# Patient Record
Sex: FEMALE | Race: ASIAN | NOT HISPANIC OR LATINO | Employment: OTHER | URBAN - METROPOLITAN AREA
[De-identification: names, ages, dates, MRNs, and addresses within clinical notes are randomized per-mention and may not be internally consistent; named-entity substitution may affect disease eponyms.]

---

## 2017-02-08 ENCOUNTER — OFFICE VISIT (OUTPATIENT)
Dept: MEDICAL GROUP | Facility: CLINIC | Age: 82
End: 2017-02-08
Payer: MEDICARE

## 2017-02-08 VITALS
SYSTOLIC BLOOD PRESSURE: 155 MMHG | BODY MASS INDEX: 32.51 KG/M2 | WEIGHT: 165.6 LBS | HEART RATE: 72 BPM | DIASTOLIC BLOOD PRESSURE: 80 MMHG | TEMPERATURE: 99.1 F | OXYGEN SATURATION: 97 % | HEIGHT: 60 IN

## 2017-02-08 DIAGNOSIS — Z23 NEED FOR INFLUENZA VACCINATION: ICD-10-CM

## 2017-02-08 DIAGNOSIS — Z12.39 SCREENING FOR BREAST CANCER: ICD-10-CM

## 2017-02-08 DIAGNOSIS — K21.00 GASTROESOPHAGEAL REFLUX DISEASE WITH ESOPHAGITIS: ICD-10-CM

## 2017-02-08 DIAGNOSIS — R51.9 HEADACHE, UNSPECIFIED HEADACHE TYPE: ICD-10-CM

## 2017-02-08 DIAGNOSIS — Z23 NEED FOR VACCINATION WITH 13-POLYVALENT PNEUMOCOCCAL CONJUGATE VACCINE: ICD-10-CM

## 2017-02-08 DIAGNOSIS — Z12.11 SCREENING FOR COLON CANCER: ICD-10-CM

## 2017-02-08 DIAGNOSIS — R09.89 RUNNY NOSE: ICD-10-CM

## 2017-02-08 DIAGNOSIS — E66.9 OBESITY (BMI 30-39.9): ICD-10-CM

## 2017-02-08 PROCEDURE — 1101F PT FALLS ASSESS-DOCD LE1/YR: CPT | Performed by: INTERNAL MEDICINE

## 2017-02-08 PROCEDURE — G0008 ADMIN INFLUENZA VIRUS VAC: HCPCS | Performed by: INTERNAL MEDICINE

## 2017-02-08 PROCEDURE — 90670 PCV13 VACCINE IM: CPT | Performed by: INTERNAL MEDICINE

## 2017-02-08 PROCEDURE — 99214 OFFICE O/P EST MOD 30 MIN: CPT | Mod: 25 | Performed by: INTERNAL MEDICINE

## 2017-02-08 PROCEDURE — G8482 FLU IMMUNIZE ORDER/ADMIN: HCPCS | Performed by: INTERNAL MEDICINE

## 2017-02-08 PROCEDURE — 1036F TOBACCO NON-USER: CPT | Performed by: INTERNAL MEDICINE

## 2017-02-08 PROCEDURE — 4040F PNEUMOC VAC/ADMIN/RCVD: CPT | Performed by: INTERNAL MEDICINE

## 2017-02-08 PROCEDURE — 90662 IIV NO PRSV INCREASED AG IM: CPT | Performed by: INTERNAL MEDICINE

## 2017-02-08 PROCEDURE — G0009 ADMIN PNEUMOCOCCAL VACCINE: HCPCS | Performed by: INTERNAL MEDICINE

## 2017-02-08 PROCEDURE — G8419 CALC BMI OUT NRM PARAM NOF/U: HCPCS | Performed by: INTERNAL MEDICINE

## 2017-02-08 PROCEDURE — G8432 DEP SCR NOT DOC, RNG: HCPCS | Performed by: INTERNAL MEDICINE

## 2017-02-08 RX ORDER — AZELASTINE 1 MG/ML
1 SPRAY, METERED NASAL 2 TIMES DAILY
Qty: 30 ML | Refills: 1 | Status: SHIPPED | OUTPATIENT
Start: 2017-02-08 | End: 2018-06-05

## 2017-02-08 RX ORDER — OMEPRAZOLE 20 MG/1
20 CAPSULE, DELAYED RELEASE ORAL DAILY
Qty: 30 CAP | Refills: 6 | Status: SHIPPED | OUTPATIENT
Start: 2017-02-08 | End: 2017-09-28 | Stop reason: SDUPTHER

## 2017-02-08 ASSESSMENT — PATIENT HEALTH QUESTIONNAIRE - PHQ9: CLINICAL INTERPRETATION OF PHQ2 SCORE: 2

## 2017-02-09 NOTE — PROGRESS NOTES
CC: Roberto Elias is a 84 y.o. female is suffering from   Chief Complaint   Patient presents with   • Sinus Problem     head pressure, x 2 months         SUBJECTIVE:  1. Headache, unspecified headache type  Patient's here for follow-up, and suffering from headache, pressure, in talking with her son it sounds as though this may be related to her glasses. We've discussed the importance of having her eyes rechecked.     2. Obesity (BMI 30-39.9)  Patient with ongoing obesity discussed importance for diet    3. Screening for breast cancer  Orders written    4. Screening for colon cancer  Orders written    5. Gastroesophageal reflux disease with esophagitis  Treated with omeprazole    6. Runny nose  Suspect seasonal allergies versus rhinitis of the aged    7. Need for influenza vaccination  Vaccine given    8. Need for vaccination with 13-polyvalent pneumococcal conjugate vaccine  Vaccine given        Past social, family, history:    Social History   Substance Use Topics   • Smoking status: Never Smoker    • Smokeless tobacco: Never Used   • Alcohol Use: No         MEDICATIONS:    Current outpatient prescriptions:   •  omeprazole (PRILOSEC) 20 MG delayed-release capsule, Take 1 Cap by mouth every day., Disp: 30 Cap, Rfl: 6  •  azelastine (ASTELIN) 137 MCG/SPRAY nasal spray, Spray 1 Spray in nose 2 times a day., Disp: 30 mL, Rfl: 1  •  amlodipine (NORVASC) 5 MG Tab, Take 1 Tab by mouth every day., Disp: 90 Tab, Rfl: 3  •  lisinopril (PRINIVIL, ZESTRIL) 40 MG tablet, TAKE 1 TABLET BY MOUTH EVERY DAY, Disp: 90 Tab, Rfl: 3  •  metoprolol SR (TOPROL XL) 25 MG TABLET SR 24 HR, TAKE 1 TABLET BY MOUTH EVERY DAY, Disp: 30 Tab, Rfl: 11  •  lovastatin (MEVACOR) 20 MG Tab, Take 1 Tab by mouth every day., Disp: 90 Tab, Rfl: 3  •  Misc. Devices Misc, One cane, use as directed due to osteoarthritis of the knees, Disp: 1 Each, Rfl: 0  •  METAMUCIL PO, Take  by mouth., Disp: , Rfl:   •  M-VIT PO, Take  by mouth every  day., Disp: , Rfl:   •  Misc. Devices MISC, Cane use as needed., Disp: 1 Each, Rfl: 0    PROBLEMS:  Patient Active Problem List    Diagnosis Date Noted   • Obesity (BMI 30-39.9) 02/08/2017   • Cataracts, bilateral 07/18/2014   • GERD (gastroesophageal reflux disease) 07/18/2014   • Dyslipidemia 07/18/2014   • HTN (hypertension) 07/18/2014   • Screen for colon cancer 07/18/2014       REVIEW OF SYSTEMS:  Gen.:  No Nausea, Vomiting, fever, Chills.  Heart: No chest pain.  Lungs:  No shortness of Breath.  Psychological: Liam unusual Anxiety depression     PHYSICAL EXAM   Constitutional: Alert, cooperative, not in acute distress.  Cardiovascular:  Rate Rhythm is regular without murmurs rubs clicks.     Thorax & Lungs: Clear to auscultation, no wheezing, rhonchi, or rales  HENT: Normocephalic, Atraumatic.  Eyes: PERRLA, EOMI, Conjunctiva normal. Funduscopic examination was done without good results  Neck: Trachia is midline no swelling of the thyroid.   Neurologic: Alert & oriented x 3, cranial nerves II through XII are intact, Normal motor function, Normal sensory function, No focal deficits noted.   Psychiatric: Affect normal, Judgment normal, Mood normal.     VITAL SIGNS:/80 mmHg  Pulse 72  Temp(Src) 37.3 °C (99.1 °F)  Ht 1.524 m (5')  Wt 75.116 kg (165 lb 9.6 oz)  BMI 32.34 kg/m2  SpO2 97%    Labs: Reviewed    Assessment:                                                     Plan:    1. Headache, unspecified headache type  Suspect related to visual changes recommend patient be seen by optometry    2. Obesity (BMI 30-39.9)  Discussed diet exercise labs ordered  - COMP METABOLIC PANEL; Future  - CBC WITH DIFFERENTIAL; Future  - LIPID PROFILE; Future  - VITAMIN D,25 HYDROXY; Future    3. Screening for breast cancer  Mammogram ordered  - MA-SCREEN MAMMO W/CAD-BILAT    4. Screening for colon cancer  Referral written for colonoscopy  - REFERRAL TO GI FOR COLONOSCOPY    5. Gastroesophageal reflux disease with  esophagitis  Continue with omeprazole    6. Runny nose  Start aspirin one  - azelastine (ASTELIN) 137 MCG/SPRAY nasal spray; Spray 1 Spray in nose 2 times a day.  Dispense: 30 mL; Refill: 1    7. Need for influenza vaccination  Vaccine given  - INFLUENZA VACCINE, HIGH DOSE (65+ ONLY)    8. Need for vaccination with 13-polyvalent pneumococcal conjugate vaccine  Vaccine given  - Prevnar 13 PCV-13

## 2017-03-17 RX ORDER — LOVASTATIN 20 MG/1
TABLET ORAL
Qty: 90 TAB | Refills: 3 | Status: SHIPPED | OUTPATIENT
Start: 2017-03-17 | End: 2018-03-31 | Stop reason: SDUPTHER

## 2017-03-21 ENCOUNTER — PATIENT OUTREACH (OUTPATIENT)
Dept: HEALTH INFORMATION MANAGEMENT | Facility: OTHER | Age: 82
End: 2017-03-21

## 2017-03-21 DIAGNOSIS — I10 ESSENTIAL HYPERTENSION: ICD-10-CM

## 2017-03-21 DIAGNOSIS — I49.3 PVC (PREMATURE VENTRICULAR CONTRACTION): ICD-10-CM

## 2017-03-21 RX ORDER — LISINOPRIL 40 MG/1
40 TABLET ORAL
Qty: 90 TAB | Refills: 3 | Status: SHIPPED | OUTPATIENT
Start: 2017-03-21 | End: 2018-06-05 | Stop reason: SDUPTHER

## 2017-03-21 RX ORDER — METOPROLOL SUCCINATE 25 MG/1
25 TABLET, EXTENDED RELEASE ORAL
Qty: 90 TAB | Refills: 3 | Status: SHIPPED | OUTPATIENT
Start: 2017-03-21 | End: 2018-06-02 | Stop reason: SDUPTHER

## 2017-03-21 RX ORDER — AMLODIPINE BESYLATE 5 MG/1
5 TABLET ORAL DAILY
Qty: 90 TAB | Refills: 3 | Status: SHIPPED | OUTPATIENT
Start: 2017-03-21 | End: 2018-03-19

## 2017-03-21 NOTE — TELEPHONE ENCOUNTER
Was the patient seen in the last year in this department? Yes  leaving to [facility] today     Does patient have an active prescription for medications requested? No     Received Request Via: Pharmacy

## 2017-03-31 NOTE — PROGRESS NOTES
Attempt #:2    Verify PCP: yes    Communication Preference Obtained: no     Review Care Team: yes    Annual Wellness Visit Scheduling  1. Scheduling Status:Not Scheduled. Patient states they are not interested     PT OUT OF TOWN CALL BACK NEXT YEAR     Care Gap Scheduling (Attempt to Schedule EACH Overdue Care Gap!)     Health Maintenance Due   Topic Date Due   • Annual Wellness Visit  DAUGHTER INFORMED   • IMM DTaP/Tdap/Td Vaccine (1 - Tdap) DAUGHTER INFORMED   • PAP SMEAR  DAUGHTER INFORMED   • MAMMOGRAM  DAUGHTER INFORMED   • COLONOSCOPY  DAUGHTER INFORMED   • IMM ZOSTER VACCINE  DAUGHTER INFORMED   • BONE DENSITY  V         [employer] Activation: already active  [employer] Vandana: no  Virtual Visits: no  Opt In to Text Messages: no

## 2017-04-18 NOTE — PROGRESS NOTES
Attempt #:3    Verify PCP: no    Communication Preference Obtained: yes     Review Care Team: no    Annual Wellness Visit Scheduling  1. Scheduling Status:Not Scheduled. Patient states they are not interested -- Patient is currently in the [facility] and daughter is unsure of when she will be back.    Care Gap Scheduling -- Care gaps not discussed with patient's daughter during this phone call.     Health Maintenance Due   Topic Date Due   • Annual Wellness Visit  04/17/1932   • IMM DTaP/Tdap/Td Vaccine (1 - Tdap) 04/17/1951   • PAP SMEAR  04/17/1953   • MAMMOGRAM  04/17/1972   • COLONOSCOPY  04/17/1982   • IMM ZOSTER VACCINE  04/17/1992   • BONE DENSITY  04/17/1997         MyChart Activation: declined -- not discussed with patient's daughter during this call.  [employer] Vandana: no  Virtual Visits: no  Opt In to Text Messages: no

## 2017-09-28 RX ORDER — OMEPRAZOLE 20 MG/1
20 CAPSULE, DELAYED RELEASE ORAL DAILY
Qty: 30 CAP | Refills: 11 | Status: SHIPPED | OUTPATIENT
Start: 2017-09-28 | End: 2018-06-05

## 2018-01-30 ENCOUNTER — TELEPHONE (OUTPATIENT)
Dept: MEDICAL GROUP | Facility: CLINIC | Age: 83
End: 2018-01-30

## 2018-01-30 ENCOUNTER — OFFICE VISIT (OUTPATIENT)
Dept: MEDICAL GROUP | Facility: CLINIC | Age: 83
End: 2018-01-30
Payer: MEDICARE

## 2018-01-30 VITALS
HEART RATE: 70 BPM | RESPIRATION RATE: 14 BRPM | DIASTOLIC BLOOD PRESSURE: 68 MMHG | HEIGHT: 60 IN | TEMPERATURE: 97.9 F | WEIGHT: 165.7 LBS | OXYGEN SATURATION: 95 % | BODY MASS INDEX: 32.53 KG/M2 | SYSTOLIC BLOOD PRESSURE: 132 MMHG

## 2018-01-30 DIAGNOSIS — R53.81 DEBILITY: ICD-10-CM

## 2018-01-30 DIAGNOSIS — H91.92 HEARING LOSS OF LEFT EAR, UNSPECIFIED HEARING LOSS TYPE: ICD-10-CM

## 2018-01-30 DIAGNOSIS — H60.391 OTHER INFECTIVE CHRONIC OTITIS EXTERNA OF RIGHT EAR: ICD-10-CM

## 2018-01-30 DIAGNOSIS — S09.90XD TRAUMATIC INJURY OF HEAD, SUBSEQUENT ENCOUNTER: ICD-10-CM

## 2018-01-30 DIAGNOSIS — H69.92 DISORDER OF LEFT EUSTACHIAN TUBE: ICD-10-CM

## 2018-01-30 PROCEDURE — 99214 OFFICE O/P EST MOD 30 MIN: CPT | Performed by: INTERNAL MEDICINE

## 2018-01-30 RX ORDER — METHYLPREDNISOLONE 4 MG/1
TABLET ORAL
Qty: 21 TAB | Refills: 0 | Status: SHIPPED | OUTPATIENT
Start: 2018-01-30 | End: 2018-06-05

## 2018-01-30 RX ORDER — OXYMETAZOLINE HYDROCHLORIDE 0.05 G/100ML
2 SPRAY NASAL 2 TIMES DAILY
Qty: 1 BOTTLE | Refills: 0 | Status: SHIPPED
Start: 2018-01-30 | End: 2018-06-05

## 2018-01-30 RX ORDER — OXYMETAZOLINE HYDROCHLORIDE 0.05 G/100ML
2 SPRAY NASAL 2 TIMES DAILY
Qty: 1 BOTTLE | Refills: 3 | Status: SHIPPED | OUTPATIENT
Start: 2018-01-30 | End: 2018-01-30

## 2018-01-30 ASSESSMENT — PATIENT HEALTH QUESTIONNAIRE - PHQ9: CLINICAL INTERPRETATION OF PHQ2 SCORE: 0

## 2018-01-30 NOTE — TELEPHONE ENCOUNTER
Please call the patients, daughter, Cortisporin otic has been rewritten again and sent to the pharmacy

## 2018-01-30 NOTE — TELEPHONE ENCOUNTER
VOICEMAIL  1. Caller Name: Meli Go, patient's daughter                     Call Back Number: [phone]    2. Message: Only One medication at the pharmacy. Patient did not get the antibiotic ear drop. Please review and advise, thank you.    3. Patient approves office to leave a detailed voicemail/MyChart message: N\A

## 2018-01-31 NOTE — PROGRESS NOTES
CC: Roberto Elias is a 85 y.o. female is suffering from No chief complaint on file.        SUBJECTIVE:  1. Traumatic injury of head, subsequent encounter  Patient's here for follow-up, suffered a traumatic injury to her head, CT was done in the [facility] in August 2017. Was reviewed from the hard copy and appears to be unremarkable.     2. Hearing loss of left ear, unspecified hearing loss type  Patient states she lost hearing in her left ear after suffering a fall. Etiology behind this is uncertain, will treat as eustachian tube dysfunction    3. Disorder of left eustachian tube  Possible eustachian tube dysfunction left ear will treat with Mucinex and methylprednisolone and Afrin short-term    4. Other infective chronic otitis externa of right ear  Patient with infection right ear treated with Cortisporin otic    5. Debility  Patient's stability is is in question patient needs a commode        Past social, family, history: Accompanied by her daughter  Social History   Substance Use Topics   • Smoking status: Never Smoker   • Smokeless tobacco: Never Used   • Alcohol use No         MEDICATIONS:    Current Outpatient Prescriptions:   •  GuaiFENesin (MUCINEX PO), Take  by mouth., Disp: , Rfl:   •  MethylPREDNISolone (MEDROL DOSEPAK) 4 MG Tablet Therapy Pack, As directed on the packaging label., Disp: 21 Tab, Rfl: 0  •  oxymetazoline (AFRIN 12 HOUR) 0.05 % Solution, Spray 2 Sprays in nose 2 times a day. Use only for 3 days., Disp: 1 Bottle, Rfl: 0  •  Misc. Devices Misc, Bed side commode,use as needed., Disp: 1 Each, Rfl: 0  •  neomycin sulf/polymyx B sulf/HC soln (CORTISPORIN HC SOL) 3.5-48479-5 Solution, Place 3 Drops in right ear 4 times a day. Administer drops to both ears., Disp: 1 Bottle, Rfl: 0  •  omeprazole (PRILOSEC) 20 MG delayed-release capsule, TAKE 1 CAP BY MOUTH EVERY DAY., Disp: 30 Cap, Rfl: 11  •  amlodipine (NORVASC) 5 MG Tab, Take 1 Tab by mouth every day., Disp: 90 Tab, Rfl: 3  •   metoprolol SR (TOPROL XL) 25 MG TABLET SR 24 HR, Take 1 Tab by mouth every day., Disp: 90 Tab, Rfl: 3  •  lisinopril (PRINIVIL, ZESTRIL) 40 MG tablet, Take 1 Tab by mouth every day., Disp: 90 Tab, Rfl: 3  •  lovastatin (MEVACOR) 20 MG Tab, TAKE 1 TABLET BY MOUTH DAILY, Disp: 90 Tab, Rfl: 3  •  M-VIT PO, Take  by mouth every day., Disp: , Rfl:   •  azelastine (ASTELIN) 137 MCG/SPRAY nasal spray, Spray 1 Spray in nose 2 times a day., Disp: 30 mL, Rfl: 1  •  Misc. Devices Misc, One cane, use as directed due to osteoarthritis of the knees, Disp: 1 Each, Rfl: 0  •  Misc. Devices MISC, Cane use as needed., Disp: 1 Each, Rfl: 0  •  METAMUCIL PO, Take  by mouth., Disp: , Rfl:     PROBLEMS:  Patient Active Problem List    Diagnosis Date Noted   • Obesity (BMI 30-39.9) 02/08/2017   • Cataracts, bilateral 07/18/2014   • GERD (gastroesophageal reflux disease) 07/18/2014   • Dyslipidemia 07/18/2014   • HTN (hypertension) 07/18/2014   • Screen for colon cancer 07/18/2014       REVIEW OF SYSTEMS:  Gen.:  No Nausea, Vomiting, fever, Chills.  Heart: No chest pain.  Lungs:  No shortness of Breath.  Psychological: Liam unusual Anxiety depression     PHYSICAL EXAM   Constitutional: Alert, cooperative, not in acute distress.  Cardiovascular:  Rate Rhythm is regular without murmurs rubs clicks.     Thorax & Lungs: Clear to auscultation, no wheezing, rhonchi, or rales  HENT: Normocephalic, Atraumatic. Left ear question eustachian tube dysfunction, right ear, likely otitis externa  Eyes: PERRLA, EOMI, Conjunctiva normal.   Neck: Trachia is midline no swelling of the thyroid.   Neurologic: Alert & oriented x 3, cranial nerves II through XII are intact, Normal motor function, Normal sensory function, No focal deficits noted.   Psychiatric: Affect normal, Judgment normal, Mood normal.     VITAL SIGNS:/68   Pulse 70   Temp 36.6 °C (97.9 °F)   Resp 14   Ht 1.524 m (5')   Wt 75.2 kg (165 lb 11.2 oz)   SpO2 95%   Breastfeeding? No    BMI 32.36 kg/m²     Labs: Reviewed    Assessment:                                                     Plan:    1. Traumatic injury of head, subsequent encounter  Traumatic injury of her head from August 2017 in the [facility] CT the head hard copy was reviewed    2. Hearing loss of left ear, unspecified hearing loss type  Start Afrin  - oxymetazoline (AFRIN 12 HOUR) 0.05 % Solution; Spray 2 Sprays in nose 2 times a day. Use only for 3 days.  Dispense: 1 Bottle; Refill: 0    3. Disorder of left eustachian tube  Treated with methylprednisolone and Afrin referral to ENT if these are not effective  - MethylPREDNISolone (MEDROL DOSEPAK) 4 MG Tablet Therapy Pack; As directed on the packaging label.  Dispense: 21 Tab; Refill: 0  - REFERRAL TO ENT  - oxymetazoline (AFRIN 12 HOUR) 0.05 % Solution; Spray 2 Sprays in nose 2 times a day. Use only for 3 days.  Dispense: 1 Bottle; Refill: 0    4. Other infective chronic otitis externa of right ear  Treated with Cortisporin otic  - neomycin sulf/polymyx B sulf/HC soln (CORTISPORIN HC SOL) 3.5-91178-6 Solution; Place 3 Drops in right ear 4 times a day. Administer drops to both ears.  Dispense: 1 Bottle; Refill: 0    5. Debility  Bedside commode due to debility  - Misc. Devices Misc; Bed side commode,use as needed.  Dispense: 1 Each; Refill: 0

## 2018-03-08 ENCOUNTER — TELEPHONE (OUTPATIENT)
Dept: MEDICAL GROUP | Facility: CLINIC | Age: 83
End: 2018-03-08

## 2018-03-08 DIAGNOSIS — H91.93 BILATERAL HEARING LOSS, UNSPECIFIED HEARING LOSS TYPE: ICD-10-CM

## 2018-03-09 NOTE — TELEPHONE ENCOUNTER
VOICEMAIL  1. Caller Name: Ashli, Patient's Daughter                      Call Back Number: [phone] (home)     2. Message: called and left a voice message requesting a referral to Dr. Crescencio Jarrett for Audiology. Stated that patient needs a hearing aide.    3. Patient approves office to leave a detailed voicemail/MyChart message: N\A

## 2018-03-19 DIAGNOSIS — I10 ESSENTIAL HYPERTENSION: ICD-10-CM

## 2018-03-19 RX ORDER — AMLODIPINE BESYLATE 5 MG/1
5 TABLET ORAL DAILY
Qty: 90 TAB | Refills: 3 | Status: SHIPPED | OUTPATIENT
Start: 2018-03-19 | End: 2019-03-31 | Stop reason: SDUPTHER

## 2018-04-02 RX ORDER — LOVASTATIN 20 MG/1
TABLET ORAL
Qty: 90 TAB | Refills: 3 | Status: SHIPPED | OUTPATIENT
Start: 2018-04-02 | End: 2019-03-31 | Stop reason: SDUPTHER

## 2018-04-06 ENCOUNTER — OFFICE VISIT (OUTPATIENT)
Dept: MEDICAL GROUP | Facility: CLINIC | Age: 83
End: 2018-04-06
Payer: MEDICARE

## 2018-04-06 VITALS
HEART RATE: 60 BPM | SYSTOLIC BLOOD PRESSURE: 155 MMHG | BODY MASS INDEX: 30.04 KG/M2 | TEMPERATURE: 98.4 F | RESPIRATION RATE: 16 BRPM | WEIGHT: 153 LBS | OXYGEN SATURATION: 95 % | HEIGHT: 60 IN | DIASTOLIC BLOOD PRESSURE: 78 MMHG

## 2018-04-06 DIAGNOSIS — N63.20 LEFT BREAST LUMP: ICD-10-CM

## 2018-04-06 PROCEDURE — 99213 OFFICE O/P EST LOW 20 MIN: CPT | Performed by: INTERNAL MEDICINE

## 2018-04-06 NOTE — PROGRESS NOTES
"CC: Roberto Elias is a 85 y.o. female is suffering from   Chief Complaint   Patient presents with   • Breast Problem     \"bloody discharge for 2 days\"         SUBJECTIVE:  1. Left breast lump  Patient's here for follow-up after experiencing bloody discharge from left breast. Patient states through her daughter who is translating that she has not had any recent discharge only 2 days prior.        Past social, family, history:   Social History   Substance Use Topics   • Smoking status: Never Smoker   • Smokeless tobacco: Never Used   • Alcohol use No         MEDICATIONS:    Current Outpatient Prescriptions:   •  lovastatin (MEVACOR) 20 MG Tab, TAKE 1 TABLET BY MOUTH DAILY, Disp: 90 Tab, Rfl: 3  •  amLODIPine (NORVASC) 5 MG Tab, TAKE 1 TAB BY MOUTH EVERY DAY., Disp: 90 Tab, Rfl: 3  •  omeprazole (PRILOSEC) 20 MG delayed-release capsule, TAKE 1 CAP BY MOUTH EVERY DAY., Disp: 30 Cap, Rfl: 11  •  metoprolol SR (TOPROL XL) 25 MG TABLET SR 24 HR, Take 1 Tab by mouth every day., Disp: 90 Tab, Rfl: 3  •  lisinopril (PRINIVIL, ZESTRIL) 40 MG tablet, Take 1 Tab by mouth every day., Disp: 90 Tab, Rfl: 3  •  METAMUCIL PO, Take  by mouth., Disp: , Rfl:   •  M-VIT PO, Take  by mouth every day., Disp: , Rfl:   •  GuaiFENesin (MUCINEX PO), Take  by mouth., Disp: , Rfl:   •  MethylPREDNISolone (MEDROL DOSEPAK) 4 MG Tablet Therapy Pack, As directed on the packaging label., Disp: 21 Tab, Rfl: 0  •  oxymetazoline (AFRIN 12 HOUR) 0.05 % Solution, Spray 2 Sprays in nose 2 times a day. Use only for 3 days., Disp: 1 Bottle, Rfl: 0  •  Misc. Devices Misc, Bed side commode,use as needed., Disp: 1 Each, Rfl: 0  •  neomycin sulf/polymyx B sulf/HC soln (CORTISPORIN HC SOL) 3.5-51584-5 Solution, Place 3 Drops in right ear 4 times a day. Administer drops to both ears., Disp: 1 Bottle, Rfl: 0  •  azelastine (ASTELIN) 137 MCG/SPRAY nasal spray, Spray 1 Spray in nose 2 times a day., Disp: 30 mL, Rfl: 1  •  Misc. Devices Misc, One " cane, use as directed due to osteoarthritis of the knees, Disp: 1 Each, Rfl: 0  •  Misc. Devices MISC, Cane use as needed., Disp: 1 Each, Rfl: 0    PROBLEMS:  Patient Active Problem List    Diagnosis Date Noted   • Obesity (BMI 30-39.9) 02/08/2017   • Cataracts, bilateral 07/18/2014   • GERD (gastroesophageal reflux disease) 07/18/2014   • Dyslipidemia 07/18/2014   • HTN (hypertension) 07/18/2014   • Screen for colon cancer 07/18/2014       REVIEW OF SYSTEMS:  Gen.:  No Nausea, Vomiting, fever, Chills.  Heart: No chest pain.  Lungs:  No shortness of Breath.  Psychological: Liam unusual Anxiety depression     PHYSICAL EXAM   Constitutional: Alert, cooperative, not in acute distress.  Cardiovascular:  Rate Rhythm is regular without murmurs rubs clicks.     Thorax & Lungs: Clear to auscultation, no wheezing, rhonchi, or rales  HENT: Normocephalic, Atraumatic.  Eyes: PERRLA, EOMI, Conjunctiva normal.   Musculoskeletal: Right breast previously within normal limits there is no evidence of swelling in the tail of Ramon or swelling in the axillary region. Left breast patient with dried blood with a retracted nipple patient with pain to palpation and palpable mass at approximately 9:00 position. Patient has no evidence of swelling associated with the tail of Ramon or axilla  Neurologic: Alert & oriented x 3, cranial nerves II through XII are intact, Normal motor function, Normal sensory function, No focal deficits noted.   Psychiatric: Affect normal, Judgment normal, Mood normal.     VITAL SIGNS:/78   Pulse 60   Temp 36.9 °C (98.4 °F)   Resp 16   Ht 1.524 m (5')   Wt 69.4 kg (153 lb)   SpO2 95%   Breastfeeding? No   BMI 29.88 kg/m²     Labs: Reviewed    Assessment:                                                     Plan:    1. Left breast lump  Left breast lump,  - US-SCREENING WHOLE BREAST BILATERAL (3D SCREENING); Future

## 2018-04-09 ENCOUNTER — HOSPITAL ENCOUNTER (OUTPATIENT)
Dept: RADIOLOGY | Facility: MEDICAL CENTER | Age: 83
End: 2018-04-09
Attending: INTERNAL MEDICINE
Payer: MEDICARE

## 2018-04-09 DIAGNOSIS — N63.20 LEFT BREAST LUMP: ICD-10-CM

## 2018-04-09 PROCEDURE — 76641 ULTRASOUND BREAST COMPLETE: CPT

## 2018-04-19 ENCOUNTER — HOSPITAL ENCOUNTER (OUTPATIENT)
Dept: RADIOLOGY | Facility: MEDICAL CENTER | Age: 83
End: 2018-04-19
Attending: INTERNAL MEDICINE
Payer: MEDICARE

## 2018-04-19 ENCOUNTER — HOSPITAL ENCOUNTER (OUTPATIENT)
Dept: LAB | Facility: MEDICAL CENTER | Age: 83
End: 2018-04-19
Attending: INTERNAL MEDICINE
Payer: MEDICARE

## 2018-04-19 ENCOUNTER — TELEPHONE (OUTPATIENT)
Dept: MEDICAL GROUP | Facility: CLINIC | Age: 83
End: 2018-04-19

## 2018-04-19 ENCOUNTER — OFFICE VISIT (OUTPATIENT)
Dept: MEDICAL GROUP | Facility: CLINIC | Age: 83
End: 2018-04-19
Payer: MEDICARE

## 2018-04-19 VITALS
BODY MASS INDEX: 28.34 KG/M2 | HEART RATE: 75 BPM | TEMPERATURE: 97.9 F | RESPIRATION RATE: 12 BRPM | DIASTOLIC BLOOD PRESSURE: 65 MMHG | OXYGEN SATURATION: 96 % | SYSTOLIC BLOOD PRESSURE: 142 MMHG | HEIGHT: 62 IN | WEIGHT: 154 LBS

## 2018-04-19 DIAGNOSIS — N63.20 LEFT BREAST LUMP: ICD-10-CM

## 2018-04-19 DIAGNOSIS — N63.20 LEFT BREAST MASS: ICD-10-CM

## 2018-04-19 DIAGNOSIS — N64.52 BLOODY DISCHARGE FROM RIGHT NIPPLE: ICD-10-CM

## 2018-04-19 DIAGNOSIS — I10 ESSENTIAL HYPERTENSION: ICD-10-CM

## 2018-04-19 DIAGNOSIS — N63.0 BREAST MASS: ICD-10-CM

## 2018-04-19 DIAGNOSIS — E78.5 DYSLIPIDEMIA: ICD-10-CM

## 2018-04-19 LAB
ALBUMIN SERPL BCP-MCNC: 4.1 G/DL (ref 3.2–4.9)
ALBUMIN/GLOB SERPL: 1.2 G/DL
ALP SERPL-CCNC: 78 U/L (ref 30–99)
ALT SERPL-CCNC: 11 U/L (ref 2–50)
ANION GAP SERPL CALC-SCNC: 8 MMOL/L (ref 0–11.9)
AST SERPL-CCNC: 18 U/L (ref 12–45)
BASOPHILS # BLD AUTO: 0.5 % (ref 0–1.8)
BASOPHILS # BLD: 0.04 K/UL (ref 0–0.12)
BILIRUB SERPL-MCNC: 0.4 MG/DL (ref 0.1–1.5)
BUN SERPL-MCNC: 20 MG/DL (ref 8–22)
CALCIUM SERPL-MCNC: 10.1 MG/DL (ref 8.5–10.5)
CHLORIDE SERPL-SCNC: 103 MMOL/L (ref 96–112)
CO2 SERPL-SCNC: 25 MMOL/L (ref 20–33)
CREAT SERPL-MCNC: 1.33 MG/DL (ref 0.5–1.4)
EOSINOPHIL # BLD AUTO: 0.13 K/UL (ref 0–0.51)
EOSINOPHIL NFR BLD: 1.7 % (ref 0–6.9)
ERYTHROCYTE [DISTWIDTH] IN BLOOD BY AUTOMATED COUNT: 47.4 FL (ref 35.9–50)
GLOBULIN SER CALC-MCNC: 3.3 G/DL (ref 1.9–3.5)
GLUCOSE SERPL-MCNC: 141 MG/DL (ref 65–99)
HCT VFR BLD AUTO: 41.4 % (ref 37–47)
HGB BLD-MCNC: 13.6 G/DL (ref 12–16)
IMM GRANULOCYTES # BLD AUTO: 0.02 K/UL (ref 0–0.11)
IMM GRANULOCYTES NFR BLD AUTO: 0.3 % (ref 0–0.9)
LYMPHOCYTES # BLD AUTO: 3.19 K/UL (ref 1–4.8)
LYMPHOCYTES NFR BLD: 41.9 % (ref 22–41)
MCH RBC QN AUTO: 33.4 PG (ref 27–33)
MCHC RBC AUTO-ENTMCNC: 32.9 G/DL (ref 33.6–35)
MCV RBC AUTO: 101.7 FL (ref 81.4–97.8)
MONOCYTES # BLD AUTO: 0.62 K/UL (ref 0–0.85)
MONOCYTES NFR BLD AUTO: 8.1 % (ref 0–13.4)
NEUTROPHILS # BLD AUTO: 3.62 K/UL (ref 2–7.15)
NEUTROPHILS NFR BLD: 47.5 % (ref 44–72)
NRBC # BLD AUTO: 0 K/UL
NRBC BLD-RTO: 0 /100 WBC
PLATELET # BLD AUTO: 192 K/UL (ref 164–446)
PMV BLD AUTO: 11.8 FL (ref 9–12.9)
POTASSIUM SERPL-SCNC: 4 MMOL/L (ref 3.6–5.5)
PROT SERPL-MCNC: 7.4 G/DL (ref 6–8.2)
RBC # BLD AUTO: 4.07 M/UL (ref 4.2–5.4)
SODIUM SERPL-SCNC: 136 MMOL/L (ref 135–145)
WBC # BLD AUTO: 7.6 K/UL (ref 4.8–10.8)

## 2018-04-19 PROCEDURE — 80053 COMPREHEN METABOLIC PANEL: CPT

## 2018-04-19 PROCEDURE — 76642 ULTRASOUND BREAST LIMITED: CPT | Mod: LT

## 2018-04-19 PROCEDURE — G0279 TOMOSYNTHESIS, MAMMO: HCPCS

## 2018-04-19 PROCEDURE — 99214 OFFICE O/P EST MOD 30 MIN: CPT | Performed by: INTERNAL MEDICINE

## 2018-04-19 PROCEDURE — 36415 COLL VENOUS BLD VENIPUNCTURE: CPT

## 2018-04-19 PROCEDURE — 85025 COMPLETE CBC W/AUTO DIFF WBC: CPT

## 2018-04-19 NOTE — LETTER
April 19, 2018         Nabilaracion Reyes Villagracia  [address]  [place] [address]        Dear Veneracion:      Below are the results from your recent visit:    Resulted Orders   US-BREAST LIMITED-LEFT    Narrative    4/19/2018 1:35 PM    HISTORY/REASON FOR EXAM:  Lump/Mass      TECHNIQUE/EXAM DESCRIPTION AND NUMBER OF VIEWS:  Bilateral tomosynthesis diagnostic mammography was performed with standard mammographic images generated from the data set. Images were reviewed and interpreted with CAD.    COMPARISON:   None    FINDINGS:  The breast parenchyma is heterogeneously dense which may obscure small masses.  Left breast: There is a rounded area of parenchymal asymmetry in the retroareolar portion of the left breast without spiculation or suspicious calcification.  The left nipple is chronically inverted.  Right breast: No spiculation or suspicious calcification is present. Vascular calcifications are present.  The right nipple is chronically inverted.    The patient was scheduled for a left breast galactogram due to the bloody discharge.  The left nipple is inverted and the site of bloody discharge cannot be visualized for introduction of a galactogram catheter.  Ultrasound examination of the retroareolar portion of the left breast was performed.    Limited left breast ultrasound: There is a rounded hypoechoic solid mass in the left breast at the 3:00 position 1 cm from the nipple which measures 1.5 x 9.4 x 9.1 mm. There is a dilated duct extending from this mass towards the nipple and there is a   rounded slightly hypoechoic masslike area within the duct near the 1st mass measuring 9.2 x 6.1 x 4.2 mm.  This mass and adjacent intraductal mass may represent benign processes such as papilloma versus carcinoma.  Surgical consultation is recommended for excision.  Ultrasound-guided biopsy of the 1st mass and the intraductal mass could also be performed if necessary.    The above findings were discussed with  the patient and her daughter.        Impression    1.  Rounded hypoechoic solid mass in the left breast at the 3:00 position 1 cm from the nipple measuring 1.5 cm in maximum dimension with an adjacent dilated duct with 2nd intraductal mass in the duct near the 1st mass measuring 9.2 mm in diameter.    2.  Surgical consultation is recommended for excision of the 2 masses and the dilated duct.    3.  Alternatively ultrasound biopsy could be performed.    These results were given to the patient at the time of visit.    R4   Category 4:  Suspicious Abnormality - Biopsy Should Be Considered    Findings were discussed with TAMMY AGRAWAL M.D. on 4/19/2018 2:32 PM.     Sincerely,      Tammy Agrawal D.O.    Electronically Signed

## 2018-04-19 NOTE — LETTER
April 19, 2018         Nabilaracion Reyes Villagracia  [address]  [place] [address]        Dear Veneracion:      Below are the results from your recent visit:    Resulted Orders   US-BREAST LIMITED-LEFT    Narrative    4/19/2018 1:35 PM    HISTORY/REASON FOR EXAM:  Lump/Mass      TECHNIQUE/EXAM DESCRIPTION AND NUMBER OF VIEWS:  Bilateral tomosynthesis diagnostic mammography was performed with standard mammographic images generated from the data set. Images were reviewed and interpreted with CAD.    COMPARISON:   None    FINDINGS:  The breast parenchyma is heterogeneously dense which may obscure small masses.  Left breast: There is a rounded area of parenchymal asymmetry in the retroareolar portion of the left breast without spiculation or suspicious calcification.  The left nipple is chronically inverted.  Right breast: No spiculation or suspicious calcification is present. Vascular calcifications are present.  The right nipple is chronically inverted.    The patient was scheduled for a left breast galactogram due to the bloody discharge.  The left nipple is inverted and the site of bloody discharge cannot be visualized for introduction of a galactogram catheter.  Ultrasound examination of the retroareolar portion of the left breast was performed.    Limited left breast ultrasound: There is a rounded hypoechoic solid mass in the left breast at the 3:00 position 1 cm from the nipple which measures 1.5 x 9.4 x 9.1 mm. There is a dilated duct extending from this mass towards the nipple and there is a   rounded slightly hypoechoic masslike area within the duct near the 1st mass measuring 9.2 x 6.1 x 4.2 mm.  This mass and adjacent intraductal mass may represent benign processes such as papilloma versus carcinoma.  Surgical consultation is recommended for excision.  Ultrasound-guided biopsy of the 1st mass and the intraductal mass could also be performed if necessary.    The above findings were discussed with  the patient and her daughter.        Impression    1.  Rounded hypoechoic solid mass in the left breast at the 3:00 position 1 cm from the nipple measuring 1.5 cm in maximum dimension with an adjacent dilated duct with 2nd intraductal mass in the duct near the 1st mass measuring 9.2 mm in diameter.    2.  Surgical consultation is recommended for excision of the 2 masses and the dilated duct.    3.  Alternatively ultrasound biopsy could be performed.    These results were given to the patient at the time of visit.    R4   Category 4:  Suspicious Abnormality - Biopsy Should Be Considered    Findings were discussed with TAMMY AGRAWAL M.D. on 4/19/2018 2:32 PM.     The test results show that ***.    If you have any questions or concerns, please don't hesitate to call.        Sincerely,      Tammy Agrawal D.O.    Electronically Signed

## 2018-04-20 NOTE — PROGRESS NOTES
CC: Veneracion Reyes Villagracia is a 86 y.o. female is suffering from   Chief Complaint   Patient presents with   • Results     discuss mammogram results         SUBJECTIVE:  1. Dyslipidemia  Patient is accompanied today by her daughter, discussed her cholesterol patient's unit to continue on Mevacor    2. Essential hypertension  Patient with essential hypertension clinically stable    3. Left breast mass  Patient left breast mass case was reviewed and discussed earlier today with Dr. Jose Montana. Were in agreement that the patient would benefit from being evaluated by general surgery referral has been written        Past social, family, history:   Social History   Substance Use Topics   • Smoking status: Never Smoker   • Smokeless tobacco: Never Used   • Alcohol use No         MEDICATIONS:    Current Outpatient Prescriptions:   •  lovastatin (MEVACOR) 20 MG Tab, TAKE 1 TABLET BY MOUTH DAILY, Disp: 90 Tab, Rfl: 3  •  amLODIPine (NORVASC) 5 MG Tab, TAKE 1 TAB BY MOUTH EVERY DAY., Disp: 90 Tab, Rfl: 3  •  omeprazole (PRILOSEC) 20 MG delayed-release capsule, TAKE 1 CAP BY MOUTH EVERY DAY., Disp: 30 Cap, Rfl: 11  •  metoprolol SR (TOPROL XL) 25 MG TABLET SR 24 HR, Take 1 Tab by mouth every day., Disp: 90 Tab, Rfl: 3  •  lisinopril (PRINIVIL, ZESTRIL) 40 MG tablet, Take 1 Tab by mouth every day., Disp: 90 Tab, Rfl: 3  •  GuaiFENesin (MUCINEX PO), Take  by mouth., Disp: , Rfl:   •  MethylPREDNISolone (MEDROL DOSEPAK) 4 MG Tablet Therapy Pack, As directed on the packaging label., Disp: 21 Tab, Rfl: 0  •  oxymetazoline (AFRIN 12 HOUR) 0.05 % Solution, Spray 2 Sprays in nose 2 times a day. Use only for 3 days., Disp: 1 Bottle, Rfl: 0  •  Misc. Devices Misc, Bed side commode,use as needed., Disp: 1 Each, Rfl: 0  •  neomycin sulf/polymyx B sulf/HC soln (CORTISPORIN HC SOL) 3.5-22144-4 Solution, Place 3 Drops in right ear 4 times a day. Administer drops to both ears., Disp: 1 Bottle, Rfl: 0  •  azelastine (ASTELIN)  "137 MCG/SPRAY nasal spray, Spray 1 Spray in nose 2 times a day., Disp: 30 mL, Rfl: 1  •  Misc. Devices Misc, One cane, use as directed due to osteoarthritis of the knees, Disp: 1 Each, Rfl: 0  •  Misc. Devices MISC, Cane use as needed., Disp: 1 Each, Rfl: 0  •  METAMUCIL PO, Take  by mouth., Disp: , Rfl:   •  M-VIT PO, Take  by mouth every day., Disp: , Rfl:     PROBLEMS:  Patient Active Problem List    Diagnosis Date Noted   • Obesity (BMI 30-39.9) 02/08/2017   • Cataracts, bilateral 07/18/2014   • GERD (gastroesophageal reflux disease) 07/18/2014   • Dyslipidemia 07/18/2014   • HTN (hypertension) 07/18/2014   • Screen for colon cancer 07/18/2014       REVIEW OF SYSTEMS:  Gen.:  No Nausea, Vomiting, fever, Chills.  Heart: No chest pain.  Lungs:  No shortness of Breath.  Psychological: Laim unusual Anxiety depression     PHYSICAL EXAM   Constitutional: Alert, cooperative, not in acute distress.  Cardiovascular:  Rate Rhythm is regular without murmurs rubs clicks.     Thorax & Lungs: Clear to auscultation, no wheezing, rhonchi, or rales  HENT: Normocephalic, Atraumatic.  Eyes: PERRLA, EOMI, Conjunctiva normal.   Neck: Trachia is midline no swelling of the thyroid.   Lymphatic: No lymphadenopathy noted.   Neurologic: Alert & oriented x 3, cranial nerves II through XII are intact, Normal motor function, Normal sensory function, No focal deficits noted.   Psychiatric: Affect normal, Judgment normal, Mood normal.     VITAL SIGNS:/65   Pulse 75   Temp 36.6 °C (97.9 °F)   Resp 12   Ht 1.575 m (5' 2\")   Wt 69.9 kg (154 lb)   SpO2 96%   BMI 28.17 kg/m²     Labs: Reviewed    Assessment:                                                     Plan:    1. Dyslipidemia  Clinically stable    2. Essential hypertension  Labs ordered  - COMP METABOLIC PANEL; Future  - CBC WITH DIFFERENTIAL; Future    3. Left breast mass  Referral written to general surgery Dr. Sheba Howell.         "

## 2018-06-02 DIAGNOSIS — I49.3 PVC (PREMATURE VENTRICULAR CONTRACTION): ICD-10-CM

## 2018-06-02 RX ORDER — METOPROLOL SUCCINATE 25 MG/1
25 TABLET, EXTENDED RELEASE ORAL
Qty: 90 TAB | Refills: 2 | Status: SHIPPED | OUTPATIENT
Start: 2018-06-02 | End: 2018-12-03 | Stop reason: SDUPTHER

## 2018-06-05 DIAGNOSIS — Z01.812 PRE-OPERATIVE LABORATORY EXAMINATION: ICD-10-CM

## 2018-06-05 DIAGNOSIS — Z01.810 PRE-OPERATIVE CARDIOVASCULAR EXAMINATION: ICD-10-CM

## 2018-06-05 LAB
ANION GAP SERPL CALC-SCNC: 9 MMOL/L (ref 0–11.9)
BUN SERPL-MCNC: 23 MG/DL (ref 8–22)
CALCIUM SERPL-MCNC: 10.3 MG/DL (ref 8.5–10.5)
CHLORIDE SERPL-SCNC: 100 MMOL/L (ref 96–112)
CO2 SERPL-SCNC: 24 MMOL/L (ref 20–33)
CREAT SERPL-MCNC: 0.78 MG/DL (ref 0.5–1.4)
EKG IMPRESSION: NORMAL
ERYTHROCYTE [DISTWIDTH] IN BLOOD BY AUTOMATED COUNT: 47.8 FL (ref 35.9–50)
GLUCOSE SERPL-MCNC: 97 MG/DL (ref 65–99)
HCT VFR BLD AUTO: 39.4 % (ref 37–47)
HGB BLD-MCNC: 13.2 G/DL (ref 12–16)
MCH RBC QN AUTO: 33.6 PG (ref 27–33)
MCHC RBC AUTO-ENTMCNC: 33.5 G/DL (ref 33.6–35)
MCV RBC AUTO: 100.3 FL (ref 81.4–97.8)
PLATELET # BLD AUTO: 190 K/UL (ref 164–446)
PMV BLD AUTO: 10.7 FL (ref 9–12.9)
POTASSIUM SERPL-SCNC: 4.5 MMOL/L (ref 3.6–5.5)
RBC # BLD AUTO: 3.93 M/UL (ref 4.2–5.4)
SODIUM SERPL-SCNC: 133 MMOL/L (ref 135–145)
WBC # BLD AUTO: 8.8 K/UL (ref 4.8–10.8)

## 2018-06-05 PROCEDURE — 80048 BASIC METABOLIC PNL TOTAL CA: CPT

## 2018-06-05 PROCEDURE — 85027 COMPLETE CBC AUTOMATED: CPT

## 2018-06-05 PROCEDURE — 93005 ELECTROCARDIOGRAM TRACING: CPT

## 2018-06-05 PROCEDURE — 93010 ELECTROCARDIOGRAM REPORT: CPT | Performed by: INTERNAL MEDICINE

## 2018-06-05 PROCEDURE — 36415 COLL VENOUS BLD VENIPUNCTURE: CPT

## 2018-06-05 RX ORDER — LISINOPRIL 40 MG/1
40 TABLET ORAL
Qty: 90 TAB | Refills: 3 | Status: SHIPPED | OUTPATIENT
Start: 2018-06-05 | End: 2019-04-18 | Stop reason: SDUPTHER

## 2018-06-14 ENCOUNTER — HOSPITAL ENCOUNTER (OUTPATIENT)
Facility: MEDICAL CENTER | Age: 83
End: 2018-06-14
Attending: SURGERY | Admitting: SURGERY
Payer: MEDICARE

## 2018-06-14 VITALS
BODY MASS INDEX: 28.07 KG/M2 | WEIGHT: 152.56 LBS | HEIGHT: 62 IN | RESPIRATION RATE: 16 BRPM | SYSTOLIC BLOOD PRESSURE: 151 MMHG | OXYGEN SATURATION: 94 % | TEMPERATURE: 96.8 F | DIASTOLIC BLOOD PRESSURE: 52 MMHG | HEART RATE: 94 BPM

## 2018-06-14 PROCEDURE — 160002 HCHG RECOVERY MINUTES (STAT): Performed by: SURGERY

## 2018-06-14 PROCEDURE — 160009 HCHG ANES TIME/MIN: Performed by: SURGERY

## 2018-06-14 PROCEDURE — 88307 TISSUE EXAM BY PATHOLOGIST: CPT

## 2018-06-14 PROCEDURE — 700101 HCHG RX REV CODE 250

## 2018-06-14 PROCEDURE — 501838 HCHG SUTURE GENERAL: Performed by: SURGERY

## 2018-06-14 PROCEDURE — 700111 HCHG RX REV CODE 636 W/ 250 OVERRIDE (IP)

## 2018-06-14 PROCEDURE — A6402 STERILE GAUZE <= 16 SQ IN: HCPCS | Performed by: SURGERY

## 2018-06-14 PROCEDURE — 700102 HCHG RX REV CODE 250 W/ 637 OVERRIDE(OP)

## 2018-06-14 PROCEDURE — 160048 HCHG OR STATISTICAL LEVEL 1-5: Performed by: SURGERY

## 2018-06-14 PROCEDURE — 160025 RECOVERY II MINUTES (STATS): Performed by: SURGERY

## 2018-06-14 PROCEDURE — 160041 HCHG SURGERY MINUTES - EA ADDL 1 MIN LEVEL 4: Performed by: SURGERY

## 2018-06-14 PROCEDURE — 500122 HCHG BOVIE, BLADE: Performed by: SURGERY

## 2018-06-14 PROCEDURE — A9270 NON-COVERED ITEM OR SERVICE: HCPCS

## 2018-06-14 PROCEDURE — 160029 HCHG SURGERY MINUTES - 1ST 30 MINS LEVEL 4: Performed by: SURGERY

## 2018-06-14 PROCEDURE — 160047 HCHG PACU  - EA ADDL 30 MINS PHASE II: Performed by: SURGERY

## 2018-06-14 PROCEDURE — 160046 HCHG PACU - 1ST 60 MINS PHASE II: Performed by: SURGERY

## 2018-06-14 PROCEDURE — 160035 HCHG PACU - 1ST 60 MINS PHASE I: Performed by: SURGERY

## 2018-06-14 RX ORDER — HYDROCODONE BITARTRATE AND ACETAMINOPHEN 5; 325 MG/1; MG/1
TABLET ORAL
Status: COMPLETED
Start: 2018-06-14 | End: 2018-06-14

## 2018-06-14 RX ORDER — SODIUM CHLORIDE AND POTASSIUM CHLORIDE 150; 900 MG/100ML; MG/100ML
INJECTION, SOLUTION INTRAVENOUS CONTINUOUS
Status: DISCONTINUED | OUTPATIENT
Start: 2018-06-14 | End: 2018-06-14 | Stop reason: HOSPADM

## 2018-06-14 RX ORDER — HYDROCODONE BITARTRATE AND ACETAMINOPHEN 5; 325 MG/1; MG/1
1-2 TABLET ORAL EVERY 4 HOURS PRN
Status: DISCONTINUED | OUTPATIENT
Start: 2018-06-14 | End: 2018-06-14 | Stop reason: HOSPADM

## 2018-06-14 RX ORDER — LIDOCAINE HYDROCHLORIDE 10 MG/ML
0.5 INJECTION, SOLUTION INFILTRATION; PERINEURAL
Status: DISCONTINUED | OUTPATIENT
Start: 2018-06-14 | End: 2018-06-14 | Stop reason: HOSPADM

## 2018-06-14 RX ORDER — SODIUM CHLORIDE, SODIUM LACTATE, POTASSIUM CHLORIDE, CALCIUM CHLORIDE 600; 310; 30; 20 MG/100ML; MG/100ML; MG/100ML; MG/100ML
INJECTION, SOLUTION INTRAVENOUS CONTINUOUS
Status: DISCONTINUED | OUTPATIENT
Start: 2018-06-14 | End: 2018-06-14 | Stop reason: HOSPADM

## 2018-06-14 RX ORDER — LIDOCAINE HYDROCHLORIDE 10 MG/ML
INJECTION, SOLUTION EPIDURAL; INFILTRATION; INTRACAUDAL; PERINEURAL
Status: COMPLETED
Start: 2018-06-14 | End: 2018-06-14

## 2018-06-14 RX ORDER — BUPIVACAINE HYDROCHLORIDE 2.5 MG/ML
INJECTION, SOLUTION EPIDURAL; INFILTRATION; INTRACAUDAL
Status: DISCONTINUED | OUTPATIENT
Start: 2018-06-14 | End: 2018-06-14 | Stop reason: HOSPADM

## 2018-06-14 RX ADMIN — LIDOCAINE HYDROCHLORIDE 0.5 ML: 10 INJECTION, SOLUTION EPIDURAL; INFILTRATION; INTRACAUDAL; PERINEURAL at 09:35

## 2018-06-14 RX ADMIN — SODIUM CHLORIDE, SODIUM LACTATE, POTASSIUM CHLORIDE, CALCIUM CHLORIDE: 600; 310; 30; 20 INJECTION, SOLUTION INTRAVENOUS at 09:35

## 2018-06-14 RX ADMIN — LIDOCAINE HYDROCHLORIDE 0.5 ML: 10 INJECTION, SOLUTION INFILTRATION; PERINEURAL at 09:35

## 2018-06-14 RX ADMIN — HYDROCODONE BITARTRATE AND ACETAMINOPHEN 1 TABLET: 5; 325 TABLET ORAL at 13:49

## 2018-06-14 RX ADMIN — HYDROCODONE BITARTRATE AND ACETAMINOPHEN 1 TABLET: 5; 325 TABLET ORAL at 13:25

## 2018-06-14 ASSESSMENT — PAIN SCALES - GENERAL
PAINLEVEL_OUTOF10: 0
PAINLEVEL_OUTOF10: 6
PAINLEVEL_OUTOF10: 0

## 2018-06-14 NOTE — OP REPORT
DATE OF SERVICE:  06/14/2018    PREOPERATIVE DIAGNOSIS:  Left breast mass with bloody nipple discharge.    POSTOPERATIVE DIAGNOSIS:  Left breast mass with bloody nipple discharge.    PROCEDURE:  Left retroareolar breast biopsy as well as the left breast biopsy.    SURGEON:  Kacie Lizama MD    ASSISTANT:  ADOLPH Peters    ANESTHESIA:  Laryngeal mask.    ANESTHESIOLOGIST:  Matt Christine MD    INDICATIONS:  The patient is an 86-year-old female who had a mammogram and   ultrasound, which demonstrated a 1 cm mass in her periareolar area of her left   breast.  She also has been having some bloody nipple discharge.  She is being   brought at this time for retroareolar biopsy as well as incisional biopsy of   the mass.    FINDINGS:  Bloody duct that was found in the retroareolar area was extended   down into the tissue and the retroareolar was excised and sent to pathology   for evaluation.  The mass in the periareolar area was also excised.    DESCRIPTION OF PROCEDURE:  After patient was identified and consented, she was   brought to the operating room and was placed in supine position.  Patient   underwent laryngeal mask anesthetic clearance.  Patient's left breast was   prepped and draped in sterile fashion.  A curvilinear incision was made along   the circumareolar area.  Using electrocautery, subcutaneous tissue was   dissected down, the retroperiareolar tissue was excised with electrocautery.    The mass was included and this was widely excised and sent to pathology.    There was noted to be some abnormal tissue in the inferior aspect of the   excision site section and that was also removed separately using   electrocautery.  The cavity was irrigated and hemostasis was secured.  Cavity   was closed with direct subcutaneous layer and the skin was closed with running   4-0 in subcuticular fashion.  Op-Site dressing was placed on the wound.    Patient was extubated and was taken in stable condition.  All  sponge and   needle counts were correct.       ____________________________________     MD ANU NORRIS / VLADIMIR    DD:  06/14/2018 11:51:48  DT:  06/14/2018 12:02:02    D#:  [id number]  Job#:  [id number]    cc: Matt Christine MD, TAMMY GENAO DO

## 2018-06-14 NOTE — PROGRESS NOTES
1315- pt in phase 2, reports pain increasing, medicated per MAR. Ice pack on left breast, drsg cdi  Pt daughter at bedside, vss, will continue to monitor    1340- pt pain still elevated, medicated per mar, daughter at bedside, pt tolerating po, caron continue to monitor    1415- pt reports pain tolerable, vss, pt states readu tp get dressed and go home, daughter at bedside assisting pt to change into clothes. Pt has all belongings, all needs met, safe to dc home

## 2018-06-14 NOTE — DISCHARGE INSTRUCTIONS
ACTIVITY: Rest and take it easy for the first 24 hours.  A responsible adult is recommended to remain with you during that time.  It is normal to feel sleepy.  We encourage you to not do anything that requires balance, judgment or coordination.    MILD FLU-LIKE SYMPTOMS ARE NORMAL. YOU MAY EXPERIENCE GENERALIZED MUSCLE ACHES, THROAT IRRITATION, HEADACHE AND/OR SOME NAUSEA.    FOR 24 HOURS DO NOT:  Drive, operate machinery or run household appliances.  Drink beer or alcoholic beverages.   Make important decisions or sign legal documents.    SPECIAL INSTRUCTIONS:   Discharge Instructions, Lumpectomy:    Care of Your Incisions:  • Leave the bandages on for 48 hours. You can shower with the dressing on as it is waterproof.  Avoid getting lotions, powders or deodorant on the incision while it is healing.  • There is no need to re-bandage the incisions after the first 48 hours, but it may be more comfortable to tuck a gauze pad inside your bra for the first week. You can buy 4 x 4 gauze dressings at your pharmacy.   • You may have thin paper strips across the incision. These are called Steri-Strips. When they start to curl at the edges, you can peel them off. It is okay to shower with these on.  • Don't worry if the area under either incision feels firm or hard. This is normal and usually softens within a few months.    How to Manage Discomfort After Surgery:  • It is normal to have tenderness, discomfort or mild swelling at the site of the incisions.     You may also feel:  - Numbness at the incisions.  - Occasional shooting pains in the breast as you heal.    • You will be given a prescription for pain medication prior to being discharged from the hospital. If you are having pain, take the medication as directed by your physician and by the label directions. You should be comfortable and moving around. Most women use some prescription pain medication, though some need only over the counter pain medication, such as  ibuprofen (Motrin) or acetaminophen (Tylenol). Some women have little pain and take nothing at all. Whatever amount of pain you have, it is important to listen to your body and use the medication if needed during your recovery.    • Prescription pain medication may cause constipation. If you are having problems, use what you normally would or call your nurse for suggestions. It also helps to stay regular by including fiber in your diet (for example: bran or fruits and vegetables) and drink plenty of liquids (water, juice, etc.).    Activity:  • You may resume your normal routine, but avoid heavy lifting (over 10 pounds), pushing or pulling until your first post-operative visit, unless otherwise specified by your surgeon.  • Do not drive for at least 24-48 hours after surgery (unless otherwise directed by your surgeon), or while you are taking prescription pain medicine. Prescription pain medicine causes drowsiness (makes you sleepy) so you should avoid doing any tasks where you should be awake and alert (driving, operating machinery, etc).    When to Call Your Doctor/Nurse:  • If you have a fever greater than 102, increased pain, redness or warmth at the area around the incision, drainage from the incision or around the drain, or swelling of the arm.    You should have a follow up appointment about a week after surgery, call [phone] if you do not already have an appointment.    Office addresses:  [address] [place] [address]  [address] [place] [address], [address] [place] [address]      DIET: To avoid nausea, slowly advance diet as tolerated, avoiding spicy or greasy foods for the first day.  Add more substantial food to your diet according to your physician's instructions.  Babies can be fed formula or breast milk as soon as they are hungry.  INCREASE FLUIDS AND FIBER TO AVOID CONSTIPATION.    SURGICAL DRESSING/BATHING: see above instructions.    FOLLOW-UP APPOINTMENT:  A follow-up appointment should be arranged with  your doctor in 2 weeks call to schedule.    You should CALL YOUR PHYSICIAN if you develop:  Fever greater than 101 degrees F.  Pain not relieved by medication, or persistent nausea or vomiting.  Excessive bleeding (blood soaking through dressing) or unexpected drainage from the wound.  Extreme redness or swelling around the incision site, drainage of pus or foul smelling drainage.  Inability to urinate or empty your bladder within 8 hours.  Problems with breathing or chest pain.    You should call 911 if you develop problems with breathing or chest pain.  If you are unable to contact your doctor or surgical center, you should go to the nearest emergency room or urgent care center.  Physician's telephone #: [phone].    If any questions arise, call your doctor.  If your doctor is not available, please feel free to call the Surgical Center at [phone].  The Center is open Monday through Friday from 7AM to 7PM.  You can also call the [employer] HOTLINE open 24 hours/day, 7 days/week and speak to a nurse at [phone], or toll free at [phone].    A registered nurse may call you a few days after your surgery to see how you are doing after your procedure.    MEDICATIONS: Resume taking daily medication.  Take prescribed pain medication with food.  If no medication is prescribed, you may take non-aspirin pain medication if needed.  PAIN MEDICATION CAN BE VERY CONSTIPATING.  Take a stool softener or laxative such as senokot, pericolace, or milk of magnesia if needed.    Prescription given for norco.  Can take anytime.    If your physician has prescribed pain medication that includes Acetaminophen (Tylenol), do not take additional Acetaminophen (Tylenol) while taking the prescribed medication.    Depression / Suicide Risk    As you are discharged from this [facility] facility, it is important to learn how to keep safe from harming yourself.    Recognize the warning signs:  · Abrupt changes in personality,  positive or negative- including increase in energy   · Giving away possessions  · Change in eating patterns- significant weight changes-  positive or negative  · Change in sleeping patterns- unable to sleep or sleeping all the time   · Unwillingness or inability to communicate  · Depression  · Unusual sadness, discouragement and loneliness  · Talk of wanting to die  · Neglect of personal appearance   · Rebelliousness- reckless behavior  · Withdrawal from people/activities they love  · Confusion- inability to concentrate     If you or a loved one observes any of these behaviors or has concerns about self-harm, here's what you can do:  · Talk about it- your feelings and reasons for harming yourself  · Remove any means that you might use to hurt yourself (examples: pills, rope, extension cords, firearm)  · Get professional help from the community (Mental Health, Substance Abuse, psychological counseling)  · Do not be alone:Call your Safe Contact- someone whom you trust who will be there for you.  · Call your local CRISIS HOTLINE 541-0586 or 754-549-4072  · Call your local Children's Mobile Crisis Response Team [place] (441) 993-8417 or www.K[employer]agent  · Call the toll free National Suicide Prevention Hotlines   · National Suicide Prevention Lifeline 092-185-ZLKZ (8886)  · National Hope Line Network 800-SUICIDE (920-5321)

## 2018-08-14 ENCOUNTER — OFFICE VISIT (OUTPATIENT)
Dept: MEDICAL GROUP | Facility: MEDICAL CENTER | Age: 83
End: 2018-08-14
Payer: MEDICARE

## 2018-08-14 VITALS
RESPIRATION RATE: 16 BRPM | WEIGHT: 153 LBS | BODY MASS INDEX: 30.04 KG/M2 | HEIGHT: 60 IN | SYSTOLIC BLOOD PRESSURE: 122 MMHG | OXYGEN SATURATION: 94 % | TEMPERATURE: 98.6 F | DIASTOLIC BLOOD PRESSURE: 76 MMHG | HEART RATE: 74 BPM

## 2018-08-14 DIAGNOSIS — E78.5 DYSLIPIDEMIA: ICD-10-CM

## 2018-08-14 DIAGNOSIS — Z13.820 SCREENING FOR OSTEOPOROSIS: ICD-10-CM

## 2018-08-14 DIAGNOSIS — G47.00 INSOMNIA, UNSPECIFIED TYPE: ICD-10-CM

## 2018-08-14 DIAGNOSIS — I10 ESSENTIAL HYPERTENSION: ICD-10-CM

## 2018-08-14 DIAGNOSIS — Z78.0 MENOPAUSE: ICD-10-CM

## 2018-08-14 DIAGNOSIS — M17.0 PRIMARY OSTEOARTHRITIS OF BOTH KNEES: ICD-10-CM

## 2018-08-14 DIAGNOSIS — R53.83 FATIGUE, UNSPECIFIED TYPE: ICD-10-CM

## 2018-08-14 DIAGNOSIS — E66.9 OBESITY (BMI 30-39.9): ICD-10-CM

## 2018-08-14 PROCEDURE — 99214 OFFICE O/P EST MOD 30 MIN: CPT | Performed by: FAMILY MEDICINE

## 2018-08-14 NOTE — PROGRESS NOTES
cc: Blood pressure    Subjective:     Nabilaracion Reyes Villagracia is a 86 y.o. female presenting with her daughter who occasionally helps translate into tagalog to establish care:    1. Hypertension: Has hx of arterial hypertension, has been stable, but occasionally goes up to 170s systolic in the middle the night when she is anxious.  Is on lisinopril 40 mg daily, metoprolol 25 mg daily, amlodipine 5 mg daily.  Denies any chest pain, shortness of breath, leg swelling, dizziness. She does occasionally get lightheaded, but this is quite rare.  She also does feel quite tired during the day, has difficulty sleeping at times.     2. Dyslipidemia: Has a history of elevated cholesterol. Is currently on lovastatin 20 mg daily. Denies any side effects, no myalgias.    3. Arthritis: She has osteoarthritis in her knees bilaterally for many years. Is currently using a cane, but needs a replacement. Is quite old. She uses a cane so that she can ambulate better and avoid falling. She rarely takes Aleve.      Review of systems:  See above. Positive for Occasional constipation, for which she takes senna. All other systems are reviewed and are negative.      Current Outpatient Prescriptions:   •  SENNA PO, Take  by mouth., Disp: , Rfl:   •  Naproxen Sodium (ALEVE PO), Take  by mouth., Disp: , Rfl:   •  Misc. Devices Misc, One cane.  Height 4f 11.5in, Disp: 1 Units, Rfl: 0  •  lisinopril (PRINIVIL, ZESTRIL) 40 MG tablet, TAKE 1 TAB BY MOUTH EVERY DAY., Disp: 90 Tab, Rfl: 3  •  metoprolol SR (TOPROL XL) 25 MG TABLET SR 24 HR, TAKE 1 TAB BY MOUTH EVERY DAY., Disp: 90 Tab, Rfl: 2  •  lovastatin (MEVACOR) 20 MG Tab, TAKE 1 TABLET BY MOUTH DAILY, Disp: 90 Tab, Rfl: 3  •  amLODIPine (NORVASC) 5 MG Tab, TAKE 1 TAB BY MOUTH EVERY DAY., Disp: 90 Tab, Rfl: 3  •  M-VIT PO, Take  by mouth every day., Disp: , Rfl:     Allergies, past medical history, past surgical history, family history, social history reviewed and updated    Objective:  "    Vitals: /76   Pulse 74   Temp 37 °C (98.6 °F)   Resp 16   Ht 1.511 m (4' 11.5\")   Wt 69.4 kg (153 lb)   SpO2 94%   BMI 30.39 kg/m²   General: Alert, pleasant, NAD  HEENT: Normocephalic.  PERRL, EOMI, no icterus or pallor.  Conjunctivae and lids normal. External ears normal. Oropharynx non-erythematous, mucous membranes moist.  Neck supple.  No thyromegaly or masses palpated. No cervical or supraclavicular lymphadenopathy.  Heart: Regular rate and rhythm.  S1 and S2 normal.  No murmurs appreciated.  Respiratory: Normal respiratory effort.  Clear to auscultation bilaterally.  Abdomen: Non-distended, soft  Skin: Warm, dry, no rashes.  Musculoskeletal: Moves all extremities well.  Extremities: No leg edema  Psych:  Affect/mood is normal, judgement is good, memory is intact, grooming is appropriate.    Assessment/Plan:     Diagnoses and all orders for this visit:    Essential hypertension  Stable, continue current medications. Recommended trying the lisinopril at night. We'll also check an overnight oximetry study given the labile blood pressures and fatigue.    Dyslipidemia  Stable, continue lovastatin    Fatigue, unspecified type  Insomnia, unspecified type  Labs 2 months ago were normal. We'll check an outpatient overnight oximetry study    Primary osteoarthritis of both knees  Stable, we'll try to get patient another cane   -     Misc. Devices Misc; One cane.  Height 4f 11.5in    Menopause  Screening for osteoporosis  -     DS-BONE DENSITY STUDY (DEXA); Future    Obesity (BMI 30-39.9)  -     Patient identified as having weight management issue.  Appropriate orders and counseling given.      Return in about 6 months (around 2/14/2019) for Med check.           Face to Face Note  -  Durable Medical Equipment    Yogesh Torres M.D. - NPI: [id number]  I certify that this patient is under my care and that they had a durable medical equipment(DME)face to face encounter by myself that meets the physician " DME face-to-face encounter requirements with this patient on:    Date of encounter:   Patient:                    MRN:                       YOB: 2018  Veneracion Reyes Villagracia  [id number]  4/17/1932     The encounter with the patient was in whole, or in part, for the following medical condition, which is the primary reason for durable medical equipment:  Other - arthritis    I certify that, based on my findings, the following durable medical equipment is medically necessary:  Cane.      My Clinical findings support the need for the above equipment due to:  Abnormal Gait, Frequent Falls, Other - joint pain    Supporting Symptoms: joint pain

## 2018-08-16 RX ORDER — OMEPRAZOLE 20 MG/1
20 CAPSULE, DELAYED RELEASE ORAL DAILY
Qty: 30 CAP | Refills: 3 | Status: SHIPPED | OUTPATIENT
Start: 2018-08-16 | End: 2018-12-26 | Stop reason: SDUPTHER

## 2018-08-29 ENCOUNTER — TELEPHONE (OUTPATIENT)
Dept: MEDICAL GROUP | Facility: MEDICAL CENTER | Age: 83
End: 2018-08-29

## 2018-09-04 ENCOUNTER — TELEPHONE (OUTPATIENT)
Dept: MEDICAL GROUP | Facility: MEDICAL CENTER | Age: 83
End: 2018-09-04

## 2018-09-04 ENCOUNTER — HOSPITAL ENCOUNTER (OUTPATIENT)
Dept: RADIOLOGY | Facility: MEDICAL CENTER | Age: 83
End: 2018-09-04
Attending: SURGERY
Payer: MEDICARE

## 2018-09-04 DIAGNOSIS — D24.2 BENIGN NEOPLASM OF LEFT BREAST: ICD-10-CM

## 2018-09-04 PROCEDURE — G0279 TOMOSYNTHESIS, MAMMO: HCPCS | Mod: LT

## 2018-09-04 NOTE — TELEPHONE ENCOUNTER
1. Caller Name: yrn                                         Call Back Number: [phone] (home)         Patient approves a detailed voicemail message: yes    left patient message to call back to see how we can help

## 2018-09-06 DIAGNOSIS — M17.0 PRIMARY OSTEOARTHRITIS OF BOTH KNEES: ICD-10-CM

## 2018-12-03 DIAGNOSIS — I49.3 PVC (PREMATURE VENTRICULAR CONTRACTION): ICD-10-CM

## 2018-12-03 RX ORDER — METOPROLOL SUCCINATE 25 MG/1
25 TABLET, EXTENDED RELEASE ORAL
Qty: 90 TAB | Refills: 2 | Status: SHIPPED | OUTPATIENT
Start: 2018-12-03 | End: 2019-12-23

## 2018-12-03 NOTE — TELEPHONE ENCOUNTER
Was the patient seen in the last year in this department? Yes    Does patient have an active prescription for medications requested? Yes    Received Request Via: Pharmacy     Pharmacy is requesting Metoprolol refill

## 2018-12-26 RX ORDER — OMEPRAZOLE 20 MG/1
20 CAPSULE, DELAYED RELEASE ORAL DAILY
Qty: 90 CAP | Refills: 3 | Status: SHIPPED | OUTPATIENT
Start: 2018-12-26 | End: 2019-06-18

## 2019-03-31 DIAGNOSIS — I10 ESSENTIAL HYPERTENSION: ICD-10-CM

## 2019-04-01 RX ORDER — AMLODIPINE BESYLATE 5 MG/1
5 TABLET ORAL DAILY
Qty: 90 TAB | Refills: 3 | Status: SHIPPED | OUTPATIENT
Start: 2019-04-01 | End: 2020-03-19 | Stop reason: SDUPTHER

## 2019-04-01 RX ORDER — LOVASTATIN 20 MG/1
20 TABLET ORAL DAILY
Qty: 90 TAB | Refills: 3 | Status: SHIPPED | OUTPATIENT
Start: 2019-04-01 | End: 2020-03-19 | Stop reason: SDUPTHER

## 2019-04-18 RX ORDER — LISINOPRIL 40 MG/1
40 TABLET ORAL DAILY
Qty: 90 TAB | Refills: 0 | Status: SHIPPED | OUTPATIENT
Start: 2019-04-18 | End: 2019-07-13 | Stop reason: SDUPTHER

## 2019-06-18 ENCOUNTER — HOSPITAL ENCOUNTER (OUTPATIENT)
Dept: LAB | Facility: MEDICAL CENTER | Age: 84
End: 2019-06-18
Attending: FAMILY MEDICINE
Payer: MEDICARE

## 2019-06-18 ENCOUNTER — OFFICE VISIT (OUTPATIENT)
Dept: MEDICAL GROUP | Facility: MEDICAL CENTER | Age: 84
End: 2019-06-18
Payer: MEDICARE

## 2019-06-18 ENCOUNTER — HOSPITAL ENCOUNTER (OUTPATIENT)
Dept: RADIOLOGY | Facility: MEDICAL CENTER | Age: 84
End: 2019-06-18
Attending: FAMILY MEDICINE
Payer: MEDICARE

## 2019-06-18 VITALS
HEIGHT: 59 IN | OXYGEN SATURATION: 95 % | BODY MASS INDEX: 30.24 KG/M2 | TEMPERATURE: 98.6 F | WEIGHT: 150 LBS | HEART RATE: 74 BPM | SYSTOLIC BLOOD PRESSURE: 146 MMHG | DIASTOLIC BLOOD PRESSURE: 82 MMHG | RESPIRATION RATE: 14 BRPM

## 2019-06-18 DIAGNOSIS — M25.462 BILATERAL KNEE SWELLING: ICD-10-CM

## 2019-06-18 DIAGNOSIS — K92.2 GASTROINTESTINAL HEMORRHAGE, UNSPECIFIED GASTROINTESTINAL HEMORRHAGE TYPE: ICD-10-CM

## 2019-06-18 DIAGNOSIS — M25.461 BILATERAL KNEE SWELLING: ICD-10-CM

## 2019-06-18 DIAGNOSIS — K52.9 COLITIS: ICD-10-CM

## 2019-06-18 DIAGNOSIS — N81.4 PROLAPSED UTERUS: ICD-10-CM

## 2019-06-18 DIAGNOSIS — M25.562 CHRONIC PAIN OF BOTH KNEES: ICD-10-CM

## 2019-06-18 DIAGNOSIS — M25.561 CHRONIC PAIN OF BOTH KNEES: ICD-10-CM

## 2019-06-18 DIAGNOSIS — I10 ESSENTIAL HYPERTENSION: ICD-10-CM

## 2019-06-18 DIAGNOSIS — M17.0 PRIMARY OSTEOARTHRITIS OF BOTH KNEES: ICD-10-CM

## 2019-06-18 DIAGNOSIS — G89.29 CHRONIC PAIN OF BOTH KNEES: ICD-10-CM

## 2019-06-18 DIAGNOSIS — D64.9 ANEMIA, UNSPECIFIED TYPE: ICD-10-CM

## 2019-06-18 DIAGNOSIS — E78.5 DYSLIPIDEMIA: ICD-10-CM

## 2019-06-18 LAB
ALBUMIN SERPL BCP-MCNC: 3.9 G/DL (ref 3.2–4.9)
ALBUMIN/GLOB SERPL: 0.8 G/DL
ALP SERPL-CCNC: 88 U/L (ref 30–99)
ALT SERPL-CCNC: 19 U/L (ref 2–50)
ANION GAP SERPL CALC-SCNC: 7 MMOL/L (ref 0–11.9)
AST SERPL-CCNC: 26 U/L (ref 12–45)
BASOPHILS # BLD AUTO: 0.9 % (ref 0–1.8)
BASOPHILS # BLD: 0.08 K/UL (ref 0–0.12)
BILIRUB SERPL-MCNC: 0.4 MG/DL (ref 0.1–1.5)
BUN SERPL-MCNC: 22 MG/DL (ref 8–22)
CALCIUM SERPL-MCNC: 10.9 MG/DL (ref 8.5–10.5)
CHLORIDE SERPL-SCNC: 101 MMOL/L (ref 96–112)
CO2 SERPL-SCNC: 26 MMOL/L (ref 20–33)
CREAT SERPL-MCNC: 0.88 MG/DL (ref 0.5–1.4)
EOSINOPHIL # BLD AUTO: 0.24 K/UL (ref 0–0.51)
EOSINOPHIL NFR BLD: 2.7 % (ref 0–6.9)
ERYTHROCYTE [DISTWIDTH] IN BLOOD BY AUTOMATED COUNT: 49.1 FL (ref 35.9–50)
FASTING STATUS PATIENT QL REPORTED: NORMAL
FERRITIN SERPL-MCNC: 553 NG/ML (ref 10–291)
GLOBULIN SER CALC-MCNC: 4.7 G/DL (ref 1.9–3.5)
GLUCOSE SERPL-MCNC: 96 MG/DL (ref 65–99)
HCT VFR BLD AUTO: 37.9 % (ref 37–47)
HGB BLD-MCNC: 11.8 G/DL (ref 12–16)
IMM GRANULOCYTES # BLD AUTO: 0.02 K/UL (ref 0–0.11)
IMM GRANULOCYTES NFR BLD AUTO: 0.2 % (ref 0–0.9)
IRON SATN MFR SERPL: 32 % (ref 15–55)
IRON SERPL-MCNC: 102 UG/DL (ref 40–170)
LIPASE SERPL-CCNC: 61 U/L (ref 11–82)
LYMPHOCYTES # BLD AUTO: 3.5 K/UL (ref 1–4.8)
LYMPHOCYTES NFR BLD: 39.3 % (ref 22–41)
MCH RBC QN AUTO: 31.6 PG (ref 27–33)
MCHC RBC AUTO-ENTMCNC: 31.1 G/DL (ref 33.6–35)
MCV RBC AUTO: 101.6 FL (ref 81.4–97.8)
MONOCYTES # BLD AUTO: 0.93 K/UL (ref 0–0.85)
MONOCYTES NFR BLD AUTO: 10.4 % (ref 0–13.4)
NEUTROPHILS # BLD AUTO: 4.14 K/UL (ref 2–7.15)
NEUTROPHILS NFR BLD: 46.5 % (ref 44–72)
NRBC # BLD AUTO: 0 K/UL
NRBC BLD-RTO: 0 /100 WBC
PLATELET # BLD AUTO: 319 K/UL (ref 164–446)
PMV BLD AUTO: 10.6 FL (ref 9–12.9)
POTASSIUM SERPL-SCNC: 4.5 MMOL/L (ref 3.6–5.5)
PROT SERPL-MCNC: 8.6 G/DL (ref 6–8.2)
RBC # BLD AUTO: 3.73 M/UL (ref 4.2–5.4)
SODIUM SERPL-SCNC: 134 MMOL/L (ref 135–145)
TIBC SERPL-MCNC: 315 UG/DL (ref 250–450)
TSH SERPL DL<=0.005 MIU/L-ACNC: 1.76 UIU/ML (ref 0.38–5.33)
URATE SERPL-MCNC: 7.4 MG/DL (ref 1.9–8.2)
VIT B12 SERPL-MCNC: 1046 PG/ML (ref 211–911)
WBC # BLD AUTO: 8.9 K/UL (ref 4.8–10.8)

## 2019-06-18 PROCEDURE — 83690 ASSAY OF LIPASE: CPT

## 2019-06-18 PROCEDURE — 73565 X-RAY EXAM OF KNEES: CPT

## 2019-06-18 PROCEDURE — 83540 ASSAY OF IRON: CPT

## 2019-06-18 PROCEDURE — 99215 OFFICE O/P EST HI 40 MIN: CPT | Performed by: FAMILY MEDICINE

## 2019-06-18 PROCEDURE — 85025 COMPLETE CBC W/AUTO DIFF WBC: CPT

## 2019-06-18 PROCEDURE — 83550 IRON BINDING TEST: CPT

## 2019-06-18 PROCEDURE — 84443 ASSAY THYROID STIM HORMONE: CPT

## 2019-06-18 PROCEDURE — 82607 VITAMIN B-12: CPT

## 2019-06-18 PROCEDURE — 84550 ASSAY OF BLOOD/URIC ACID: CPT

## 2019-06-18 PROCEDURE — 80053 COMPREHEN METABOLIC PANEL: CPT

## 2019-06-18 PROCEDURE — 36415 COLL VENOUS BLD VENIPUNCTURE: CPT

## 2019-06-18 PROCEDURE — 82728 ASSAY OF FERRITIN: CPT

## 2019-06-18 RX ORDER — OMEPRAZOLE 20 MG/1
20 CAPSULE, DELAYED RELEASE ORAL 2 TIMES DAILY
Qty: 180 CAP | Refills: 0 | Status: SHIPPED | OUTPATIENT
Start: 2019-06-18 | End: 2019-09-12 | Stop reason: SDUPTHER

## 2019-06-18 RX ORDER — LANOLIN ALCOHOL/MO/W.PET/CERES
325 CREAM (GRAM) TOPICAL DAILY
Qty: 90 TAB | Refills: 0 | Status: SHIPPED | OUTPATIENT
Start: 2019-06-18 | End: 2019-08-16

## 2019-06-18 ASSESSMENT — PATIENT HEALTH QUESTIONNAIRE - PHQ9: CLINICAL INTERPRETATION OF PHQ2 SCORE: 0

## 2019-06-18 NOTE — PROGRESS NOTES
cc:  GI bleed    Subjective:     Veneracion Reyes Villagracia is a 87 y.o. female presenting with her 2 daughters, who are translating, for:    1.  GI bleed, colitis: The patient had moved to the [facility], but recently returned due to a recent hospitalization.  They report that she was hospitalized 5/31-5/5 or 5/6 in the [facility] after the patient developed bright red blood per rectum and abdominal pain.  She denied having any fevers, nausea, vomiting, diarrhea.  She has chronic constipation, but this has been stable.  She denies any aspirin, Advil, NSAID use, alcohol use prior to hospitalization.  They bring in lab reports, CT scans.  No discharge summary or treatment summary available.  The patient reports she got some sort of antibiotics.  Since discharge, she reports some fatigue.  Denies any chest pain, shortness of breath, lightheadedness, dizziness, bleeding, abdominal pain.  Constipation has been stable.  In reviewing the records, it appears that her white blood cell count was elevated, hemoglobin was low.  CT scan of the abdomen showed mucosal thickening of the colon, otherwise unremarkable.  An ultrasound of the abdomen and a pelvic ultrasound were unremarkable.    2.  Knee pain and swelling: During her hospitalization, she reports developing bilateral knee swelling and pain.  Was quite warm and red for about 2 days, but that has resolved.  When she was discharged from the hospital, she took Advil twice a day for about 3 days.  She reports a history of knee arthritis with swelling in the past, reports getting an injection years ago, which seemed to help.  They are requesting a wheelchair    3.  Prolapsed uterus: The patient's daughter reports that the physician at the hospital reported that the patient has a prolapsed uterus.  They were planning to do surgery to improve this in the [facility], but the family decided to wait and come back to the [place].  The patient does have difficulty urinating, would  "like it repaired      Review of systems:  See above.       Current Outpatient Prescriptions:   •  ferrous sulfate 325 (65 Fe) MG EC tablet, Take 1 Tab by mouth every day., Disp: 90 Tab, Rfl: 0  •  omeprazole (PRILOSEC) 20 MG delayed-release capsule, Take 1 Cap by mouth 2 times a day., Disp: 180 Cap, Rfl: 0  •  Misc. Devices Misc, One wheelchair, Disp: 1 Units, Rfl: 0  •  lisinopril (PRINIVIL, ZESTRIL) 40 MG tablet, Take 1 Tab by mouth every day., Disp: 90 Tab, Rfl: 0  •  lovastatin (MEVACOR) 20 MG Tab, Take 1 Tab by mouth every day., Disp: 90 Tab, Rfl: 3  •  amLODIPine (NORVASC) 5 MG Tab, Take 1 Tab by mouth every day., Disp: 90 Tab, Rfl: 3  •  metoprolol SR (TOPROL XL) 25 MG TABLET SR 24 HR, Take 1 Tab by mouth every day., Disp: 90 Tab, Rfl: 2  •  M-VIT PO, Take  by mouth every day., Disp: , Rfl:     Allergies, past medical history, past surgical history, family history, social history reviewed and updated    Objective:     Vitals: /82 (BP Location: Right arm, Patient Position: Sitting)   Pulse 74   Temp 37 °C (98.6 °F) (Temporal)   Resp 14   Ht 1.511 m (4' 11.49\")   Wt 68 kg (150 lb)   SpO2 95%   BMI 29.80 kg/m²   General: Alert, pleasant, NAD  HEENT: Normocephalic.  EOMI, no icterus or pallor.  Conjunctivae and lids normal. External ears normal. Oropharynx non-erythematous, mucous membranes moist.  Neck supple.  No thyromegaly or masses palpated. No cervical or supraclavicular lymphadenopathy.  Heart: Regular rate and rhythm.  S1 and S2 normal.  No murmurs appreciated.  Respiratory: Normal respiratory effort.  Clear to auscultation bilaterally.  Abdomen: Non-distended, soft. non-tender, no guarding/rebound. Bowel sounds present.  No hepatosplenomegaly.  No hernias.  Skin: Warm, dry, no rashes.  Musculoskeletal: bilateral knee swelling, no redness, tenderness  Extremities: No leg edema.  Psych:  Affect/mood is normal, judgement is good, memory is intact, grooming is appropriate.    Assessment/Plan: "     Diagnoses and all orders for this visit:    Gastrointestinal hemorrhage, unspecified gastrointestinal hemorrhage type  Colitis  New problem.  Will check the following labs, start omeprazole, iron supplementation.  Recommended that she take the iron supplement with vitamin C.  Referral to GI placed.  Follow-up in a month  -     CBC WITH DIFFERENTIAL; Future  -     Comp Metabolic Panel; Future  -     LIPASE; Future  -     IRON/TOTAL IRON BIND; Future  -     FERRITIN; Future  -     REFERRAL TO GASTROENTEROLOGY  -     ferrous sulfate 325 (65 Fe) MG EC tablet; Take 1 Tab by mouth every day.  -     omeprazole (PRILOSEC) 20 MG delayed-release capsule; Take 1 Cap by mouth 2 times a day.    Anemia, unspecified type  New problem, likely due to her acute bleed.  We will check the following labs, start omeprazole, iron supplementation.  Follow-up in a month  -     CBC WITH DIFFERENTIAL; Future  -     Comp Metabolic Panel; Future  -     IRON/TOTAL IRON BIND; Future  -     FERRITIN; Future  -     TSH WITH REFLEX TO FT4; Future  -     VITAMIN B12; Future  -     REFERRAL TO GASTROENTEROLOGY    Essential hypertension  Stable, continue current medications    Dyslipidemia  Stable, continue lovastatin    Bilateral knee swelling  Chronic pain of both knees  Primary osteoarthritis of both knees  Worsening, will check x-rays and a uric acid level.  Advised to avoid NSAIDs, she can take Tylenol.  We discussed a referral to sports medicine if her symptoms persist  -     URIC ACID; Future  -     DX-KNEES-AP BILATERAL STANDING; Future  -     Misc. Devices Misc; One wheelchair    Prolapsed uterus  New problem.  Referral placed  -     REFERRAL TO OB/GYN        Return in about 4 weeks (around 7/16/2019) for routine follow up, (long, 40min appt).      Patient was seen for a total of 50 minutes face-to-face by myself, with more than half of the time spent counseling treatment and coordinating care regarding her hypertension, dyslipidemia, knee  pain and swelling, arthritis of the knees, prolapse uterus         Face to Face Note  -  Durable Medical Equipment    Yogesh Torres M.D. - NPI: [id number]  I certify that this patient is under my care and that they had a durable medical equipment(DME)face to face encounter by myself that meets the physician DME face-to-face encounter requirements with this patient on:    Date of encounter:   Patient:                    MRN:                       YOB: 2019  Veneracion Reyes Villagracia  [id number]  4/17/1932     The encounter with the patient was in whole, or in part, for the following medical condition, which is the primary reason for durable medical equipment:  Other - arthritis    I certify that, based on my findings, the following durable medical equipment is medically necessary:  Wheel Chair.         My Clinical findings support the need for the above equipment due to:  Abnormal Gait, Other - knee swelling    Supporting Symptoms: knee pain, swelling         Patient has a mobility limitation that significantly impairs their ability to participate in one or more mobility related activities of daily living.    Mobility limitation is not sufficiently resolved by the use of an appropriately fitted cane or walker in the home.    Patient has caregiver to propel in wheelchair.    Mobility limitations are resolved by using a wheelchair.

## 2019-06-19 PROBLEM — E83.52 HYPERCALCEMIA: Status: ACTIVE | Noted: 2019-06-19

## 2019-06-21 ENCOUNTER — TELEPHONE (OUTPATIENT)
Dept: MEDICAL GROUP | Facility: MEDICAL CENTER | Age: 84
End: 2019-06-21

## 2019-06-21 NOTE — TELEPHONE ENCOUNTER
Phone Number Called:  [phone]    Call outcome: left message for patient to call back regarding message below    Message: see above

## 2019-06-21 NOTE — TELEPHONE ENCOUNTER
----- Message from Yogesh Torres M.D. sent at 6/19/2019  2:23 PM PDT -----  Please let pt know that her knee x-ray shows significant arthritis, is bone on bone.  Let me know if she would like a referral to sports medicine for an evaluation.    Yogesh Torres M.D.  [address]             Please let pt know that her labs look ok.  Blood counts are almost back to normal.  Her calcium concentration is slightly abnormal, we will recheck this at her next visit, no intervention needed at this time.

## 2019-07-14 RX ORDER — LISINOPRIL 40 MG/1
40 TABLET ORAL DAILY
Qty: 90 TAB | Refills: 1 | Status: SHIPPED | OUTPATIENT
Start: 2019-07-14 | End: 2019-12-16

## 2019-07-30 ENCOUNTER — OFFICE VISIT (OUTPATIENT)
Dept: MEDICAL GROUP | Facility: MEDICAL CENTER | Age: 84
End: 2019-07-30
Payer: MEDICARE

## 2019-07-30 VITALS
HEART RATE: 74 BPM | RESPIRATION RATE: 16 BRPM | WEIGHT: 150 LBS | SYSTOLIC BLOOD PRESSURE: 122 MMHG | OXYGEN SATURATION: 95 % | TEMPERATURE: 98.6 F | DIASTOLIC BLOOD PRESSURE: 72 MMHG | HEIGHT: 59 IN | BODY MASS INDEX: 30.24 KG/M2

## 2019-07-30 DIAGNOSIS — E55.9 VITAMIN D DEFICIENCY: ICD-10-CM

## 2019-07-30 DIAGNOSIS — Z78.0 MENOPAUSE: ICD-10-CM

## 2019-07-30 DIAGNOSIS — E78.5 DYSLIPIDEMIA: ICD-10-CM

## 2019-07-30 DIAGNOSIS — E83.52 HYPERCALCEMIA: ICD-10-CM

## 2019-07-30 DIAGNOSIS — I10 ESSENTIAL HYPERTENSION: ICD-10-CM

## 2019-07-30 DIAGNOSIS — R77.9 ELEVATED BLOOD PROTEIN: ICD-10-CM

## 2019-07-30 DIAGNOSIS — H91.90 HEARING LOSS, UNSPECIFIED HEARING LOSS TYPE, UNSPECIFIED LATERALITY: ICD-10-CM

## 2019-07-30 DIAGNOSIS — Z00.00 MEDICARE ANNUAL WELLNESS VISIT, SUBSEQUENT: ICD-10-CM

## 2019-07-30 DIAGNOSIS — K92.2 GASTROINTESTINAL HEMORRHAGE, UNSPECIFIED GASTROINTESTINAL HEMORRHAGE TYPE: ICD-10-CM

## 2019-07-30 DIAGNOSIS — D64.9 ANEMIA, UNSPECIFIED TYPE: ICD-10-CM

## 2019-07-30 DIAGNOSIS — K21.9 GASTROESOPHAGEAL REFLUX DISEASE, ESOPHAGITIS PRESENCE NOT SPECIFIED: ICD-10-CM

## 2019-07-30 DIAGNOSIS — Z13.820 SCREENING FOR OSTEOPOROSIS: ICD-10-CM

## 2019-07-30 DIAGNOSIS — M17.0 PRIMARY OSTEOARTHRITIS OF BOTH KNEES: ICD-10-CM

## 2019-07-30 PROBLEM — E66.9 OBESITY (BMI 30-39.9): Status: RESOLVED | Noted: 2018-08-14 | Resolved: 2019-07-30

## 2019-07-30 PROCEDURE — 99213 OFFICE O/P EST LOW 20 MIN: CPT | Mod: 25 | Performed by: FAMILY MEDICINE

## 2019-07-30 PROCEDURE — G0439 PPPS, SUBSEQ VISIT: HCPCS | Performed by: FAMILY MEDICINE

## 2019-07-30 ASSESSMENT — ACTIVITIES OF DAILY LIVING (ADL): BATHING_REQUIRES_ASSISTANCE: 0

## 2019-07-30 ASSESSMENT — PATIENT HEALTH QUESTIONNAIRE - PHQ9: CLINICAL INTERPRETATION OF PHQ2 SCORE: 0

## 2019-07-30 ASSESSMENT — ENCOUNTER SYMPTOMS: GENERAL WELL-BEING: FAIR

## 2019-07-30 NOTE — PROGRESS NOTES
Cc: gi bleed, wellness    HPI:  Veneracion Reyes Villagracia is a 87 y.o. here for Medicare Annual Wellness Visit     Patient Active Problem List    Diagnosis Date Noted   • Vitamin D deficiency 07/30/2019   • Hypercalcemia 06/19/2019   • Essential hypertension 08/14/2018   • Primary osteoarthritis of both knees 08/14/2018   • GERD (gastroesophageal reflux disease) 07/18/2014   • Dyslipidemia 07/18/2014       Current Outpatient Prescriptions   Medication Sig Dispense Refill   • lisinopril (PRINIVIL, ZESTRIL) 40 MG tablet Take 1 Tab by mouth every day. 90 Tab 1   • ferrous sulfate 325 (65 Fe) MG EC tablet Take 1 Tab by mouth every day. 90 Tab 0   • omeprazole (PRILOSEC) 20 MG delayed-release capsule Take 1 Cap by mouth 2 times a day. 180 Cap 0   • lovastatin (MEVACOR) 20 MG Tab Take 1 Tab by mouth every day. 90 Tab 3   • amLODIPine (NORVASC) 5 MG Tab Take 1 Tab by mouth every day. 90 Tab 3   • metoprolol SR (TOPROL XL) 25 MG TABLET SR 24 HR Take 1 Tab by mouth every day. 90 Tab 2   • M-VIT PO Take  by mouth every day.       No current facility-administered medications for this visit.             Current supplements as per medication list.       Allergies: Penicillins    Current social contact/activities: traveling, seeing family     She  reports that she has never smoked. She has never used smokeless tobacco. She reports that she does not drink alcohol or use drugs.  Counseling given: Yes      DPA/Advanced Directive:  Patient does not have an Advanced Directive.  A packet and workshop information was given on Advanced Directives.    ROS:    Gait: Uses a cane. Uses a wheelchair if walking for long periods  Ostomy: No  Other tubes: No  Amputations: No  Chronic oxygen use: No  Last eye exam: 2018  Wears hearing aids: No   : Reports urinary leakage during the last 6 months that has somewhat interfered with their daily activities or sleep.    Screening:    Depression Screening    Little interest or pleasure in doing  things?  0 - not at all  Feeling down, depressed , or hopeless? 0 - not at all  Patient Health Questionnaire Score: 0     If depressive symptoms identified deferred to follow up visit unless specifically addressed in assessment and plan.    Interpretation of PHQ-9 Total Score   Score Severity   1-4 No Depression   5-9 Mild Depression   10-14 Moderate Depression   15-19 Moderately Severe Depression   20-27 Severe Depression    Screening for Cognitive Impairment    Three Minute Recall (village, kitchen, baby) 0/3    Cali clock face with all 12 numbers and set the hands to show 10 past 10.  Yes 5/5  Cognitive concerns identified deferred for follow up unless specifically addressed in assessment and plan.    Fall Risk Assessment    Has the patient had two or more falls in the last year or any fall with injury in the last year?  No    Safety Assessment    Throw rugs on floor.  Yes  Handrails on all stairs.  Yes  Good lighting in all hallways.  Yes  Difficulty hearing.  No  Patient counseled about all safety risks that were identified.    Functional Assessment ADLs    Are there any barriers preventing you from cooking for yourself or meeting nutritional needs?  No.    Are there any barriers preventing you from driving safely or obtaining transportation?  No.    Are there any barriers preventing you from using a telephone or calling for help?  No.    Are there any barriers preventing you from shopping?  No.    Are there any barriers preventing you from taking care of your own finances?  No.    Are there any barriers preventing you from managing your medications?  No.    Are there any barriers preventing you from showering, bathing or dressing yourself?  No.    Are you currently engaging in any exercise or physical activity?  Yes.     What is your perception of your health?  Fair.      Health Maintenance Summary                Annual Wellness Visit Overdue 4/17/1932     IMM DTaP/Tdap/Td Vaccine Postponed 8/14/2019  "Originally 4/17/1951. Insurance/Financial    IMM ZOSTER VACCINES Postponed 8/14/2019 Originally 4/17/1982. Insurance/Financial    BONE DENSITY Postponed 1/30/2020 Originally 4/17/1997. Provider advises postponing for medical reasons    IMM INFLUENZA Next Due 9/1/2019      Done 2/8/2017 Imm Admin: Influenza Vaccine Adult HD    MAMMOGRAM Next Due 9/4/2019      Done 9/4/2018 MA-MAMMO DIAGNOSTIC UNILAT W/TOMOSYNTHESIS W/CAD     Patient has more history with this topic...          Patient Care Team:  Yogesh Torres M.D. as PCP - General (Family Medicine)        Social History   Substance Use Topics   • Smoking status: Never Smoker   • Smokeless tobacco: Never Used   • Alcohol use No     Family History   Problem Relation Age of Onset   • Stroke Father    • Breast Cancer Sister    • Alcohol abuse Brother      She  has a past medical history of Arthritis; Cataract; Dental disorder; GI bleed; Hypercholesteremia; and Hypertension.   Past Surgical History:   Procedure Laterality Date   • BREAST BIOPSY Left 6/14/2018    Procedure: BREAST BIOPSY - EXCISION MASS;  Surgeon: Kacie Lizama M.D.;  Location: SURGERY [facility];  Service: General   • OTHER      Yony cataracts       Exam:   /72 (BP Location: Right arm, Patient Position: Sitting)   Pulse 74   Temp 37 °C (98.6 °F) (Temporal)   Resp 16   Ht 1.511 m (4' 11.49\")   Wt 68 kg (150 lb)   SpO2 95%  Body mass index is 29.8 kg/m².    Hearing poor.    Dentition upper and lower dentures  Alert, oriented in no acute distress.  Eye contact is good, speech goal directed, affect calm    Assessment and Plan. The following treatment and monitoring plan is recommended:      Medicare annual wellness visit, subsequent  -     Initial Annual Wellness Visit - Includes PPPS ()    Gastrointestinal hemorrhage, unspecified gastrointestinal hemorrhage type  Improving, resolved.  Recommended following up with GI.    Anemia, unspecified type  Improving, continue current " medications.  -     CBC WITHOUT DIFFERENTIAL; Future  -     Comp Metabolic Panel; Future  -     SPEP W/REFLEX TO YARELIS, A, G, M; Future    Hypercalcemia  New problem, will check the following  -     Comp Metabolic Panel; Future  -     PTH INTACT; Future  -     VITAMIN D,25 HYDROXY; Future  -     SPEP W/REFLEX TO YARELIS, A, G, M; Future    Vitamin D deficiency  Possibly stable, will recheck a level  -     VITAMIN D,25 HYDROXY; Future    Elevated blood protein  New problem, will check the following  -     SPEP W/REFLEX TO YARELIS, A, G, M; Future    Primary osteoarthritis of both knees  Stable, continue with the cane, wheelchair as needed  -     Initial Annual Wellness Visit - Includes PPPS ()    Gastroesophageal reflux disease, esophagitis presence not specified  Stable, continue omeprazole  -     Initial Annual Wellness Visit - Includes PPPS ()    Essential hypertension  Stable, continue amlodipine, lisinopril, metoprolol  -     Initial Annual Wellness Visit - Includes PPPS ()    Dyslipidemia  Stable, continue lovastatin  -     Comp Metabolic Panel; Future  -     Lipid Profile; Future  -     Initial Annual Wellness Visit - Includes PPPS ()    Hearing loss, unspecified hearing loss type, unspecified laterality  Stable, patient is ready for an evaluation of her hearing  -     REFERRAL TO AUDIOLOGY  -     Initial Annual Wellness Visit - Includes PPPS ()    Menopause  Stable, due for a DEXA scan  -     DS-BONE DENSITY STUDY (DEXA); Future  -     Initial Annual Wellness Visit - Includes PPPS ()    Screening for osteoporosis  -     DS-BONE DENSITY STUDY (DEXA); Future  -     Initial Annual Wellness Visit - Includes PPPS ()      Services suggested: No services needed at this time  Health Care Screening: Age-appropriate preventive services recommended by USPTF and ACIP covered by Medicare were discussed today. Services ordered if indicated and agreed upon by the patient.  Referrals offered:  Community-based lifestyle interventions to reduce health risks and promote self-management and wellness, fall prevention, nutrition, physical activity, tobacco-use cessation, weight loss, and mental health services as per orders if indicated.    Discussion today about general wellness and lifestyle habits:    · Prevent falls and reduce trip hazards; Cautioned about securing or removing rugs.  · Have a working fire alarm and carbon monoxide detector;   · Engage in regular physical activity and social activities     Follow-up: Return in about 3 months (around 10/30/2019) for routine follow up.

## 2019-07-30 NOTE — PROGRESS NOTES
"cc:  Gi bleed, wellness    Subjective:     Veneracion Reyes Villagracia is a 87 y.o. female presenting with her daughter for a follow-up of her GI bleed.  Since her last visit, she reports feeling back to baseline.  Denies any chest pain, shortness of breath, lightheadedness, dizziness, leg swelling, abdominal pain, diarrhea, constipation, blood in the urine or stools.  She continues to take the iron supplement with no problems.  She has not schedule an appointment with GI yet.  On her last labs, her anemia was close to normal.      Review of systems:  See above.       Current Outpatient Prescriptions:   •  lisinopril (PRINIVIL, ZESTRIL) 40 MG tablet, Take 1 Tab by mouth every day., Disp: 90 Tab, Rfl: 1  •  ferrous sulfate 325 (65 Fe) MG EC tablet, Take 1 Tab by mouth every day., Disp: 90 Tab, Rfl: 0  •  omeprazole (PRILOSEC) 20 MG delayed-release capsule, Take 1 Cap by mouth 2 times a day., Disp: 180 Cap, Rfl: 0  •  lovastatin (MEVACOR) 20 MG Tab, Take 1 Tab by mouth every day., Disp: 90 Tab, Rfl: 3  •  amLODIPine (NORVASC) 5 MG Tab, Take 1 Tab by mouth every day., Disp: 90 Tab, Rfl: 3  •  metoprolol SR (TOPROL XL) 25 MG TABLET SR 24 HR, Take 1 Tab by mouth every day., Disp: 90 Tab, Rfl: 2  •  M-VIT PO, Take  by mouth every day., Disp: , Rfl:     Allergies, past medical history, past surgical history, family history, social history reviewed and updated    Objective:     Vitals: /72 (BP Location: Right arm, Patient Position: Sitting)   Pulse 74   Temp 37 °C (98.6 °F) (Temporal)   Resp 16   Ht 1.511 m (4' 11.49\")   Wt 68 kg (150 lb)   SpO2 95%   BMI 29.80 kg/m²   General: Alert, pleasant, NAD  HEENT: Normocephalic.   Heart: Regular rate and rhythm.  S1 and S2 normal.  No murmurs appreciated.  Respiratory: Normal respiratory effort.  Clear to auscultation bilaterally.  Abdomen: Non-distended, soft. non-tender, no guarding/rebound. Bowel sounds present.  No hepatosplenomegaly.  No hernias.  Skin: Warm, " dry, no rashes.  Musculoskeletal: Gait is normal.  Moves all extremities well.  Extremities: No leg edema.  Psych:  Affect/mood is normal, judgement is good, memory is intact, grooming is appropriate.    Assessment/Plan:     Diagnoses and all orders for this visit:    Gastrointestinal hemorrhage, unspecified gastrointestinal hemorrhage type  Resolved.  Recommended that she make that appointment with the GI doctor.  Follow-up in 3 months    Anemia, unspecified type  Improving.  Continue with iron and omeprazole.  We will recheck the following labs.  If her CBC is normal, will stop iron  -     CBC WITHOUT DIFFERENTIAL; Future  -     Comp Metabolic Panel; Future  -     SPEP W/REFLEX TO YARELIS, A, G, M; Future    Hypercalcemia  New problem, noted on recent labs.  She does not take excessive calcium supplements.  We will recheck the following  -     Comp Metabolic Panel; Future  -     PTH INTACT; Future  -     VITAMIN D,25 HYDROXY; Future  -     SPEP W/REFLEX TO YARELIS, A, G, M; Future    Vitamin D deficiency  Possibly stable, possibly the cause of her hypercalcemia as well.  We will recheck and treat if appropriate  -     VITAMIN D,25 HYDROXY; Future    Elevated blood protein  New problem, noted on recent labs.  Will check the following  -     SPEP W/REFLEX TO YARELIS, A, G, M; Future      Return in about 3 months (around 10/30/2019) for routine follow up.

## 2019-08-09 ENCOUNTER — HOSPITAL ENCOUNTER (OUTPATIENT)
Dept: LAB | Facility: MEDICAL CENTER | Age: 84
End: 2019-08-09
Attending: FAMILY MEDICINE
Payer: MEDICARE

## 2019-08-09 DIAGNOSIS — R77.9 ELEVATED BLOOD PROTEIN: ICD-10-CM

## 2019-08-09 DIAGNOSIS — E78.5 DYSLIPIDEMIA: ICD-10-CM

## 2019-08-09 DIAGNOSIS — E55.9 VITAMIN D DEFICIENCY: ICD-10-CM

## 2019-08-09 DIAGNOSIS — E83.52 HYPERCALCEMIA: ICD-10-CM

## 2019-08-09 DIAGNOSIS — D64.9 ANEMIA, UNSPECIFIED TYPE: ICD-10-CM

## 2019-08-09 LAB
25(OH)D3 SERPL-MCNC: 23 NG/ML (ref 30–100)
ALBUMIN SERPL BCP-MCNC: 4.1 G/DL (ref 3.2–4.9)
ALBUMIN/GLOB SERPL: 1.1 G/DL
ALP SERPL-CCNC: 78 U/L (ref 30–99)
ALT SERPL-CCNC: 16 U/L (ref 2–50)
ANION GAP SERPL CALC-SCNC: 7 MMOL/L (ref 0–11.9)
AST SERPL-CCNC: 20 U/L (ref 12–45)
BILIRUB SERPL-MCNC: 0.4 MG/DL (ref 0.1–1.5)
BUN SERPL-MCNC: 25 MG/DL (ref 8–22)
CALCIUM SERPL-MCNC: 10.7 MG/DL (ref 8.5–10.5)
CHLORIDE SERPL-SCNC: 105 MMOL/L (ref 96–112)
CHOLEST SERPL-MCNC: 189 MG/DL (ref 100–199)
CO2 SERPL-SCNC: 24 MMOL/L (ref 20–33)
CREAT SERPL-MCNC: 0.82 MG/DL (ref 0.5–1.4)
ERYTHROCYTE [DISTWIDTH] IN BLOOD BY AUTOMATED COUNT: 57 FL (ref 35.9–50)
FASTING STATUS PATIENT QL REPORTED: NORMAL
GLOBULIN SER CALC-MCNC: 3.8 G/DL (ref 1.9–3.5)
GLUCOSE SERPL-MCNC: 108 MG/DL (ref 65–99)
HCT VFR BLD AUTO: 40.3 % (ref 37–47)
HDLC SERPL-MCNC: 44 MG/DL
HGB BLD-MCNC: 12.6 G/DL (ref 12–16)
LDLC SERPL CALC-MCNC: 66 MG/DL
MCH RBC QN AUTO: 32.6 PG (ref 27–33)
MCHC RBC AUTO-ENTMCNC: 31.3 G/DL (ref 33.6–35)
MCV RBC AUTO: 104.1 FL (ref 81.4–97.8)
PLATELET # BLD AUTO: 184 K/UL (ref 164–446)
PMV BLD AUTO: 11.6 FL (ref 9–12.9)
POTASSIUM SERPL-SCNC: 4.4 MMOL/L (ref 3.6–5.5)
PROT SERPL-MCNC: 7.9 G/DL (ref 6–8.2)
PTH-INTACT SERPL-MCNC: 66.1 PG/ML (ref 14–72)
RBC # BLD AUTO: 3.87 M/UL (ref 4.2–5.4)
SODIUM SERPL-SCNC: 136 MMOL/L (ref 135–145)
TRIGL SERPL-MCNC: 394 MG/DL (ref 0–149)
WBC # BLD AUTO: 11.8 K/UL (ref 4.8–10.8)

## 2019-08-09 PROCEDURE — 82306 VITAMIN D 25 HYDROXY: CPT

## 2019-08-09 PROCEDURE — 82784 ASSAY IGA/IGD/IGG/IGM EACH: CPT

## 2019-08-09 PROCEDURE — 85027 COMPLETE CBC AUTOMATED: CPT

## 2019-08-09 PROCEDURE — 84160 ASSAY OF PROTEIN ANY SOURCE: CPT

## 2019-08-09 PROCEDURE — 36415 COLL VENOUS BLD VENIPUNCTURE: CPT

## 2019-08-09 PROCEDURE — 83970 ASSAY OF PARATHORMONE: CPT

## 2019-08-09 PROCEDURE — 80061 LIPID PANEL: CPT

## 2019-08-09 PROCEDURE — 80053 COMPREHEN METABOLIC PANEL: CPT

## 2019-08-09 PROCEDURE — 86334 IMMUNOFIX E-PHORESIS SERUM: CPT

## 2019-08-09 PROCEDURE — 84165 PROTEIN E-PHORESIS SERUM: CPT

## 2019-08-11 ENCOUNTER — HOSPITAL ENCOUNTER (EMERGENCY)
Facility: MEDICAL CENTER | Age: 84
End: 2019-08-11
Attending: EMERGENCY MEDICINE
Payer: MEDICARE

## 2019-08-11 VITALS
SYSTOLIC BLOOD PRESSURE: 126 MMHG | TEMPERATURE: 98.6 F | OXYGEN SATURATION: 100 % | RESPIRATION RATE: 16 BRPM | DIASTOLIC BLOOD PRESSURE: 68 MMHG | BODY MASS INDEX: 31.16 KG/M2 | HEIGHT: 59 IN | HEART RATE: 74 BPM | WEIGHT: 154.54 LBS

## 2019-08-11 DIAGNOSIS — R31.9 URINARY TRACT INFECTION WITH HEMATURIA, SITE UNSPECIFIED: ICD-10-CM

## 2019-08-11 DIAGNOSIS — N39.0 URINARY TRACT INFECTION WITH HEMATURIA, SITE UNSPECIFIED: ICD-10-CM

## 2019-08-11 LAB
APPEARANCE UR: ABNORMAL
BACTERIA #/AREA URNS HPF: NEGATIVE /HPF
BILIRUB UR QL STRIP.AUTO: NEGATIVE
COLOR UR: YELLOW
EPI CELLS #/AREA URNS HPF: NEGATIVE /HPF
GLUCOSE UR STRIP.AUTO-MCNC: NEGATIVE MG/DL
HYALINE CASTS #/AREA URNS LPF: ABNORMAL /LPF
KETONES UR STRIP.AUTO-MCNC: NEGATIVE MG/DL
LEUKOCYTE ESTERASE UR QL STRIP.AUTO: ABNORMAL
MICRO URNS: ABNORMAL
NITRITE UR QL STRIP.AUTO: NEGATIVE
PH UR STRIP.AUTO: 6 [PH] (ref 5–8)
PROT UR QL STRIP: 30 MG/DL
RBC # URNS HPF: ABNORMAL /HPF
RBC UR QL AUTO: ABNORMAL
SP GR UR STRIP.AUTO: <=1.005
UROBILINOGEN UR STRIP.AUTO-MCNC: 0.2 MG/DL
WBC #/AREA URNS HPF: ABNORMAL /HPF

## 2019-08-11 PROCEDURE — 87086 URINE CULTURE/COLONY COUNT: CPT

## 2019-08-11 PROCEDURE — 87186 SC STD MICRODIL/AGAR DIL: CPT | Mod: 91

## 2019-08-11 PROCEDURE — 81001 URINALYSIS AUTO W/SCOPE: CPT

## 2019-08-11 PROCEDURE — 87077 CULTURE AEROBIC IDENTIFY: CPT

## 2019-08-11 PROCEDURE — 99283 EMERGENCY DEPT VISIT LOW MDM: CPT

## 2019-08-11 RX ORDER — SULFAMETHOXAZOLE AND TRIMETHOPRIM 800; 160 MG/1; MG/1
1 TABLET ORAL 2 TIMES DAILY
Qty: 14 TAB | Refills: 0 | Status: SHIPPED | OUTPATIENT
Start: 2019-08-11 | End: 2019-08-16

## 2019-08-11 NOTE — ED PROVIDER NOTES
ED Provider Note    CHIEF COMPLAINT  Chief Complaint   Patient presents with   • Urinating Blood     since yesterday    • Urinary Frequency       HPI  Veneracion Reyes Villagracia is a 87 y.o. female who presents urinary frequency with hematuria this morning.  Hematuria has subsequently resolved.  No fever.  No flank pain.  She otherwise feels well with normal appetite.  Patient is concerned about infection, presents for evaluation    REVIEW OF SYSTEMS  Constitutional: No fever or dizziness  Respiratory: No shortness of breath  Cardiac: No chest pain or syncope  Gastrointestinal: No abdominal pain  Musculoskeletal: No flank pain  Neurologic: No headache  Genitourinary: Frequency, hematuria          PAST MEDICAL HISTORY  Past Medical History:   Diagnosis Date   • Arthritis     knees   • Cataract    • Dental disorder     full dentures   • GI bleed     in the [facility]   • Hypercholesteremia    • Hypertension        FAMILY HISTORY  Family History   Problem Relation Age of Onset   • Stroke Father    • Breast Cancer Sister    • Alcohol abuse Brother        SOCIAL HISTORY  Social History     Socioeconomic History   • Marital status:      Spouse name: Not on file   • Number of children: Not on file   • Years of education: Not on file   • Highest education level: Not on file   Occupational History   • Not on file   Social Needs   • Financial resource strain: Not on file   • Food insecurity:     Worry: Not on file     Inability: Not on file   • Transportation needs:     Medical: Not on file     Non-medical: Not on file   Tobacco Use   • Smoking status: Never Smoker   • Smokeless tobacco: Never Used   Substance and Sexual Activity   • Alcohol use: No   • Drug use: No   • Sexual activity: Not on file   Lifestyle   • Physical activity:     Days per week: Not on file     Minutes per session: Not on file   • Stress: Not on file   Relationships   • Social connections:     Talks on phone: Not on file     Gets together: Not  "on file     Attends Yarsani service: Not on file     Active member of club or organization: Not on file     Attends meetings of clubs or organizations: Not on file     Relationship status: Not on file   • Intimate partner violence:     Fear of current or ex partner: Not on file     Emotionally abused: Not on file     Physically abused: Not on file     Forced sexual activity: Not on file   Other Topics Concern   • Not on file   Social History Narrative   • Not on file       SURGICAL HISTORY  Past Surgical History:   Procedure Laterality Date   • BREAST BIOPSY Left 6/14/2018    Procedure: BREAST BIOPSY - EXCISION MASS;  Surgeon: Kacie Lizama M.D.;  Location: SURGERY [facility];  Service: General   • OTHER      Yony cataracts       CURRENT MEDICATIONS  Home Medications     Reviewed by Phyllis Tejada R.N. (Registered Nurse) on 08/11/19 at 1353  Med List Status: Partial   Medication Last Dose Status   amLODIPine (NORVASC) 5 MG Tab 8/11/2019 Active   ferrous sulfate 325 (65 Fe) MG EC tablet 8/11/2019 Active   lisinopril (PRINIVIL, ZESTRIL) 40 MG tablet 8/11/2019 Active   lovastatin (MEVACOR) 20 MG Tab 8/10/2019 Active   M-VIT PO 8/11/2019 Active   metoprolol SR (TOPROL XL) 25 MG TABLET SR 24 HR 8/11/2019 Active   omeprazole (PRILOSEC) 20 MG delayed-release capsule 8/11/2019 Active                ALLERGIES  Allergies   Allergen Reactions   • Penicillins Rash       PHYSICAL EXAM  VITAL SIGNS: /60   Pulse 78   Temp 37 °C (98.6 °F) (Temporal)   Resp 16   Ht 1.499 m (4' 11\")   Wt 70.1 kg (154 lb 8.7 oz)   SpO2 100%   BMI 31.21 kg/m²   Constitutional: Well-nourished, no distress  ENT: Nares clear, mucous membranes moist.  Eyes:  Conjunctiva normal, No discharge.     Cardiovascular: Normal heart rate, Normal rhythm.   Pulmonary: No wheezing, no rales  Gastrointestinal: Soft, nontender, no guarding.  No pain over McBurney's point  Skin: Warm, Dry, No jaundice.  No abnormal bruising  Musculoskeletal:  No CVA " tenderness.   Neurologic:  Normal motor and sensory function, No focal deficits noted.   Psychiatric:Normal affect, Normal mood.    RADIOLOGY/PROCEDURES/Labs  Results for orders placed or performed during the hospital encounter of 08/11/19   URINALYSIS CULTURE, IF INDICATED   Result Value Ref Range    Color Yellow     Character Hazy (A)     Specific Gravity <=1.005 <1.035    Ph 6.0 5.0 - 8.0    Glucose Negative Negative mg/dL    Ketones Negative Negative mg/dL    Protein 30 (A) Negative mg/dL    Bilirubin Negative Negative    Urobilinogen, Urine 0.2 Negative    Nitrite Negative Negative    Leukocyte Esterase Large (A) Negative    Occult Blood Large (A) Negative    Micro Urine Req Microscopic    URINE MICROSCOPIC (W/UA)   Result Value Ref Range    WBC Packed (A) /hpf    RBC 20-50 (A) /hpf    Bacteria Negative None /hpf    Epithelial Cells Negative /hpf    Hyaline Cast 3-5 (A) /lpf         COURSE & MEDICAL DECISION MAKING  Pertinent Labs & Imaging studies reviewed. (See chart for details)  Patient with urinary tract infection, no evidence otherwise of toxicity.  Vital signs are normal, no flank tenderness, I do not believe this to be related to pyelonephritis.  Hematuria likely secondary to the infection.  Patient will be placed on antibiotics for 7 days, advised to follow-up in 2 days for no improvement, sooner if worse or for any concerns.  Plan for Bactrim.  Patient well-appearing upon discharge    FINAL IMPRESSION  1. Urinary tract infection with hematuria, site unspecified             Electronically signed by: Hans Nair, 8/11/2019 2:34 PM

## 2019-08-11 NOTE — ED NOTES
Pt received discharge instructions and prescription, pt understood all including follow up, pt to lobby in wheel chair with son with no difficulty

## 2019-08-11 NOTE — DISCHARGE INSTRUCTIONS
See your doctor for recheck if no improvement in 3 days.  See your doctor sooner if worse or return to the emergency department

## 2019-08-11 NOTE — ED TRIAGE NOTES
Pt to triage .  Chief Complaint   Patient presents with   • Urinating Blood     since yesterday    • Urinary Frequency

## 2019-08-12 ENCOUNTER — TELEPHONE (OUTPATIENT)
Dept: MEDICAL GROUP | Facility: MEDICAL CENTER | Age: 84
End: 2019-08-12

## 2019-08-12 RX ORDER — ERGOCALCIFEROL 1.25 MG/1
50000 CAPSULE ORAL
Qty: 12 CAP | Refills: 0 | Status: CANCELLED | OUTPATIENT
Start: 2019-08-12 | End: 2019-11-10

## 2019-08-13 LAB
ALBUMIN SERPL ELPH-MCNC: 4.12 G/DL (ref 3.75–5.01)
ALPHA1 GLOB SERPL ELPH-MCNC: 0.33 G/DL (ref 0.19–0.46)
ALPHA2 GLOB SERPL ELPH-MCNC: 0.77 G/DL (ref 0.48–1.05)
B-GLOBULIN SERPL ELPH-MCNC: 1.15 G/DL (ref 0.48–1.1)
BACTERIA UR CULT: ABNORMAL
GAMMA GLOB SERPL ELPH-MCNC: 1.52 G/DL (ref 0.62–1.51)
IGA SERPL-MCNC: 546 MG/DL (ref 68–408)
IGG SERPL-MCNC: 1480 MG/DL (ref 768–1632)
IGM SERPL-MCNC: 60 MG/DL (ref 35–263)
INTERPRETATION SERPL IFE-IMP: ABNORMAL
MONOCLON BAND OBS SERPL: ABNORMAL
PATHOLOGY STUDY: ABNORMAL
PROT SERPL-MCNC: 7.9 G/DL (ref 6–8.3)
SIGNIFICANT IND 70042: ABNORMAL
SITE SITE: ABNORMAL
SOURCE SOURCE: ABNORMAL

## 2019-08-13 NOTE — H&P
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    HISTORY OF PRESENT ILLNESS:  This is an 87-year-old woman  10, para 9,   spontaneous  1, status post a previous hysterectomy who was referred   from her primary care provider for evaluation of her near-complete vaginal   prolapse, pelvic pain, mixed urinary incontinence who wished to proceed   forward with attempted definitive surgical correction with native tissue   repair in the form of an anterior and posterior vaginal repair, enterocele   repair, perineoplasty, sacrospinous vault suspension and transobturator tape   mid urethral sling procedure.  Risks, benefits, alternatives of all individual   portions of the surgery were addressed with the patient in detail.  She has   asked appropriate questions, signed the appropriate consents, and wished to   proceed forward with the scheduled surgery as planned.  She does understand   the limitations of surgery in addressing her pelvic pain, overactive bladder   symptoms that these symptoms may be unimproved or actually worsen with surgery   requiring additional medical or surgical management and even despite these   limitations, she is wishing to proceed forward with the scheduled surgery at   this time.    PAST MEDICAL HISTORY:  Hypercholesterolemia, chronic hypertension.    PAST SURGICAL HISTORY:  Breast surgery in 2017.  Hysterectomy done previously,   the patient is unaware of the date of the surgery.  Nine previous deliveries,   1 spontaneous .    GYNECOLOGIC HISTORY:  The patient is amenorrheic and has been amenorrheic   since age 54.  She reports pelvic pressure, a bulge of vaginal mucosa   protruding through the vaginal introitus with significant pelvic pressure,   pain, mixed urinary incontinence symptoms requiring  daily pad therapy.  She   denies any previous sexually transmitted diseases or pelvic infections.    FAMILY HISTORY:  Noncontributory.    REVIEW OF SYSTEMS:  Otherwise unremarkable.    SOCIAL HISTORY:  She is , retired.  She denies use of any alcohol,   tobacco, or recreational drug use.    MEDICATIONS:  Atorvastatin 40 mg tablets p.o. daily.    ALLERGIES:  TO PENICILLIN.    PHYSICAL EXAMINATION  GENERAL:  She is afebrile, hemodynamically stable.  CURRENT VITAL SIGNS:  Can be seen on electronic medical record.  HEART:  Regular rate and rhythm.  CHEST:  Clear to auscultation bilaterally.  ABDOMEN:  Soft, nondistended, nontender, bowel sounds are present.  PELVIC:  Exam shows atrophic external female genitalia, vaginal vault, grade   II anterior, posterior and apical vaginal relaxation with near grade II to III   apical vaginal relaxation.  Rectovaginal exam confirmed the above.  She is   guaiac negative.  EXTREMITIES:  Nontender.    Urodynamic studies did demonstrate and confirm type 2 stress urinary   incontinence.    LABORATORY DATA:  Urinalysis was within normal limits.    ASSESSMENT AND PLAN:  An 87-year-old woman who elects to proceed forward with   attempted definitive surgical correction for her near-complete vaginal   prolapse, pelvic pain, mixed urinary incontinence, strong type 2 stress   urinary incontinent component with the surgery as described above,   understanding the limitations of surgery, scheduled for 09/03/2019.       ____________________________________     MD JAG SPARKS / VLADIMIR    DD:  08/08/2019 17:21:58  DT:  08/08/2019 18:00:32    D#:  [id number]  Job#:  [id number]

## 2019-08-14 NOTE — ED NOTES
"ED Positive Culture Follow-up/Notification Note:   Date: 8/14/19    Patient seen in the ED on 8/11/2019 for hematuria and urinary frequency.   1. Urinary tract infection with hematuria, site unspecified      Discharge Medication List as of 8/11/2019  2:57 PM      START taking these medications    Details   sulfamethoxazole-trimethoprim (BACTRIM DS) 800-160 MG tablet Take 1 Tab by mouth 2 times a day for 7 days., Disp-14 Tab, R-0, Print Rx Paper           Allergies: Penicillins    Vitals:    08/11/19 1347 08/11/19 1350 08/11/19 1449   BP: 127/60  126/68   Pulse: 78  74   Resp: 16  16   Temp: 37 °C (98.6 °F)     TempSrc: Temporal     SpO2: 100%     Weight:  70.1 kg (154 lb 8.7 oz)    Height: 1.499 m (4' 11\")         Final cultures:   Results     Procedure Component Value Units Date/Time    URINE CULTURE(NEW) [[id number]]  (Abnormal)  (Susceptibility) Collected:  08/11/19 1415    Order Status:  Completed Specimen:  Urine Updated:  08/13/19 0656     Significant Indicator POS     Source UR     Site -     Culture Result -      Escherichia coli  ,000 cfu/mL        Escherichia coli  ,000 cfu/mL  Second Biotype      Susceptibility     Escherichia coli (1)     Antibiotic Interpretation Microscan Method Status    Ceftriaxone Sensitive <=8 mcg/mL BRANDEN Final    Ceftazidime Sensitive <=1 mcg/mL BRANDEN Final    Cephalothin Sensitive <=8 mcg/mL BRANDEN Final    Cefotaxime Sensitive <=2 mcg/mL BRANDEN Final    Ciprofloxacin Sensitive <=1 mcg/mL BRANDEN Final    Cefepime Sensitive <=8 mcg/mL BRANDEN Final    Cefuroxime Sensitive <=4 mcg/mL BRANDEN Final    Ampicillin Sensitive <=8 mcg/mL BRANDEN Final    Cefotetan Sensitive <=16 mcg/mL BRANDEN Final    Tobramycin Sensitive <=4 mcg/mL BRANDEN Final    Nitrofurantoin Sensitive <=32 mcg/mL BRANDEN Final    Gentamicin Sensitive <=4 mcg/mL BRANDEN Final    Levofloxacin Sensitive <=2 mcg/mL BRANDEN Final    Pip/Tazobactam Sensitive <=16 mcg/mL BRANDEN Final    Piperacillin Sensitive <=16 mcg/mL BRANDEN Final    Trimeth/Sulfa " Sensitive <=2/38 mcg/mL BRANDEN Final    Tigecycline Sensitive <=2 mcg/mL BRANDEN Final          Escherichia coli (2)     Antibiotic Interpretation Microscan Method Status    Ceftriaxone Sensitive <=8 mcg/mL BRANDEN Final    Ceftazidime Sensitive <=1 mcg/mL BRANDEN Final    Cephalothin Resistant >16 mcg/mL BRANDEN Final    Cefotaxime Sensitive <=2 mcg/mL BRANDEN Final    Ciprofloxacin Sensitive <=1 mcg/mL BRANDEN Final    Cefepime Sensitive <=8 mcg/mL BRANDEN Final    Cefuroxime Sensitive <=4 mcg/mL BRANDEN Final    Ampicillin Resistant >16 mcg/mL BRANDEN Final    Cefotetan Sensitive <=16 mcg/mL BRANDEN Final    Tobramycin Sensitive <=4 mcg/mL BRANDEN Final    Nitrofurantoin Sensitive <=32 mcg/mL BRANDEN Final    Gentamicin Intermediate 8 mcg/mL BRANDEN Final    Levofloxacin Sensitive <=2 mcg/mL BRANDEN Final    Pip/Tazobactam Sensitive <=16 mcg/mL BRANDEN Final    Piperacillin Resistant >64 mcg/mL BRANDEN Final    Trimeth/Sulfa Resistant >2/38 mcg/mL BRANDEN Final    Tigecycline Sensitive <=2 mcg/mL BRANDEN Final                   URINALYSIS CULTURE, IF INDICATED [[id number]]  (Abnormal) Collected:  08/11/19 1415    Order Status:  Completed Specimen:  Urine Updated:  08/11/19 1423     Color Yellow     Character Hazy     Specific Gravity <=1.005     Ph 6.0     Glucose Negative mg/dL      Ketones Negative mg/dL      Protein 30 mg/dL      Bilirubin Negative     Urobilinogen, Urine 0.2     Nitrite Negative     Leukocyte Esterase Large     Occult Blood Large     Micro Urine Req Microscopic          Plan:   - One E.coli isolate is sensitive to tmp/smx and the other is resistant.  - Left voicemail requesting a call back.  - If patient is still symptomatic recommend changing tmp/smx to macrobid 100 mg PO BID x 5 days.     Sonam Smith

## 2019-08-16 ENCOUNTER — OFFICE VISIT (OUTPATIENT)
Dept: MEDICAL GROUP | Facility: MEDICAL CENTER | Age: 84
End: 2019-08-16
Payer: MEDICARE

## 2019-08-16 VITALS
BODY MASS INDEX: 30.89 KG/M2 | SYSTOLIC BLOOD PRESSURE: 122 MMHG | DIASTOLIC BLOOD PRESSURE: 66 MMHG | HEIGHT: 59 IN | WEIGHT: 153.22 LBS | HEART RATE: 68 BPM | TEMPERATURE: 98.7 F | OXYGEN SATURATION: 94 %

## 2019-08-16 DIAGNOSIS — I10 ESSENTIAL HYPERTENSION: ICD-10-CM

## 2019-08-16 DIAGNOSIS — E66.9 OBESITY (BMI 30-39.9): ICD-10-CM

## 2019-08-16 DIAGNOSIS — E55.9 VITAMIN D DEFICIENCY: ICD-10-CM

## 2019-08-16 DIAGNOSIS — N30.01 ACUTE CYSTITIS WITH HEMATURIA: ICD-10-CM

## 2019-08-16 DIAGNOSIS — R53.1 WEAKNESS: ICD-10-CM

## 2019-08-16 DIAGNOSIS — F51.02 ADJUSTMENT INSOMNIA: ICD-10-CM

## 2019-08-16 DIAGNOSIS — E83.52 HYPERCALCEMIA: ICD-10-CM

## 2019-08-16 PROCEDURE — 99214 OFFICE O/P EST MOD 30 MIN: CPT | Performed by: FAMILY MEDICINE

## 2019-08-16 RX ORDER — NITROFURANTOIN 25; 75 MG/1; MG/1
100 CAPSULE ORAL 2 TIMES DAILY
Qty: 10 CAP | Refills: 0 | Status: SHIPPED | OUTPATIENT
Start: 2019-08-16 | End: 2019-08-21

## 2019-08-16 RX ORDER — ERGOCALCIFEROL 1.25 MG/1
50000 CAPSULE ORAL
Qty: 12 CAP | Refills: 0 | Status: SHIPPED | OUTPATIENT
Start: 2019-08-16 | End: 2019-11-01 | Stop reason: SDUPTHER

## 2019-08-16 SDOH — HEALTH STABILITY: MENTAL HEALTH: HOW OFTEN DO YOU HAVE A DRINK CONTAINING ALCOHOL?: NEVER

## 2019-08-16 SDOH — HEALTH STABILITY: MENTAL HEALTH: HOW OFTEN DO YOU HAVE 6 OR MORE DRINKS ON ONE OCCASION?: NEVER

## 2019-08-16 NOTE — PROGRESS NOTES
cc:  UTI    Subjective:     Veneracion Reyes Villagracia is a 87 y.o. female presenting with her daughter, Ashli, to discuss UTI, weakness.  Earlier this month, she underwent an evaluation with urogynecology, had a transvaginal ultrasound and urine voiding studies.  After those procedures, she developed burning with urination and blood in the urine.  She went to the emergency department, was diagnosed with a UTI, started on Bactrim.  She has been on the medication for about 5 days.  She continues to describe burning with urination.  Denies any more blood in the urine.  Denies any fevers, vaginal discharge, vaginal bleeding.  She has been feeling somewhat weak lately, vertigo, has difficulty sleeping, poor appetite.  Her blood pressure was elevated at home, but today it is regular.  She continues to take her medications regularly.  Her daughter is wondering what they can do for anxiety and difficulty sleeping.  The daughter believes that she is somewhat anxious about the possible surgery for her prolapse.    Of note, she does report taking calcium citrate twice a day in addition to several multivitamins that include calcium.      Review of systems:  See above.       Current Outpatient Medications:   •  nitrofurantoin monohyd macro (MACROBID) 100 MG Cap, Take 1 Cap by mouth 2 times a day for 5 days., Disp: 10 Cap, Rfl: 0  •  vitamin D, Ergocalciferol, (DRISDOL) 73073 units Cap capsule, Take 1 Cap by mouth every 7 days for 90 days., Disp: 12 Cap, Rfl: 0  •  Calcium Citrate (JUSTIN-CITRATE PO), Take  by mouth., Disp: , Rfl:   •  lisinopril (PRINIVIL, ZESTRIL) 40 MG tablet, Take 1 Tab by mouth every day., Disp: 90 Tab, Rfl: 1  •  omeprazole (PRILOSEC) 20 MG delayed-release capsule, Take 1 Cap by mouth 2 times a day., Disp: 180 Cap, Rfl: 0  •  lovastatin (MEVACOR) 20 MG Tab, Take 1 Tab by mouth every day., Disp: 90 Tab, Rfl: 3  •  amLODIPine (NORVASC) 5 MG Tab, Take 1 Tab by mouth every day., Disp: 90 Tab, Rfl: 3  •   "metoprolol SR (TOPROL XL) 25 MG TABLET SR 24 HR, Take 1 Tab by mouth every day., Disp: 90 Tab, Rfl: 2    Allergies, past medical history, past surgical history, family history, social history reviewed and updated    Objective:     Vitals: /66   Pulse 68   Temp 37.1 °C (98.7 °F)   Ht 1.499 m (4' 11\")   Wt 69.5 kg (153 lb 3.5 oz)   SpO2 94%   BMI 30.95 kg/m²   General: Alert, pleasant, NAD  HEENT: Normocephalic.  EOMI, no icterus or pallor.  Conjunctivae and lids normal. External ears normal. Oropharynx non-erythematous, mucous membranes moist.  Neck supple.  No thyromegaly or masses palpated. No cervical or supraclavicular lymphadenopathy.  Heart: Regular rate and rhythm.  S1 and S2 normal.  No murmurs appreciated.  Respiratory: Normal respiratory effort.  Clear to auscultation bilaterally.  Abdomen: Non-distended, soft  Skin: Warm, dry, no rashes.  Extremities: No leg edema.  Psych:  Affect/mood is normal, judgement is good, memory is intact, grooming is appropriate.    Assessment/Plan:     Veneracion was seen today for extremity weakness, dizziness and insomnia.    Diagnoses and all orders for this visit:    Acute cystitis with hematuria  Weakness  New problem.  Recommended that she stop the Bactrim, will start nitrofurantoin.  Recent kidney function was normal.  We reviewed her recent urine cultures, it grew 2 different strains of E. coli, both should be sensitive to the nitrofurantoin.  1 of the strains was not sensitive to Bactrim.  They will let us know symptoms do not improve.  -     nitrofurantoin monohyd macro (MACROBID) 100 MG Cap; Take 1 Cap by mouth 2 times a day for 5 days.    Hypercalcemia  Discussed her recent labs.  Discussed possible causes of hypercalcemia.  We will correct the vitamin D, she will cut down on her calcium intake, will repeat labs in a couple months.    Vitamin D deficiency  New problem.  We will start weekly supplementation  -     vitamin D, Ergocalciferol, (DRISDOL) " 08366 units Cap capsule; Take 1 Cap by mouth every 7 days for 90 days.      Essential hypertension  Stable, blood pressure was normal today.  Continue current medications    Obesity (BMI 30-39.9)  -     Patient identified as having weight management issue.  Appropriate orders and counseling given.    Adjustment insomnia  Recommended trying melatonin, valerian root, or Unisom.  If no improvement, we discussed trying mirtazapine in the future      Return in about 3 months (around 11/16/2019) for routine follow up.

## 2019-08-19 DIAGNOSIS — Z01.812 PRE-OPERATIVE LABORATORY EXAMINATION: ICD-10-CM

## 2019-08-19 DIAGNOSIS — Z01.810 PRE-OPERATIVE CARDIOVASCULAR EXAMINATION: ICD-10-CM

## 2019-08-19 LAB
ANION GAP SERPL CALC-SCNC: 10 MMOL/L (ref 0–11.9)
BASOPHILS # BLD AUTO: 0.5 % (ref 0–1.8)
BASOPHILS # BLD: 0.05 K/UL (ref 0–0.12)
BUN SERPL-MCNC: 29 MG/DL (ref 8–22)
CALCIUM SERPL-MCNC: 10.4 MG/DL (ref 8.5–10.5)
CHLORIDE SERPL-SCNC: 100 MMOL/L (ref 96–112)
CO2 SERPL-SCNC: 20 MMOL/L (ref 20–33)
CREAT SERPL-MCNC: 1.41 MG/DL (ref 0.5–1.4)
EKG IMPRESSION: NORMAL
EOSINOPHIL # BLD AUTO: 0.04 K/UL (ref 0–0.51)
EOSINOPHIL NFR BLD: 0.4 % (ref 0–6.9)
ERYTHROCYTE [DISTWIDTH] IN BLOOD BY AUTOMATED COUNT: 53.9 FL (ref 35.9–50)
GLUCOSE SERPL-MCNC: 141 MG/DL (ref 65–99)
HCT VFR BLD AUTO: 40.7 % (ref 37–47)
HGB BLD-MCNC: 13 G/DL (ref 12–16)
IMM GRANULOCYTES # BLD AUTO: 0.07 K/UL (ref 0–0.11)
IMM GRANULOCYTES NFR BLD AUTO: 0.7 % (ref 0–0.9)
LYMPHOCYTES # BLD AUTO: 2.79 K/UL (ref 1–4.8)
LYMPHOCYTES NFR BLD: 29 % (ref 22–41)
MCH RBC QN AUTO: 33.1 PG (ref 27–33)
MCHC RBC AUTO-ENTMCNC: 31.9 G/DL (ref 33.6–35)
MCV RBC AUTO: 103.6 FL (ref 81.4–97.8)
MONOCYTES # BLD AUTO: 0.77 K/UL (ref 0–0.85)
MONOCYTES NFR BLD AUTO: 8 % (ref 0–13.4)
NEUTROPHILS # BLD AUTO: 5.89 K/UL (ref 2–7.15)
NEUTROPHILS NFR BLD: 61.4 % (ref 44–72)
NRBC # BLD AUTO: 0 K/UL
NRBC BLD-RTO: 0 /100 WBC
PLATELET # BLD AUTO: 270 K/UL (ref 164–446)
PMV BLD AUTO: 10.1 FL (ref 9–12.9)
POTASSIUM SERPL-SCNC: 4.9 MMOL/L (ref 3.6–5.5)
RBC # BLD AUTO: 3.93 M/UL (ref 4.2–5.4)
SODIUM SERPL-SCNC: 130 MMOL/L (ref 135–145)
WBC # BLD AUTO: 9.6 K/UL (ref 4.8–10.8)

## 2019-08-19 PROCEDURE — 36415 COLL VENOUS BLD VENIPUNCTURE: CPT

## 2019-08-19 PROCEDURE — 80048 BASIC METABOLIC PNL TOTAL CA: CPT

## 2019-08-19 PROCEDURE — 85025 COMPLETE CBC W/AUTO DIFF WBC: CPT

## 2019-08-19 PROCEDURE — 93005 ELECTROCARDIOGRAM TRACING: CPT | Performed by: SPECIALIST

## 2019-08-22 ENCOUNTER — HOSPITAL ENCOUNTER (OUTPATIENT)
Dept: RADIOLOGY | Facility: MEDICAL CENTER | Age: 84
End: 2019-08-22
Attending: FAMILY MEDICINE
Payer: MEDICARE

## 2019-08-22 DIAGNOSIS — Z78.0 MENOPAUSE: ICD-10-CM

## 2019-08-22 DIAGNOSIS — Z13.820 SCREENING FOR OSTEOPOROSIS: ICD-10-CM

## 2019-08-22 PROCEDURE — 77080 DXA BONE DENSITY AXIAL: CPT

## 2019-08-23 PROBLEM — M85.89 OSTEOPENIA OF MULTIPLE SITES: Status: ACTIVE | Noted: 2019-08-23

## 2019-09-03 ENCOUNTER — ANESTHESIA EVENT (OUTPATIENT)
Dept: SURGERY | Facility: MEDICAL CENTER | Age: 84
End: 2019-09-03
Payer: MEDICARE

## 2019-09-03 ENCOUNTER — ANESTHESIA (OUTPATIENT)
Dept: SURGERY | Facility: MEDICAL CENTER | Age: 84
End: 2019-09-03
Payer: MEDICARE

## 2019-09-03 ENCOUNTER — HOSPITAL ENCOUNTER (OUTPATIENT)
Facility: MEDICAL CENTER | Age: 84
End: 2019-09-03
Attending: SPECIALIST | Admitting: SPECIALIST
Payer: MEDICARE

## 2019-09-03 VITALS
SYSTOLIC BLOOD PRESSURE: 125 MMHG | BODY MASS INDEX: 25.44 KG/M2 | HEART RATE: 80 BPM | RESPIRATION RATE: 16 BRPM | WEIGHT: 149.03 LBS | TEMPERATURE: 97.9 F | OXYGEN SATURATION: 96 % | HEIGHT: 64 IN | DIASTOLIC BLOOD PRESSURE: 53 MMHG

## 2019-09-03 PROCEDURE — 700102 HCHG RX REV CODE 250 W/ 637 OVERRIDE(OP): Performed by: ANESTHESIOLOGY

## 2019-09-03 PROCEDURE — 160025 RECOVERY II MINUTES (STATS): Performed by: SPECIALIST

## 2019-09-03 PROCEDURE — 160009 HCHG ANES TIME/MIN: Performed by: SPECIALIST

## 2019-09-03 PROCEDURE — 501838 HCHG SUTURE GENERAL: Performed by: SPECIALIST

## 2019-09-03 PROCEDURE — 501516 HCHG SUTURE, CAPIO: Performed by: SPECIALIST

## 2019-09-03 PROCEDURE — 500892 HCHG PACK, PERI-GYN: Performed by: SPECIALIST

## 2019-09-03 PROCEDURE — 160041 HCHG SURGERY MINUTES - EA ADDL 1 MIN LEVEL 4: Performed by: SPECIALIST

## 2019-09-03 PROCEDURE — 700105 HCHG RX REV CODE 258: Performed by: SPECIALIST

## 2019-09-03 PROCEDURE — 160029 HCHG SURGERY MINUTES - 1ST 30 MINS LEVEL 4: Performed by: SPECIALIST

## 2019-09-03 PROCEDURE — A9270 NON-COVERED ITEM OR SERVICE: HCPCS | Performed by: ANESTHESIOLOGY

## 2019-09-03 PROCEDURE — 501330 HCHG SET, CYSTO IRRIG TUBING: Performed by: SPECIALIST

## 2019-09-03 PROCEDURE — 160002 HCHG RECOVERY MINUTES (STAT): Performed by: SPECIALIST

## 2019-09-03 PROCEDURE — 160047 HCHG PACU  - EA ADDL 30 MINS PHASE II: Performed by: SPECIALIST

## 2019-09-03 PROCEDURE — A4338 INDWELLING CATHETER LATEX: HCPCS | Performed by: SPECIALIST

## 2019-09-03 PROCEDURE — 160035 HCHG PACU - 1ST 60 MINS PHASE I: Performed by: SPECIALIST

## 2019-09-03 PROCEDURE — 502240 HCHG MISC OR SUPPLY RC 0272: Performed by: SPECIALIST

## 2019-09-03 PROCEDURE — C1771 REP DEV, URINARY, W/SLING: HCPCS | Performed by: SPECIALIST

## 2019-09-03 PROCEDURE — 700101 HCHG RX REV CODE 250: Performed by: SPECIALIST

## 2019-09-03 PROCEDURE — 160046 HCHG PACU - 1ST 60 MINS PHASE II: Performed by: SPECIALIST

## 2019-09-03 PROCEDURE — 160048 HCHG OR STATISTICAL LEVEL 1-5: Performed by: SPECIALIST

## 2019-09-03 PROCEDURE — 700111 HCHG RX REV CODE 636 W/ 250 OVERRIDE (IP): Performed by: ANESTHESIOLOGY

## 2019-09-03 DEVICE — SLING TOT OBTRYX I HALO: Type: IMPLANTABLE DEVICE | Site: BLADDER | Status: FUNCTIONAL

## 2019-09-03 RX ORDER — HYDROMORPHONE HYDROCHLORIDE 1 MG/ML
0.1 INJECTION, SOLUTION INTRAMUSCULAR; INTRAVENOUS; SUBCUTANEOUS
Status: DISCONTINUED | OUTPATIENT
Start: 2019-09-03 | End: 2019-09-03 | Stop reason: HOSPADM

## 2019-09-03 RX ORDER — ONDANSETRON 2 MG/ML
4 INJECTION INTRAMUSCULAR; INTRAVENOUS
Status: DISCONTINUED | OUTPATIENT
Start: 2019-09-03 | End: 2019-09-03 | Stop reason: HOSPADM

## 2019-09-03 RX ORDER — HALOPERIDOL 5 MG/ML
1 INJECTION INTRAMUSCULAR
Status: DISCONTINUED | OUTPATIENT
Start: 2019-09-03 | End: 2019-09-03 | Stop reason: HOSPADM

## 2019-09-03 RX ORDER — SODIUM CHLORIDE, SODIUM LACTATE, POTASSIUM CHLORIDE, CALCIUM CHLORIDE 600; 310; 30; 20 MG/100ML; MG/100ML; MG/100ML; MG/100ML
INJECTION, SOLUTION INTRAVENOUS CONTINUOUS
Status: DISCONTINUED | OUTPATIENT
Start: 2019-09-03 | End: 2019-09-03 | Stop reason: HOSPADM

## 2019-09-03 RX ORDER — PROMETHAZINE HYDROCHLORIDE 25 MG/1
12.5 SUPPOSITORY RECTAL EVERY 4 HOURS PRN
Status: DISCONTINUED | OUTPATIENT
Start: 2019-09-03 | End: 2019-09-03 | Stop reason: HOSPADM

## 2019-09-03 RX ORDER — SIMETHICONE 80 MG
80 TABLET,CHEWABLE ORAL EVERY 8 HOURS PRN
Status: DISCONTINUED | OUTPATIENT
Start: 2019-09-03 | End: 2019-09-03 | Stop reason: HOSPADM

## 2019-09-03 RX ORDER — MEPERIDINE HYDROCHLORIDE 25 MG/ML
6.25 INJECTION INTRAMUSCULAR; INTRAVENOUS; SUBCUTANEOUS
Status: DISCONTINUED | OUTPATIENT
Start: 2019-09-03 | End: 2019-09-03 | Stop reason: HOSPADM

## 2019-09-03 RX ORDER — OXYCODONE HYDROCHLORIDE AND ACETAMINOPHEN 5; 325 MG/1; MG/1
2 TABLET ORAL
Status: COMPLETED | OUTPATIENT
Start: 2019-09-03 | End: 2019-09-03

## 2019-09-03 RX ORDER — DIPHENHYDRAMINE HYDROCHLORIDE 50 MG/ML
12.5 INJECTION INTRAMUSCULAR; INTRAVENOUS
Status: DISCONTINUED | OUTPATIENT
Start: 2019-09-03 | End: 2019-09-03 | Stop reason: HOSPADM

## 2019-09-03 RX ORDER — HYDROMORPHONE HYDROCHLORIDE 1 MG/ML
0.2 INJECTION, SOLUTION INTRAMUSCULAR; INTRAVENOUS; SUBCUTANEOUS
Status: DISCONTINUED | OUTPATIENT
Start: 2019-09-03 | End: 2019-09-03 | Stop reason: HOSPADM

## 2019-09-03 RX ORDER — OXYCODONE HYDROCHLORIDE AND ACETAMINOPHEN 5; 325 MG/1; MG/1
1 TABLET ORAL
Status: COMPLETED | OUTPATIENT
Start: 2019-09-03 | End: 2019-09-03

## 2019-09-03 RX ORDER — BUPIVACAINE HYDROCHLORIDE 2.5 MG/ML
INJECTION, SOLUTION EPIDURAL; INFILTRATION; INTRACAUDAL
Status: DISCONTINUED
Start: 2019-09-03 | End: 2019-09-03 | Stop reason: HOSPADM

## 2019-09-03 RX ORDER — HYDROMORPHONE HYDROCHLORIDE 1 MG/ML
0.4 INJECTION, SOLUTION INTRAMUSCULAR; INTRAVENOUS; SUBCUTANEOUS
Status: DISCONTINUED | OUTPATIENT
Start: 2019-09-03 | End: 2019-09-03 | Stop reason: HOSPADM

## 2019-09-03 RX ORDER — BUPIVACAINE HYDROCHLORIDE AND EPINEPHRINE 2.5; 5 MG/ML; UG/ML
INJECTION, SOLUTION EPIDURAL; INFILTRATION; INTRACAUDAL; PERINEURAL
Status: DISCONTINUED | OUTPATIENT
Start: 2019-09-03 | End: 2019-09-03 | Stop reason: HOSPADM

## 2019-09-03 RX ORDER — CEFAZOLIN SODIUM 1 G/3ML
INJECTION, POWDER, FOR SOLUTION INTRAMUSCULAR; INTRAVENOUS PRN
Status: DISCONTINUED | OUTPATIENT
Start: 2019-09-03 | End: 2019-09-03 | Stop reason: SURG

## 2019-09-03 RX ADMIN — PROPOFOL 200 MG: 10 INJECTION, EMULSION INTRAVENOUS at 09:10

## 2019-09-03 RX ADMIN — CEFAZOLIN 1 G: 330 INJECTION, POWDER, FOR SOLUTION INTRAMUSCULAR; INTRAVENOUS at 09:10

## 2019-09-03 RX ADMIN — FENTANYL CITRATE 100 MCG: 50 INJECTION, SOLUTION INTRAMUSCULAR; INTRAVENOUS at 09:10

## 2019-09-03 RX ADMIN — OXYCODONE HYDROCHLORIDE AND ACETAMINOPHEN 1 TABLET: 5; 325 TABLET ORAL at 11:05

## 2019-09-03 RX ADMIN — SODIUM CHLORIDE, POTASSIUM CHLORIDE, SODIUM LACTATE AND CALCIUM CHLORIDE: 600; 310; 30; 20 INJECTION, SOLUTION INTRAVENOUS at 08:17

## 2019-09-03 NOTE — OR NURSING
1017 Received pt from OR, received report from Dr. Porter and OR RN. Pt sleeping, LMA in place, VS WNL. Peripad in place, CDI.     1019 Pt.'s airway dc'd without difficulty. Pt titrated to RA    1025 Pt.'s Daughter called to bedside. Pt is [place] but speaks tagalog and English.    1105 Pt tolerating fluids, medicated with oxy for pain 4/10 to abdomen.     1200 Pt meets discharge criteria, pt dressing with assistance from Daughters. Pt and Daughter given instructions for discharge evidence of understanding demonstrated. Pt.'s Daughter is an RN and will dc f/c tomorrow at home; Dr. Boykin's office notified.     1222 Pt out to car in .

## 2019-09-03 NOTE — ANESTHESIA TIME REPORT
Anesthesia Start and Stop Event Times     Date Time Event    9/3/2019 0856 Ready for Procedure     0910 Anesthesia Start     1019 Anesthesia Stop        Responsible Staff  09/03/19    Name Role Begin End    Jordan Porter M.D. Anesth 0910 1019        Preop Diagnosis (Free Text):  Pre-op Diagnosis     STRESS URINARY INCONTINENCE, CYSTOCELE, RECTOCELE, PELVIC PAIN        Preop Diagnosis (Codes):    Post op Diagnosis  Stress incontinence of urine      Premium Reason  Non-Premium    Comments:

## 2019-09-03 NOTE — OP REPORT
DATE OF SERVICE:  9/3/2019     PREOPERATIVE DIAGNOSES:  Good support of the anterior and the posterior and the apical vaginal tissue without any undue tension at any suture sites, cystoscopy revealed bilateral ureteral efflux, normal bladder and no undue tension at the mid urethra after sling placement.     POSTOPERATIVE DIAGNOSES: Same     PROCEDURES PERFORMED:  Anterior and posterior vaginal repair, enterocele    repair, sacrospinous vault suspension, transobturator tape midurethral sling    procedure, cystoscopy.     SURGEON:  Jim Boykin MD     ASSISTANT:  Kenn Hendrickson MD     ANESTHESIA:  General     ANESTHESIOLOGIST:  Anesthesiologist: Jordan Porter M.D.     ESTIMATED BLOOD LOSS FOR THE PROCEDURE:  Less than 30 mL.     FINDINGS:  Good support of the anterior and posterior vaginal walls in    addition to the apical vaginal tissue without any undue tension in the suture    sites.  Cystoscopy revealed bilateral ureteral efflux and normal bladder and    no undue tension noted at the level of the mid urethra after sling placement    on cystoscopic evaluation.     DESCRIPTION OF PROCEDURE:  The patient was taken to the operating room where    general anesthesia was performed without difficulty.  Patient was then prepped   and draped in usual sterile fashion.  Lower extremities placed in Rinku    stirrups.  Attention was first turned to the perineum where a weighted    speculum was placed with the use of Song retractor.  Anterior vaginal mucosa    was then injected with 10 mL of 0.25% Marcaine with epinephrine.  The vaginal    mucosa was then dissected off the underlying pubocervical fascia thai until    the levator muscles were then reached.  Her previous transobturator tape sling   was resected followed by placement of horizontal mattress sutures    reapproximating the pubocervical fascia in midline in a double layer closure,    thereby reducing the midline cystocele followed by injection of 10 mL of  0.25%   Marcaine with epinephrine lateral to the clitoris in the labial crural folds    followed by stab incision made with the use of 11 blade scalpel in this    location on each side followed by placement of the respective Obtryx halo    needle through the labial crural incision sites to the obturator membranes    through the endopelvic fascia out through the vaginal mucosal incision at the    level of the mid urethra.  The sling was then applied to the Obtryx halo    needle.  Needle was then taken back up through the original tract.  Tensioning   of position was then performed.  Carvalho catheter was removed, which had been    placed at the beginning of the case for placement of a 30-degree cystoscope,    which revealed normal urinary bladder, bilateral ureteral efflux and no undue    tension noted at the level of the mid urethra.  The sling was then released,    tensioning of position was then finalized under direct cystoscopic evaluation.    Cystoscope removed.  Carvalho catheter replaced. The arcus tendinous fascia pelvis was then sutured with 0 Ethibond suture with the straight Capio needle device through the undersurface of the vaginal mucosa and once tied down reducing the paravaginal defect. All excess vaginal mucosa and   sling material were then excised.  The vaginal mucosa was then reapproximated   with 2-0 Vicryl in a running interlocking fashion in one segment.  Posterior    vaginal mucosa was then injected with 10 mL of 0.25% Marcaine with    epinephrine.  The vaginal mucosa was then dissected off the underlying    rectovaginal fascia thai until the levator muscles were then reached.     Horizontal mattress sutures were then used to reapproximate the rectovaginal    fascia in the midline in a double 0 closure, thereby reducing the midline    rectocele.  A large enterocele was located at the superior aspect of the    dissection.  Dissection of the sacrospinous process was then performed in the    ischial  spine, 3 cm medial along the sacrospinous ligament with straight    catheterization needle device, 0 Ethibond suture was taken through the    sacrospinous ligament taken out through the right apical portion of the    vaginal cuff and the overlying vaginal mucosa.  Once tied down, there was good   reapproximation of the apex of the vaginal vault to the sacrospinous    ligament.  The rectovaginal septum was then reapproximated in the posterior    aspect of the vaginal cuff in a double pursestring suture, thereby reducing    the large enterocele located at the superior aspect of the dissection.  All    excess vaginal mucosa was then trimmed.  The vaginal mucosa was then    reapproximated with 2-0 Vicryl in running locking fashion in one segment    performing a perineoplasty on the perineum.  The patient tolerated procedure    well and was awoken from general anesthesia, was taken to the recovery in    stable condition.        ____________________________________     HIMA RUEDA MD

## 2019-09-03 NOTE — ANESTHESIA PROCEDURE NOTES
Airway  Date/Time: 9/3/2019 10:17 AM  Performed by: Jordan Porter M.D.  Authorized by: Jordan Porter M.D.     Location:  OR  Urgency:  Elective  Indications for Airway Management:  Anesthesia  Spontaneous Ventilation: absent    Sedation Level:  Deep  Preoxygenated: Yes    Final Airway Type:  Supraglottic airway  Final Supraglottic Airway:  Standard LMA  SGA Size:  3  Number of Attempts at Approach:  1

## 2019-09-03 NOTE — ANESTHESIA PREPROCEDURE EVALUATION
Relevant Problems   CARDIAC   (+) Essential hypertension      GI   (+) GERD (gastroesophageal reflux disease)       Physical Exam    Airway   Mallampati: II  TM distance: >3 FB  Neck ROM: full       Cardiovascular - normal exam  Rhythm: regular  Rate: normal  (-) murmur     Dental - normal exam         Pulmonary - normal exam  Breath sounds clear to auscultation     Abdominal    Neurological - normal exam                 Anesthesia Plan    ASA 3       Plan - general       Airway plan will be LMA        Induction: intravenous    Postoperative Plan: Postoperative administration of opioids is intended.    Pertinent diagnostic labs and testing reviewed    Informed Consent:    Anesthetic plan and risks discussed with patient.    Use of blood products discussed with: patient whom consented to blood products.

## 2019-09-03 NOTE — DISCHARGE INSTRUCTIONS
ACTIVITY: Rest and take it easy for the first 24 hours.  A responsible adult is recommended to remain with you during that time.  It is normal to feel sleepy.  We encourage you to not do anything that requires balance, judgment or coordination.    MILD FLU-LIKE SYMPTOMS ARE NORMAL. YOU MAY EXPERIENCE GENERALIZED MUSCLE ACHES, THROAT IRRITATION, HEADACHE AND/OR SOME NAUSEA.    FOR 24 HOURS DO NOT:  Drive, operate machinery or run household appliances.  Drink beer or alcoholic beverages.   Make important decisions or sign legal documents.    SPECIAL INSTRUCTIONS: Please remove denney-catheter tomorrow. Please refer to hysterectomy discharge instructions.    DIET: To avoid nausea, slowly advance diet as tolerated, avoiding spicy or greasy foods for the first day.  Add more substantial food to your diet according to your physician's instructions.  Babies can be fed formula or breast milk as soon as they are hungry.  INCREASE FLUIDS AND FIBER TO AVOID CONSTIPATION.    SURGICAL DRESSING/BATHING: May shower after removal of denney cath; no swimming or tub baths for 2 weeks    FOLLOW-UP APPOINTMENT:  A follow-up appointment should be arranged with your doctor as scheduled.    You should CALL YOUR PHYSICIAN if you develop:  Fever greater than 101 degrees F.  Pain not relieved by medication, or persistent nausea or vomiting.  Excessive bleeding (blood soaking through dressing) or unexpected drainage from the wound.  Extreme redness or swelling around the incision site, drainage of pus or foul smelling drainage.  Inability to urinate or empty your bladder within 8 hours.  Problems with breathing or chest pain.    You should call 911 if you develop problems with breathing or chest pain.  If you are unable to contact your doctor or surgical center, you should go to the nearest emergency room or urgent care center.    Physician's telephone #: Dr. Boykin [phone]    If any questions arise, call your doctor.  If your doctor is not  available, please feel free to call the Surgical Center at [phone].  The Center is open Monday through Friday from 7AM to 7PM.  You can also call the HEALTH HOTLINE open 24 hours/day, 7 days/week and speak to a nurse at [phone], or toll free at [phone].    A registered nurse may call you a few days after your surgery to see how you are doing after your procedure.    MEDICATIONS: Resume taking daily medication.  Take prescribed pain medication with food.  If no medication is prescribed, you may take non-aspirin pain medication if needed.  PAIN MEDICATION CAN BE VERY CONSTIPATING.  Take a stool softener or laxative such as senokot, pericolace, or milk of magnesia if needed.    Prescription given for patient has prescription for pain medication.  Last pain medication given at 11:00 am; percocet given.    If your physician has prescribed pain medication that includes Acetaminophen (Tylenol), do not take additional Acetaminophen (Tylenol) while taking the prescribed medication.    Depression / Suicide Risk    As you are discharged from this [facility] facility, it is important to learn how to keep safe from harming yourself.    Recognize the warning signs:  · Abrupt changes in personality, positive or negative- including increase in energy   · Giving away possessions  · Change in eating patterns- significant weight changes-  positive or negative  · Change in sleeping patterns- unable to sleep or sleeping all the time   · Unwillingness or inability to communicate  · Depression  · Unusual sadness, discouragement and loneliness  · Talk of wanting to die  · Neglect of personal appearance   · Rebelliousness- reckless behavior  · Withdrawal from people/activities they love  · Confusion- inability to concentrate     If you or a loved one observes any of these behaviors or has concerns about self-harm, here's what you can do:  · Talk about it- your feelings and reasons for harming yourself  · Remove any  means that you might use to hurt yourself (examples: pills, rope, extension cords, firearm)  · Get professional help from the community (Mental Health, Substance Abuse, psychological counseling)  · Do not be alone:Call your Safe Contact- someone whom you trust who will be there for you.  · Call your local CRISIS HOTLINE 014-0860 or 729-047-2268  · Call your local Children's Mobile Crisis Response Team [place] (991) 718-9025 or www.[facility]  · Call the toll free National Suicide Prevention Hotlines   · National Suicide Prevention Lifeline 614-911-LBFB (6165)  · National Hope Line Network 800-SUICIDE (568-0399)

## 2019-09-03 NOTE — ANESTHESIA QCDR
2019 [facility] Clinical Data Registry (for Quality Improvement)     Postoperative nausea/vomiting risk protocol (Adult = 18 yrs and Pediatric 3-17 yrs)- (430 and 463)  General inhalation anesthetic (NOT TIVA) with PONV risk factors: Yes  Provision of anti-emetic therapy with at least 2 different classes of agents: Yes   Patient DID NOT receive anti-emetic therapy and reason is documented in Medical Record:  N/A    Multimodal Pain Management- (AQI59)  Patient undergoing Elective Surgery (i.e. Outpatient, or ASC, or Prescheduled Surgery prior to Hospital Admission): Yes  Use of Multimodal Pain Management, two or more drugs and/or interventions, NOT including systemic opioids: Yes   Exception: Documented allergy to multiple classes of analgesics:  N/A    PACU assessment of acute postoperative pain prior to Anesthesia Care End- Applies to Patients Age = 18- (ABG7)  Initial PACU pain score is which of the following: < 7/10  Patient unable to report pain score: N/A    Post-anesthetic transfer of care checklist/protocol to PACU/ICU- (426 and 427)  Upon conclusion of case, patient transferred to which of the following locations: PACU/Non-ICU  Use of transfer checklist/protocol: Yes  Exclusion: Service Performed in Patient Hospital Room (and thus did not require transfer): N/A    PACU Reintubation- (AQI31)  General anesthesia requiring endotracheal intubation (ETT) along with subsequent extubation in OR or PACU: No  Required reintubation in the PACU: N/A  Extubation was a planned trial documented in the medical record prior to removal of the original airway device: N/A    Unplanned admission to ICU related to anesthesia service up through end of PACU care- (MD51)  Unplanned admission to ICU (not initially anticipated at anesthesia start time): No

## 2019-09-03 NOTE — OR SURGEON
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    PostOp Diagnosis: Same    Procedure(s):  COLPORRHAPHY, COMBINED ANTEROPOSTERIOR - W/PERINEOPLASTY - Wound Class: Clean Contaminated  REPAIR, ENTEROCELE - Wound Class: Clean Contaminated  BLADDER SLING, FEMALE - TOT - Wound Class: Clean Contaminated  COLPOPEXY - SACROSPINOUS VAULT SUSPENSION - Wound Class: Clean Contaminated    Surgeon(s):  HARINI Garduno M.D.    Anesthesiologist/Type of Anesthesia:  Anesthesiologist: Jordan Porter M.D./General    Surgical Staff:  Circulator: Rafy Quintero R.N.  Scrub Person: Grabiel Enamorado    Specimens removed if any:  None    Estimated Blood Loss: Less than 30ccs    Findings: Good support of the anterior and the posterior and the apical vaginal tissue without any undue tension at any suture sites, cystoscopy revealed bilateral ureteral efflux, normal bladder and no undue tension at the mid urethra after sling placement.    Complications: None        9/3/2019 10:13 AM Jim Boykin M.D.

## 2019-09-03 NOTE — ANESTHESIA POSTPROCEDURE EVALUATION
Patient: Veneracion Reyes Villagracia    Procedure Summary     Date:  09/03/19 Room / Location:  [facility] ROOM 23 / SURGERY SAME DAY [facility]    Anesthesia Start:  0910 Anesthesia Stop:  1019    Procedures:       COLPORRHAPHY, COMBINED ANTEROPOSTERIOR - W/PERINEOPLASTY (N/A Vagina )      REPAIR, ENTEROCELE (N/A Vagina )      BLADDER SLING, FEMALE - TOT (N/A Bladder)      COLPOPEXY - SACROSPINOUS VAULT SUSPENSION (N/A Vagina ) Diagnosis:  (STRESS URINARY INCONTINENCE, CYSTOCELE, RECTOCELE, PELVIC PAIN)    Surgeon:  Jim Boykin M.D. Responsible Provider:  Jordan Porter M.D.    Anesthesia Type:  general ASA Status:  3          Final Anesthesia Type: general  Last vitals  BP   Blood Pressure : 107/55    Temp   37.3 °C (99.2 °F)    Pulse   Pulse: 64   Resp   16    SpO2   99 %      Anesthesia Post Evaluation    Patient location during evaluation: PACU  Patient participation: complete - patient participated  Level of consciousness: awake and alert    Airway patency: patent  Anesthetic complications: no  Cardiovascular status: hemodynamically stable  Respiratory status: acceptable  Hydration status: euvolemic    PONV: none           Nurse Pain Score: 0 (NPRS)

## 2019-09-12 RX ORDER — LANOLIN ALCOHOL/MO/W.PET/CERES
CREAM (GRAM) TOPICAL
Refills: 0 | OUTPATIENT
Start: 2019-09-12

## 2019-09-12 RX ORDER — OMEPRAZOLE 20 MG/1
CAPSULE, DELAYED RELEASE ORAL
Qty: 180 CAP | Refills: 1 | Status: SHIPPED | OUTPATIENT
Start: 2019-09-12 | End: 2020-03-07

## 2019-09-19 RX ORDER — LANOLIN ALCOHOL/MO/W.PET/CERES
CREAM (GRAM) TOPICAL
Qty: 90 TAB | Refills: 0 | Status: SHIPPED | OUTPATIENT
Start: 2019-09-19 | End: 2020-06-09

## 2019-11-01 RX ORDER — ERGOCALCIFEROL 1.25 MG/1
CAPSULE ORAL
Qty: 12 CAP | Refills: 0 | Status: SHIPPED | OUTPATIENT
Start: 2019-11-01 | End: 2020-06-09

## 2019-12-16 RX ORDER — LISINOPRIL 40 MG/1
40 TABLET ORAL DAILY
Qty: 90 TAB | Refills: 1 | Status: SHIPPED | OUTPATIENT
Start: 2019-12-16 | End: 2020-03-19 | Stop reason: SDUPTHER

## 2019-12-21 DIAGNOSIS — I49.3 PVC (PREMATURE VENTRICULAR CONTRACTION): ICD-10-CM

## 2019-12-23 RX ORDER — METOPROLOL SUCCINATE 25 MG/1
25 TABLET, EXTENDED RELEASE ORAL DAILY
Qty: 90 TAB | Refills: 3 | Status: SHIPPED | OUTPATIENT
Start: 2019-12-23 | End: 2020-02-17 | Stop reason: SDUPTHER

## 2020-02-17 DIAGNOSIS — I49.3 PVC (PREMATURE VENTRICULAR CONTRACTION): ICD-10-CM

## 2020-02-18 RX ORDER — METOPROLOL SUCCINATE 25 MG/1
25 TABLET, EXTENDED RELEASE ORAL DAILY
Qty: 90 TAB | Refills: 3 | Status: SHIPPED | OUTPATIENT
Start: 2020-02-18 | End: 2020-03-19 | Stop reason: SDUPTHER

## 2020-03-07 RX ORDER — OMEPRAZOLE 20 MG/1
20 CAPSULE, DELAYED RELEASE ORAL DAILY
Qty: 90 CAP | Refills: 3 | Status: SHIPPED | OUTPATIENT
Start: 2020-03-07 | End: 2021-03-11

## 2020-03-19 DIAGNOSIS — I10 ESSENTIAL HYPERTENSION: ICD-10-CM

## 2020-03-19 DIAGNOSIS — I49.3 PVC (PREMATURE VENTRICULAR CONTRACTION): ICD-10-CM

## 2020-03-19 RX ORDER — LOVASTATIN 20 MG/1
20 TABLET ORAL DAILY
Qty: 90 TAB | Refills: 3 | Status: SHIPPED | OUTPATIENT
Start: 2020-03-19 | End: 2021-10-06 | Stop reason: SDUPTHER

## 2020-03-19 RX ORDER — LISINOPRIL 40 MG/1
40 TABLET ORAL DAILY
Qty: 90 TAB | Refills: 3 | Status: SHIPPED | OUTPATIENT
Start: 2020-03-19 | End: 2020-06-09

## 2020-03-19 RX ORDER — AMLODIPINE BESYLATE 5 MG/1
5 TABLET ORAL DAILY
Qty: 90 TAB | Refills: 3 | Status: SHIPPED | OUTPATIENT
Start: 2020-03-19 | End: 2020-06-09

## 2020-03-19 RX ORDER — METOPROLOL SUCCINATE 25 MG/1
25 TABLET, EXTENDED RELEASE ORAL DAILY
Qty: 90 TAB | Refills: 3 | Status: SHIPPED | OUTPATIENT
Start: 2020-03-19 | End: 2021-02-05

## 2020-06-09 ENCOUNTER — OFFICE VISIT (OUTPATIENT)
Dept: MEDICAL GROUP | Facility: MEDICAL CENTER | Age: 85
End: 2020-06-09
Payer: MEDICARE

## 2020-06-09 VITALS
HEART RATE: 74 BPM | RESPIRATION RATE: 16 BRPM | BODY MASS INDEX: 32.25 KG/M2 | WEIGHT: 160 LBS | TEMPERATURE: 98.6 F | OXYGEN SATURATION: 96 % | DIASTOLIC BLOOD PRESSURE: 78 MMHG | HEIGHT: 59 IN | SYSTOLIC BLOOD PRESSURE: 124 MMHG

## 2020-06-09 DIAGNOSIS — R06.02 SHORTNESS OF BREATH ON EXERTION: ICD-10-CM

## 2020-06-09 DIAGNOSIS — M54.2 NECK PAIN: ICD-10-CM

## 2020-06-09 DIAGNOSIS — H91.90 HEARING LOSS, UNSPECIFIED HEARING LOSS TYPE, UNSPECIFIED LATERALITY: ICD-10-CM

## 2020-06-09 DIAGNOSIS — I10 ESSENTIAL HYPERTENSION: ICD-10-CM

## 2020-06-09 DIAGNOSIS — R20.2 PARESTHESIAS: ICD-10-CM

## 2020-06-09 PROBLEM — E83.52 HYPERCALCEMIA: Status: RESOLVED | Noted: 2019-06-19 | Resolved: 2020-06-09

## 2020-06-09 PROCEDURE — 99214 OFFICE O/P EST MOD 30 MIN: CPT | Performed by: FAMILY MEDICINE

## 2020-06-09 RX ORDER — LISINOPRIL 40 MG/1
20 TABLET ORAL DAILY
COMMUNITY
Start: 2020-06-09 | End: 2021-03-25

## 2020-06-09 RX ORDER — FUROSEMIDE 20 MG/1
20 TABLET ORAL DAILY
Qty: 30 TAB | Refills: 0 | Status: SHIPPED | OUTPATIENT
Start: 2020-06-09 | End: 2020-07-17 | Stop reason: SDUPTHER

## 2020-06-09 ASSESSMENT — FIBROSIS 4 INDEX: FIB4 SCORE: 1.63

## 2020-06-09 NOTE — PROGRESS NOTES
"cc: Shortness of breath    Subjective:     Veneracion Reyes Villagracia is a 88 y.o. female presenting with her daughter to discuss shortness of breath.  She has noted shortness of breath with exertion since January.  Has been stable.  She had an episode of lightheadedness, blurry vision last week, went to the emergency department.  They gave her IV fluids.  She denies any fevers, cough, weight loss, appetite loss, chest pain, leg swelling, orthopnea.    She also complains of numbness and tingling in the tips of her right fingers.  Is intermittent.  Has been going on for the past 3 months.  Denies any redness, swelling, injuries.  She occasionally has left neck pain.  Has tried nothing for this.    She has also been having difficulty sleeping, is taking melatonin 10 mg.    Hearing has also been diminished, would like a hearing evaluation      Review of systems:  See above.       Current Outpatient Medications:   •  lisinopril (PRINIVIL) 40 MG tablet, Take 0.5 Tabs by mouth every day., Disp: , Rfl:   •  furosemide (LASIX) 20 MG Tab, Take 1 Tab by mouth every day., Disp: 30 Tab, Rfl: 0  •  lovastatin (MEVACOR) 20 MG Tab, Take 1 Tab by mouth every day., Disp: 90 Tab, Rfl: 3  •  metoprolol SR (TOPROL XL) 25 MG TABLET SR 24 HR, Take 1 Tab by mouth every day., Disp: 90 Tab, Rfl: 3  •  omeprazole (PRILOSEC) 20 MG delayed-release capsule, Take 1 Cap by mouth every day., Disp: 90 Cap, Rfl: 3    Allergies, past medical history, past surgical history, family history, social history reviewed and updated    Objective:     Vitals: /78   Pulse 74   Temp 37 °C (98.6 °F)   Resp 16   Ht 1.499 m (4' 11\")   Wt 72.6 kg (160 lb)   SpO2 96%   BMI 32.32 kg/m²   General: Alert, pleasant, NAD  HEENT: Normocephalic.  EOMI, no icterus or pallor.  Conjunctivae and lids normal. External ears normal.  Tympanic membranes pearly, opaque bilaterally.  Heart: Regular rate and rhythm.  S1 and S2 normal.  No murmurs " appreciated.  Respiratory: Normal respiratory effort.  Clear to auscultation bilaterally.  Abdomen: Non-distended, soft  Skin: Warm, dry, no rashes.  Musculoskeletal: Gait is normal.  Moves all extremities well.  Extremities: No leg edema.  Psych:  Affect/mood is normal, judgement is good, memory is intact, grooming is appropriate.    Assessment/Plan:     Roberto was seen today for follow-up.    Diagnoses and all orders for this visit:    Shortness of breath on exertion  New problem.  We reviewed her records from [facility].  Her chest x-ray showed mild pulmonary vascular congestion.  We will check an echocardiogram, start Lasix 20 mg daily and check a BMP 3 to 5 days later.  We will also decrease her lisinopril to 20 mg daily and stop her amlodipine.  Follow-up in a week  -     EC-ECHOCARDIOGRAM COMPLETE W/O CONT; Future  -     Basic Metabolic Panel; Future  -     furosemide (LASIX) 20 MG Tab; Take 1 Tab by mouth every day.    Essential hypertension  Stable, but will decrease lisinopril to 20 mg and stop the amlodipine as we will be starting Lasix.  -     EC-ECHOCARDIOGRAM COMPLETE W/O CONT; Future  -     Basic Metabolic Panel; Future    Paresthesias  Neck pain  New problem, will check a B12 level and neck x-rays.  We discussed possibly doing physical therapy in the future.  There is no urgency for this.  -     VITAMIN B12; Future  -     DX-CERVICAL SPINE-2 OR 3 VIEWS; Future    Hearing loss, unspecified hearing loss type, unspecified laterality  New problem, referral placed  -     REFERRAL TO AUDIOLOGY        Return in about 1 week (around 6/16/2020) for Med check.

## 2020-06-17 ENCOUNTER — HOSPITAL ENCOUNTER (OUTPATIENT)
Dept: RADIOLOGY | Facility: MEDICAL CENTER | Age: 85
End: 2020-06-17
Attending: FAMILY MEDICINE
Payer: MEDICARE

## 2020-06-17 ENCOUNTER — HOSPITAL ENCOUNTER (OUTPATIENT)
Dept: LAB | Facility: MEDICAL CENTER | Age: 85
End: 2020-06-17
Attending: FAMILY MEDICINE
Payer: MEDICARE

## 2020-06-17 ENCOUNTER — HOSPITAL ENCOUNTER (OUTPATIENT)
Dept: CARDIOLOGY | Facility: MEDICAL CENTER | Age: 85
End: 2020-06-17
Attending: FAMILY MEDICINE
Payer: MEDICARE

## 2020-06-17 DIAGNOSIS — I10 ESSENTIAL HYPERTENSION: ICD-10-CM

## 2020-06-17 DIAGNOSIS — M54.2 NECK PAIN: ICD-10-CM

## 2020-06-17 DIAGNOSIS — R06.02 SHORTNESS OF BREATH ON EXERTION: ICD-10-CM

## 2020-06-17 DIAGNOSIS — R20.2 PARESTHESIAS: ICD-10-CM

## 2020-06-17 LAB
ANION GAP SERPL CALC-SCNC: 16 MMOL/L (ref 7–16)
BUN SERPL-MCNC: 16 MG/DL (ref 8–22)
CALCIUM SERPL-MCNC: 10.3 MG/DL (ref 8.5–10.5)
CHLORIDE SERPL-SCNC: 101 MMOL/L (ref 96–112)
CO2 SERPL-SCNC: 22 MMOL/L (ref 20–33)
CREAT SERPL-MCNC: 0.81 MG/DL (ref 0.5–1.4)
GLUCOSE SERPL-MCNC: 109 MG/DL (ref 65–99)
LV EJECT FRACT  99904: 65
LV EJECT FRACT MOD 2C 99903: 70.66
LV EJECT FRACT MOD 4C 99902: 69.98
LV EJECT FRACT MOD BP 99901: 70.85
POTASSIUM SERPL-SCNC: 4.7 MMOL/L (ref 3.6–5.5)
SODIUM SERPL-SCNC: 139 MMOL/L (ref 135–145)
VIT B12 SERPL-MCNC: 1275 PG/ML (ref 211–911)

## 2020-06-17 PROCEDURE — 82607 VITAMIN B-12: CPT

## 2020-06-17 PROCEDURE — 80048 BASIC METABOLIC PNL TOTAL CA: CPT

## 2020-06-17 PROCEDURE — 36415 COLL VENOUS BLD VENIPUNCTURE: CPT

## 2020-06-17 PROCEDURE — 93306 TTE W/DOPPLER COMPLETE: CPT | Mod: 26 | Performed by: INTERNAL MEDICINE

## 2020-06-17 PROCEDURE — 93306 TTE W/DOPPLER COMPLETE: CPT

## 2020-06-17 PROCEDURE — 72040 X-RAY EXAM NECK SPINE 2-3 VW: CPT

## 2020-06-23 ENCOUNTER — OFFICE VISIT (OUTPATIENT)
Dept: MEDICAL GROUP | Facility: MEDICAL CENTER | Age: 85
End: 2020-06-23
Payer: MEDICARE

## 2020-06-23 ENCOUNTER — HOSPITAL ENCOUNTER (OUTPATIENT)
Dept: LAB | Facility: MEDICAL CENTER | Age: 85
End: 2020-06-23
Attending: FAMILY MEDICINE
Payer: MEDICARE

## 2020-06-23 VITALS
TEMPERATURE: 97.6 F | OXYGEN SATURATION: 96 % | BODY MASS INDEX: 31.85 KG/M2 | SYSTOLIC BLOOD PRESSURE: 132 MMHG | DIASTOLIC BLOOD PRESSURE: 88 MMHG | HEART RATE: 74 BPM | HEIGHT: 59 IN | RESPIRATION RATE: 16 BRPM | WEIGHT: 158 LBS

## 2020-06-23 DIAGNOSIS — I10 ESSENTIAL HYPERTENSION: ICD-10-CM

## 2020-06-23 DIAGNOSIS — I51.89 DIASTOLIC DYSFUNCTION: ICD-10-CM

## 2020-06-23 DIAGNOSIS — M47.812 OSTEOARTHRITIS OF CERVICAL SPINE, UNSPECIFIED SPINAL OSTEOARTHRITIS COMPLICATION STATUS: ICD-10-CM

## 2020-06-23 LAB
ANION GAP SERPL CALC-SCNC: 13 MMOL/L (ref 7–16)
BUN SERPL-MCNC: 24 MG/DL (ref 8–22)
CALCIUM SERPL-MCNC: 10.2 MG/DL (ref 8.5–10.5)
CHLORIDE SERPL-SCNC: 101 MMOL/L (ref 96–112)
CO2 SERPL-SCNC: 23 MMOL/L (ref 20–33)
CREAT SERPL-MCNC: 0.88 MG/DL (ref 0.5–1.4)
FASTING STATUS PATIENT QL REPORTED: NORMAL
GLUCOSE SERPL-MCNC: 105 MG/DL (ref 65–99)
POTASSIUM SERPL-SCNC: 4.7 MMOL/L (ref 3.6–5.5)
SODIUM SERPL-SCNC: 137 MMOL/L (ref 135–145)

## 2020-06-23 PROCEDURE — 80048 BASIC METABOLIC PNL TOTAL CA: CPT

## 2020-06-23 PROCEDURE — 36415 COLL VENOUS BLD VENIPUNCTURE: CPT

## 2020-06-23 PROCEDURE — 99214 OFFICE O/P EST MOD 30 MIN: CPT | Performed by: FAMILY MEDICINE

## 2020-06-23 ASSESSMENT — FIBROSIS 4 INDEX: FIB4 SCORE: 1.63

## 2020-06-23 ASSESSMENT — PATIENT HEALTH QUESTIONNAIRE - PHQ9: CLINICAL INTERPRETATION OF PHQ2 SCORE: 0

## 2020-06-23 NOTE — PROGRESS NOTES
"cc: Shortness of breath    Subjective:     Veneracion Reyes Villagracia is a 88 y.o. female presenting with her daughter for follow-up of shortness of breath.  Since her last visit, she reports that her breathing has improved significantly.  She also continues to deny any chest pain, leg swelling, fevers, cough.  She also reports that her intermittent lightheadedness have resolved.  She does note a dry cough when she takes the lisinopril.  She also reports sleeping better with the reduced dose of melatonin.  She continues on the Lasix 20 mg daily, furosemide 20 mg daily, metoprolol 25 mg.  She stopped the amlodipine.  The lab work that she did recently was before she started her furosemide.      Review of systems:  See above.       Current Outpatient Medications:   •  lisinopril (PRINIVIL) 40 MG tablet, Take 0.5 Tabs by mouth every day., Disp: , Rfl:   •  furosemide (LASIX) 20 MG Tab, Take 1 Tab by mouth every day., Disp: 30 Tab, Rfl: 0  •  lovastatin (MEVACOR) 20 MG Tab, Take 1 Tab by mouth every day., Disp: 90 Tab, Rfl: 3  •  metoprolol SR (TOPROL XL) 25 MG TABLET SR 24 HR, Take 1 Tab by mouth every day., Disp: 90 Tab, Rfl: 3  •  omeprazole (PRILOSEC) 20 MG delayed-release capsule, Take 1 Cap by mouth every day., Disp: 90 Cap, Rfl: 3    Allergies, past medical history, past surgical history, family history, social history reviewed and updated    Objective:     Vitals: /88   Pulse 74   Temp 36.4 °C (97.6 °F)   Resp 16   Ht 1.499 m (4' 11\")   Wt 71.7 kg (158 lb)   SpO2 96%   BMI 31.91 kg/m²   General: Alert, pleasant, NAD  HEENT: Normocephalic.    Heart: Regular rate and rhythm.  S1 and S2 normal.  No murmurs appreciated.  Respiratory: Normal respiratory effort.  Clear to auscultation bilaterally.  Abdomen: Non-distended, soft  Extremities: No leg edema.  Psych:  Affect/mood is normal, judgement is good, memory is intact, grooming is appropriate.    Assessment/Plan:     Roberto was seen today for " follow-up.    Diagnoses and all orders for this visit:    Diastolic dysfunction  New problem.  We reviewed her echocardiogram, suspect that she has some component of diastolic dysfunction causing her symptoms.  We will continue with the furosemide 20 mg daily.  We will recheck a BMP.    Essential hypertension  Stable, will continue with the lisinopril 20 mg daily, metoprolol, furosemide.  Will remain off the amlodipine.  Follow-up in 3 months  -     Basic Metabolic Panel; Future    Osteoarthritis of cervical spine, unspecified spinal osteoarthritis complication status  New problem, we reviewed her x-rays, she reports that her numbness and tingling in her fingertips have been stable and is manageable.  She is not interested in an MRI, injections, surgery at this time.        Return in about 3 months (around 9/23/2020) for routine follow up.

## 2020-07-17 ENCOUNTER — TELEPHONE (OUTPATIENT)
Dept: MEDICAL GROUP | Facility: MEDICAL CENTER | Age: 85
End: 2020-07-17

## 2020-07-17 RX ORDER — FUROSEMIDE 20 MG/1
20 TABLET ORAL DAILY
Qty: 90 TAB | Refills: 1 | Status: SHIPPED | OUTPATIENT
Start: 2020-07-17 | End: 2020-11-18

## 2020-07-17 RX ORDER — FUROSEMIDE 20 MG/1
20 TABLET ORAL DAILY
Qty: 90 TAB | Refills: 1 | Status: SHIPPED | OUTPATIENT
Start: 2020-07-17 | End: 2020-07-17 | Stop reason: SDUPTHER

## 2020-11-18 RX ORDER — FUROSEMIDE 20 MG/1
20 TABLET ORAL DAILY
Qty: 90 TAB | Refills: 1 | Status: SHIPPED | OUTPATIENT
Start: 2020-11-18 | End: 2021-05-10

## 2021-01-11 DIAGNOSIS — Z23 NEED FOR VACCINATION: ICD-10-CM

## 2021-02-05 DIAGNOSIS — I49.3 PVC (PREMATURE VENTRICULAR CONTRACTION): ICD-10-CM

## 2021-02-05 RX ORDER — METOPROLOL SUCCINATE 25 MG/1
25 TABLET, EXTENDED RELEASE ORAL DAILY
Qty: 90 TAB | Refills: 1 | Status: SHIPPED | OUTPATIENT
Start: 2021-02-05 | End: 2021-08-05

## 2021-03-11 RX ORDER — OMEPRAZOLE 20 MG/1
20 CAPSULE, DELAYED RELEASE ORAL DAILY
Qty: 90 CAPSULE | Refills: 3 | Status: SHIPPED | OUTPATIENT
Start: 2021-03-11 | End: 2022-08-16

## 2021-03-25 RX ORDER — LISINOPRIL 40 MG/1
40 TABLET ORAL DAILY
Qty: 90 TABLET | Refills: 2 | Status: SHIPPED | OUTPATIENT
Start: 2021-03-25 | End: 2021-10-06 | Stop reason: SDUPTHER

## 2021-03-25 NOTE — TELEPHONE ENCOUNTER
Was the patient seen in the last year in this department? Yes     Does patient have an active prescription for medications requested? No     Received Request Via: Pharmacy

## 2021-03-31 ENCOUNTER — IMMUNIZATION (OUTPATIENT)
Dept: FAMILY PLANNING/WOMEN'S HEALTH CLINIC | Facility: IMMUNIZATION CENTER | Age: 86
End: 2021-03-31
Attending: INTERNAL MEDICINE
Payer: MEDICARE

## 2021-03-31 DIAGNOSIS — Z23 ENCOUNTER FOR VACCINATION: Primary | ICD-10-CM

## 2021-03-31 DIAGNOSIS — Z23 NEED FOR VACCINATION: ICD-10-CM

## 2021-03-31 PROCEDURE — 91300 PFIZER SARS-COV-2 VACCINE: CPT

## 2021-03-31 PROCEDURE — 0001A PFIZER SARS-COV-2 VACCINE: CPT

## 2021-04-23 ENCOUNTER — IMMUNIZATION (OUTPATIENT)
Dept: FAMILY PLANNING/WOMEN'S HEALTH CLINIC | Facility: IMMUNIZATION CENTER | Age: 86
End: 2021-04-23
Attending: INTERNAL MEDICINE
Payer: MEDICARE

## 2021-04-23 DIAGNOSIS — Z23 ENCOUNTER FOR VACCINATION: Primary | ICD-10-CM

## 2021-04-26 PROCEDURE — 91300 PFIZER SARS-COV-2 VACCINE: CPT | Performed by: INTERNAL MEDICINE

## 2021-04-26 PROCEDURE — 0002A PFIZER SARS-COV-2 VACCINE: CPT | Performed by: INTERNAL MEDICINE

## 2021-05-10 RX ORDER — FUROSEMIDE 20 MG/1
20 TABLET ORAL DAILY
Qty: 90 TABLET | Refills: 0 | Status: SHIPPED | OUTPATIENT
Start: 2021-05-10 | End: 2021-08-02

## 2021-06-14 RX ORDER — AMLODIPINE BESYLATE 5 MG/1
5 TABLET ORAL DAILY
Qty: 90 TABLET | Refills: 0 | Status: SHIPPED | OUTPATIENT
Start: 2021-06-14 | End: 2021-10-06 | Stop reason: SDUPTHER

## 2021-08-02 RX ORDER — FUROSEMIDE 20 MG/1
20 TABLET ORAL DAILY
Qty: 90 TABLET | Refills: 0 | Status: SHIPPED | OUTPATIENT
Start: 2021-08-02 | End: 2021-08-09

## 2021-08-03 ENCOUNTER — PATIENT MESSAGE (OUTPATIENT)
Dept: HEALTH INFORMATION MANAGEMENT | Facility: OTHER | Age: 86
End: 2021-08-03

## 2021-08-05 DIAGNOSIS — I49.3 PVC (PREMATURE VENTRICULAR CONTRACTION): ICD-10-CM

## 2021-08-05 RX ORDER — METOPROLOL SUCCINATE 25 MG/1
25 TABLET, EXTENDED RELEASE ORAL
Qty: 30 TABLET | Refills: 0 | Status: SHIPPED | OUTPATIENT
Start: 2021-08-05 | End: 2021-09-08 | Stop reason: SDUPTHER

## 2021-08-09 RX ORDER — FUROSEMIDE 20 MG/1
20 TABLET ORAL DAILY
Qty: 90 TABLET | Refills: 0 | Status: SHIPPED | OUTPATIENT
Start: 2021-08-09 | End: 2021-10-06 | Stop reason: SDUPTHER

## 2021-09-08 DIAGNOSIS — I49.3 PVC (PREMATURE VENTRICULAR CONTRACTION): ICD-10-CM

## 2021-09-08 RX ORDER — METOPROLOL SUCCINATE 25 MG/1
25 TABLET, EXTENDED RELEASE ORAL
Qty: 30 TABLET | Refills: 0 | Status: SHIPPED | OUTPATIENT
Start: 2021-09-08 | End: 2021-10-06 | Stop reason: SDUPTHER

## 2021-09-08 NOTE — TELEPHONE ENCOUNTER
Received request via: Patient    Was the patient seen in the last year in this department? No     Does the patient have an active prescription (recently filled or refills available) for medication(s) requested? No     Requested Prescriptions     Pending Prescriptions Disp Refills   • metoprolol SR (TOPROL XL) 25 MG TABLET SR 24 HR 30 Tablet 0     Sig: Take 1 Tablet by mouth every day.

## 2021-10-06 DIAGNOSIS — I49.3 PVC (PREMATURE VENTRICULAR CONTRACTION): ICD-10-CM

## 2021-10-06 RX ORDER — AMLODIPINE BESYLATE 5 MG/1
5 TABLET ORAL DAILY
Qty: 90 TABLET | Refills: 0 | Status: SHIPPED | OUTPATIENT
Start: 2021-10-06 | End: 2022-08-16 | Stop reason: SDUPTHER

## 2021-10-06 RX ORDER — LOVASTATIN 20 MG/1
20 TABLET ORAL DAILY
Qty: 90 TABLET | Refills: 3 | Status: SHIPPED | OUTPATIENT
Start: 2021-10-06 | End: 2022-08-16 | Stop reason: SDUPTHER

## 2021-10-06 RX ORDER — FUROSEMIDE 20 MG/1
20 TABLET ORAL DAILY
Qty: 90 TABLET | Refills: 0 | Status: SHIPPED | OUTPATIENT
Start: 2021-10-06 | End: 2022-02-14

## 2021-10-06 RX ORDER — LISINOPRIL 40 MG/1
40 TABLET ORAL DAILY
Qty: 90 TABLET | Refills: 2 | Status: SHIPPED | OUTPATIENT
Start: 2021-10-06 | End: 2021-10-27

## 2021-10-06 RX ORDER — METOPROLOL SUCCINATE 25 MG/1
25 TABLET, EXTENDED RELEASE ORAL
Qty: 30 TABLET | Refills: 0 | Status: SHIPPED | OUTPATIENT
Start: 2021-10-06 | End: 2021-10-27 | Stop reason: SDUPTHER

## 2021-10-15 ENCOUNTER — APPOINTMENT (OUTPATIENT)
Dept: MEDICAL GROUP | Facility: MEDICAL CENTER | Age: 86
End: 2021-10-15
Payer: MEDICARE

## 2021-10-27 ENCOUNTER — OFFICE VISIT (OUTPATIENT)
Dept: MEDICAL GROUP | Facility: MEDICAL CENTER | Age: 86
End: 2021-10-27
Payer: MEDICARE

## 2021-10-27 VITALS
DIASTOLIC BLOOD PRESSURE: 72 MMHG | BODY MASS INDEX: 31.25 KG/M2 | OXYGEN SATURATION: 95 % | SYSTOLIC BLOOD PRESSURE: 128 MMHG | WEIGHT: 155 LBS | HEART RATE: 74 BPM | RESPIRATION RATE: 16 BRPM | HEIGHT: 59 IN | TEMPERATURE: 97.6 F

## 2021-10-27 DIAGNOSIS — E78.5 DYSLIPIDEMIA: ICD-10-CM

## 2021-10-27 DIAGNOSIS — I10 ESSENTIAL HYPERTENSION: ICD-10-CM

## 2021-10-27 DIAGNOSIS — Z23 NEED FOR VACCINATION: ICD-10-CM

## 2021-10-27 DIAGNOSIS — M85.89 OSTEOPENIA OF MULTIPLE SITES: ICD-10-CM

## 2021-10-27 DIAGNOSIS — M17.0 PRIMARY OSTEOARTHRITIS OF BOTH KNEES: ICD-10-CM

## 2021-10-27 DIAGNOSIS — E66.9 OBESITY (BMI 30-39.9): ICD-10-CM

## 2021-10-27 DIAGNOSIS — E55.9 VITAMIN D DEFICIENCY: ICD-10-CM

## 2021-10-27 PROCEDURE — G0008 ADMIN INFLUENZA VIRUS VAC: HCPCS | Performed by: FAMILY MEDICINE

## 2021-10-27 PROCEDURE — 99214 OFFICE O/P EST MOD 30 MIN: CPT | Mod: 25 | Performed by: FAMILY MEDICINE

## 2021-10-27 PROCEDURE — 90662 IIV NO PRSV INCREASED AG IM: CPT | Performed by: FAMILY MEDICINE

## 2021-10-27 RX ORDER — LOSARTAN POTASSIUM 100 MG/1
100 TABLET ORAL DAILY
Qty: 90 TABLET | Refills: 0 | Status: SHIPPED | OUTPATIENT
Start: 2021-10-27 | End: 2022-01-18

## 2021-10-27 RX ORDER — METOPROLOL SUCCINATE 25 MG/1
25 TABLET, EXTENDED RELEASE ORAL
Qty: 90 TABLET | Refills: 0 | Status: SHIPPED | OUTPATIENT
Start: 2021-10-27 | End: 2022-02-02

## 2021-10-27 ASSESSMENT — PATIENT HEALTH QUESTIONNAIRE - PHQ9: CLINICAL INTERPRETATION OF PHQ2 SCORE: 0

## 2021-10-27 NOTE — PROGRESS NOTES
"  Subjective:     Veneracion Reyes Villagracia is a 89 y.o. female presenting with her daughter for a follow-up.  Blood pressures remained stable on her current medications.  However, she has noticed a dry cough, is wondering if it may be due to the lisinopril.  Is willing to change to losartan.  She continues on the lovastatin regularly for her cholesterol.  She takes omeprazole as needed for GERD.  She continues to have pain in her knees bilaterally, uses a wheelchair today to get to her visit.  She is interested in seeing orthopedics to see if an injection may help.  Her last x-ray in 2019 showed severe osteoarthritis.  We also reviewed her CODE STATUS, she remains full code.  Her daughter is her healthcare agent        Current Outpatient Medications:   •  losartan (COZAAR) 100 MG Tab, Take 1 Tablet by mouth every day., Disp: 90 Tablet, Rfl: 0  •  metoprolol SR (TOPROL XL) 25 MG TABLET SR 24 HR, Take 1 Tablet by mouth every day., Disp: 90 Tablet, Rfl: 0  •  amLODIPine (NORVASC) 5 MG Tab, Take 1 Tablet by mouth every day., Disp: 90 Tablet, Rfl: 0  •  furosemide (LASIX) 20 MG Tab, Take 1 Tablet by mouth every day., Disp: 90 Tablet, Rfl: 0  •  lovastatin (MEVACOR) 20 MG Tab, Take 1 Tablet by mouth every day., Disp: 90 Tablet, Rfl: 3  •  omeprazole (PRILOSEC) 20 MG delayed-release capsule, Take 1 capsule by mouth every day., Disp: 90 capsule, Rfl: 3    Objective:     Vitals: /72   Pulse 74   Temp 36.4 °C (97.6 °F)   Resp 16   Ht 1.499 m (4' 11\")   Wt 70.3 kg (155 lb)   SpO2 95%   BMI 31.31 kg/m²   General: Alert  HEENT: Normocephalic  Heart: Regular rate and rhythm.  S1 and S2 normal.  No murmurs appreciated.  Respiratory: Normal respiratory effort.  Clear to auscultation bilaterally.  Abdomen: Non-distended, soft  Extremities: No leg edema.    Assessment/Plan:     Roberto was seen today for follow-up.    Diagnoses and all orders for this visit:    Essential hypertension  Chronic, stable.  We will " stop the lisinopril and start losartan given the dry cough.  We will check a BMP 1 week later.  Follow-up in 6 months  -     Basic Metabolic Panel; Future  -     losartan (COZAAR) 100 MG Tab; Take 1 Tablet by mouth every day.  -     metoprolol SR (TOPROL XL) 25 MG TABLET SR 24 HR; Take 1 Tablet by mouth every day.    Dyslipidemia  Chronic, stable, continue lovastatin  -     Basic Metabolic Panel; Future    Osteopenia of multiple sites  Chronic, stable, continue supplementation  -     VITAMIN D,25 HYDROXY; Future  -     Basic Metabolic Panel; Future    Vitamin D deficiency  Chronic, stable, continue supplementation  -     VITAMIN D,25 HYDROXY; Future  -     Basic Metabolic Panel; Future    Primary osteoarthritis of both knees  Chronic, worsening, referral to Ortho placed  -     REFERRAL TO ORTHOPEDICS    Need for vaccination  -     Influenza Vaccine, High Dose (65+ Only)    Obesity (BMI 30-39.9)  -     Patient identified as having weight management issue.  Appropriate orders and counseling given.          Return in about 6 months (around 4/27/2022) for routine follow up.

## 2021-12-23 ENCOUNTER — HOSPITAL ENCOUNTER (OUTPATIENT)
Dept: LAB | Facility: MEDICAL CENTER | Age: 86
End: 2021-12-23
Attending: FAMILY MEDICINE
Payer: MEDICARE

## 2021-12-23 DIAGNOSIS — E78.5 DYSLIPIDEMIA: ICD-10-CM

## 2021-12-23 DIAGNOSIS — E55.9 VITAMIN D DEFICIENCY: ICD-10-CM

## 2021-12-23 DIAGNOSIS — I10 ESSENTIAL HYPERTENSION: ICD-10-CM

## 2021-12-23 DIAGNOSIS — M85.89 OSTEOPENIA OF MULTIPLE SITES: ICD-10-CM

## 2021-12-23 LAB
25(OH)D3 SERPL-MCNC: 45 NG/ML (ref 30–100)
ANION GAP SERPL CALC-SCNC: 12 MMOL/L (ref 7–16)
BUN SERPL-MCNC: 14 MG/DL (ref 8–22)
CALCIUM SERPL-MCNC: 10.1 MG/DL (ref 8.5–10.5)
CHLORIDE SERPL-SCNC: 105 MMOL/L (ref 96–112)
CO2 SERPL-SCNC: 23 MMOL/L (ref 20–33)
CREAT SERPL-MCNC: 0.76 MG/DL (ref 0.5–1.4)
GLUCOSE SERPL-MCNC: 97 MG/DL (ref 65–99)
POTASSIUM SERPL-SCNC: 4.2 MMOL/L (ref 3.6–5.5)
SODIUM SERPL-SCNC: 140 MMOL/L (ref 135–145)

## 2021-12-23 PROCEDURE — 80048 BASIC METABOLIC PNL TOTAL CA: CPT

## 2021-12-23 PROCEDURE — 82306 VITAMIN D 25 HYDROXY: CPT

## 2021-12-23 PROCEDURE — 36415 COLL VENOUS BLD VENIPUNCTURE: CPT

## 2022-01-18 DIAGNOSIS — I10 ESSENTIAL HYPERTENSION: ICD-10-CM

## 2022-01-18 RX ORDER — LOSARTAN POTASSIUM 100 MG/1
100 TABLET ORAL DAILY
Qty: 90 TABLET | Refills: 1 | Status: SHIPPED | OUTPATIENT
Start: 2022-01-18 | End: 2022-07-20

## 2022-02-02 DIAGNOSIS — I10 ESSENTIAL HYPERTENSION: ICD-10-CM

## 2022-02-02 RX ORDER — METOPROLOL SUCCINATE 25 MG/1
25 TABLET, EXTENDED RELEASE ORAL
Qty: 90 TABLET | Refills: 3 | Status: SHIPPED | OUTPATIENT
Start: 2022-02-02 | End: 2022-08-16 | Stop reason: SDUPTHER

## 2022-02-14 RX ORDER — FUROSEMIDE 20 MG/1
20 TABLET ORAL DAILY
Qty: 90 TABLET | Refills: 1 | Status: SHIPPED | OUTPATIENT
Start: 2022-02-14 | End: 2022-05-27 | Stop reason: SDUPTHER

## 2022-05-27 RX ORDER — FUROSEMIDE 20 MG/1
20 TABLET ORAL DAILY
Qty: 90 TABLET | Refills: 1 | Status: SHIPPED | OUTPATIENT
Start: 2022-05-27 | End: 2022-08-16 | Stop reason: SDUPTHER

## 2022-06-22 ENCOUNTER — PATIENT MESSAGE (OUTPATIENT)
Dept: MEDICAL GROUP | Facility: MEDICAL CENTER | Age: 87
End: 2022-06-22
Payer: MEDICARE

## 2022-06-22 DIAGNOSIS — E78.5 DYSLIPIDEMIA: ICD-10-CM

## 2022-06-22 DIAGNOSIS — K21.9 GASTROESOPHAGEAL REFLUX DISEASE, UNSPECIFIED WHETHER ESOPHAGITIS PRESENT: ICD-10-CM

## 2022-06-22 DIAGNOSIS — I10 ESSENTIAL HYPERTENSION: ICD-10-CM

## 2022-06-22 DIAGNOSIS — E55.9 VITAMIN D DEFICIENCY: ICD-10-CM

## 2022-07-20 DIAGNOSIS — I10 ESSENTIAL HYPERTENSION: ICD-10-CM

## 2022-07-20 RX ORDER — LOSARTAN POTASSIUM 100 MG/1
100 TABLET ORAL DAILY
Qty: 90 TABLET | Refills: 0 | Status: SHIPPED | OUTPATIENT
Start: 2022-07-20 | End: 2022-08-08

## 2022-08-05 DIAGNOSIS — I10 ESSENTIAL HYPERTENSION: ICD-10-CM

## 2022-08-08 ENCOUNTER — HOSPITAL ENCOUNTER (OUTPATIENT)
Dept: LAB | Facility: MEDICAL CENTER | Age: 87
End: 2022-08-08
Attending: FAMILY MEDICINE
Payer: MEDICARE

## 2022-08-08 DIAGNOSIS — E78.5 DYSLIPIDEMIA: ICD-10-CM

## 2022-08-08 DIAGNOSIS — K21.9 GASTROESOPHAGEAL REFLUX DISEASE, UNSPECIFIED WHETHER ESOPHAGITIS PRESENT: ICD-10-CM

## 2022-08-08 DIAGNOSIS — I10 ESSENTIAL HYPERTENSION: ICD-10-CM

## 2022-08-08 DIAGNOSIS — E55.9 VITAMIN D DEFICIENCY: ICD-10-CM

## 2022-08-08 LAB
25(OH)D3 SERPL-MCNC: 46 NG/ML (ref 30–100)
ALBUMIN SERPL BCP-MCNC: 4.2 G/DL (ref 3.2–4.9)
ALBUMIN/GLOB SERPL: 1.3 G/DL
ALP SERPL-CCNC: 91 U/L (ref 30–99)
ALT SERPL-CCNC: 23 U/L (ref 2–50)
ANION GAP SERPL CALC-SCNC: 9 MMOL/L (ref 7–16)
AST SERPL-CCNC: 28 U/L (ref 12–45)
BILIRUB SERPL-MCNC: 0.6 MG/DL (ref 0.1–1.5)
BUN SERPL-MCNC: 17 MG/DL (ref 8–22)
CALCIUM SERPL-MCNC: 9.9 MG/DL (ref 8.5–10.5)
CHLORIDE SERPL-SCNC: 106 MMOL/L (ref 96–112)
CHOLEST SERPL-MCNC: 211 MG/DL (ref 100–199)
CO2 SERPL-SCNC: 24 MMOL/L (ref 20–33)
CREAT SERPL-MCNC: 0.79 MG/DL (ref 0.5–1.4)
ERYTHROCYTE [DISTWIDTH] IN BLOOD BY AUTOMATED COUNT: 48.5 FL (ref 35.9–50)
FASTING STATUS PATIENT QL REPORTED: NORMAL
GFR SERPLBLD CREATININE-BSD FMLA CKD-EPI: 71 ML/MIN/1.73 M 2
GLOBULIN SER CALC-MCNC: 3.3 G/DL (ref 1.9–3.5)
GLUCOSE SERPL-MCNC: 104 MG/DL (ref 65–99)
HCT VFR BLD AUTO: 40.2 % (ref 37–47)
HDLC SERPL-MCNC: 49 MG/DL
HGB BLD-MCNC: 13.2 G/DL (ref 12–16)
LDLC SERPL CALC-MCNC: 104 MG/DL
MCH RBC QN AUTO: 33.7 PG (ref 27–33)
MCHC RBC AUTO-ENTMCNC: 32.8 G/DL (ref 33.6–35)
MCV RBC AUTO: 102.6 FL (ref 81.4–97.8)
PLATELET # BLD AUTO: 198 K/UL (ref 164–446)
PMV BLD AUTO: 10.7 FL (ref 9–12.9)
POTASSIUM SERPL-SCNC: 4.9 MMOL/L (ref 3.6–5.5)
PROT SERPL-MCNC: 7.5 G/DL (ref 6–8.2)
RBC # BLD AUTO: 3.92 M/UL (ref 4.2–5.4)
SODIUM SERPL-SCNC: 139 MMOL/L (ref 135–145)
TRIGL SERPL-MCNC: 288 MG/DL (ref 0–149)
WBC # BLD AUTO: 7.1 K/UL (ref 4.8–10.8)

## 2022-08-08 PROCEDURE — 80053 COMPREHEN METABOLIC PANEL: CPT

## 2022-08-08 PROCEDURE — 82306 VITAMIN D 25 HYDROXY: CPT

## 2022-08-08 PROCEDURE — 80061 LIPID PANEL: CPT

## 2022-08-08 PROCEDURE — 85027 COMPLETE CBC AUTOMATED: CPT

## 2022-08-08 PROCEDURE — 36415 COLL VENOUS BLD VENIPUNCTURE: CPT

## 2022-08-08 RX ORDER — LOSARTAN POTASSIUM 100 MG/1
100 TABLET ORAL DAILY
Qty: 90 TABLET | Refills: 0 | Status: SHIPPED | OUTPATIENT
Start: 2022-08-08 | End: 2022-08-16 | Stop reason: SDUPTHER

## 2022-08-16 ENCOUNTER — OFFICE VISIT (OUTPATIENT)
Dept: MEDICAL GROUP | Facility: MEDICAL CENTER | Age: 87
End: 2022-08-16
Payer: MEDICARE

## 2022-08-16 VITALS
BODY MASS INDEX: 29.23 KG/M2 | HEIGHT: 59 IN | HEART RATE: 70 BPM | WEIGHT: 145 LBS | DIASTOLIC BLOOD PRESSURE: 76 MMHG | SYSTOLIC BLOOD PRESSURE: 118 MMHG | OXYGEN SATURATION: 100 % | RESPIRATION RATE: 16 BRPM | TEMPERATURE: 98.6 F

## 2022-08-16 DIAGNOSIS — E78.5 DYSLIPIDEMIA: ICD-10-CM

## 2022-08-16 DIAGNOSIS — R05.9 COUGH: ICD-10-CM

## 2022-08-16 DIAGNOSIS — I10 ESSENTIAL HYPERTENSION: ICD-10-CM

## 2022-08-16 PROBLEM — E66.9 OBESITY (BMI 30-39.9): Status: RESOLVED | Noted: 2019-08-16 | Resolved: 2022-08-16

## 2022-08-16 PROCEDURE — 99214 OFFICE O/P EST MOD 30 MIN: CPT | Performed by: FAMILY MEDICINE

## 2022-08-16 RX ORDER — FUROSEMIDE 20 MG/1
20 TABLET ORAL DAILY
Qty: 90 TABLET | Refills: 3 | Status: ON HOLD | OUTPATIENT
Start: 2022-08-16 | End: 2023-03-19

## 2022-08-16 RX ORDER — METOPROLOL SUCCINATE 25 MG/1
25 TABLET, EXTENDED RELEASE ORAL
Qty: 90 TABLET | Refills: 3 | Status: ON HOLD | OUTPATIENT
Start: 2022-08-16 | End: 2023-03-19

## 2022-08-16 RX ORDER — LOVASTATIN 20 MG/1
20 TABLET ORAL DAILY
Qty: 90 TABLET | Refills: 3 | Status: SHIPPED | OUTPATIENT
Start: 2022-08-16 | End: 2023-06-23

## 2022-08-16 RX ORDER — LOSARTAN POTASSIUM 100 MG/1
100 TABLET ORAL DAILY
Qty: 90 TABLET | Refills: 3 | Status: ON HOLD | OUTPATIENT
Start: 2022-08-16 | End: 2023-03-19

## 2022-08-16 RX ORDER — AMLODIPINE BESYLATE 5 MG/1
5 TABLET ORAL DAILY
Qty: 90 TABLET | Refills: 3 | Status: ON HOLD | OUTPATIENT
Start: 2022-08-16 | End: 2023-03-19

## 2022-08-16 RX ORDER — FLUTICASONE PROPIONATE 50 MCG
1 SPRAY, SUSPENSION (ML) NASAL DAILY
Qty: 16 G | Refills: 1 | Status: SHIPPED | OUTPATIENT
Start: 2022-08-16 | End: 2022-10-09

## 2022-08-16 ASSESSMENT — PATIENT HEALTH QUESTIONNAIRE - PHQ9: CLINICAL INTERPRETATION OF PHQ2 SCORE: 0

## 2022-08-16 ASSESSMENT — FIBROSIS 4 INDEX: FIB4 SCORE: 2.65

## 2022-08-16 NOTE — PROGRESS NOTES
"  Subjective:     Veneracion Reyes Villagracia is a 89 y.o. female presenting with her daughter for a follow up          Current Outpatient Medications:     lovastatin (MEVACOR) 20 MG Tab, Take 1 Tablet by mouth every day., Disp: 90 Tablet, Rfl: 3    losartan (COZAAR) 100 MG Tab, Take 1 Tablet by mouth every day., Disp: 90 Tablet, Rfl: 3    furosemide (LASIX) 20 MG Tab, Take 1 Tablet by mouth every day., Disp: 90 Tablet, Rfl: 3    metoprolol SR (TOPROL XL) 25 MG TABLET SR 24 HR, Take 1 Tablet by mouth every day., Disp: 90 Tablet, Rfl: 3    amLODIPine (NORVASC) 5 MG Tab, Take 1 Tablet by mouth every day., Disp: 90 Tablet, Rfl: 3    fluticasone (FLONASE) 50 MCG/ACT nasal spray, Administer 1 Spray into affected nostril(S) every day., Disp: 16 g, Rfl: 1    Objective:     Vitals: /76   Pulse 70   Temp 37 °C (98.6 °F)   Resp 16   Ht 1.499 m (4' 11\")   Wt 65.8 kg (145 lb)   SpO2 100%   BMI 29.29 kg/m²   General: Alert  HEENT: Normocephalic.  Heart: Regular rate and rhythm.  S1 and S2 normal.  No murmurs appreciated.  Respiratory: Normal respiratory effort.  Clear to auscultation on the right, faint rales at the left base.  Abdomen: Non-distended, soft  Extremities: No leg edema.    Assessment/Plan:     Roberto was seen today for follow-up.    Diagnoses and all orders for this visit:    Essential hypertension  Chronic, stable, continue current medications  -     losartan (COZAAR) 100 MG Tab; Take 1 Tablet by mouth every day.  -     furosemide (LASIX) 20 MG Tab; Take 1 Tablet by mouth every day.  -     metoprolol SR (TOPROL XL) 25 MG TABLET SR 24 HR; Take 1 Tablet by mouth every day.  -     amLODIPine (NORVASC) 5 MG Tab; Take 1 Tablet by mouth every day.    Dyslipidemia  Chronic, uncontrolled.  She has not been taking her losartan.  New prescription has been sent.    Cough  Chronic, stable, has persisted since January after having COVID, associated with nasal drainage.  Left lower base with faint rales, will " check an x-ray.  We will try a nasal spray  -     DX-CHEST-2 VIEWS; Future  -     fluticasone (FLONASE) 50 MCG/ACT nasal spray; Administer 1 Spray into affected nostril(S) every day.          Return in about 6 months (around 2/16/2023) for routine follow up.

## 2022-09-13 ENCOUNTER — HOSPITAL ENCOUNTER (OUTPATIENT)
Dept: RADIOLOGY | Facility: MEDICAL CENTER | Age: 87
End: 2022-09-13
Attending: FAMILY MEDICINE
Payer: MEDICARE

## 2022-09-13 DIAGNOSIS — R05.9 COUGH: ICD-10-CM

## 2022-09-13 PROCEDURE — 71046 X-RAY EXAM CHEST 2 VIEWS: CPT

## 2022-10-09 RX ORDER — FLUTICASONE PROPIONATE 50 MCG
1 SPRAY, SUSPENSION (ML) NASAL DAILY
Qty: 16 ML | Refills: 11 | Status: SHIPPED | OUTPATIENT
Start: 2022-10-09 | End: 2023-03-17

## 2022-11-03 ENCOUNTER — PATIENT MESSAGE (OUTPATIENT)
Dept: HEALTH INFORMATION MANAGEMENT | Facility: OTHER | Age: 87
End: 2022-11-03

## 2023-02-25 ENCOUNTER — HOSPITAL ENCOUNTER (EMERGENCY)
Facility: MEDICAL CENTER | Age: 88
End: 2023-02-25
Attending: EMERGENCY MEDICINE
Payer: MEDICARE

## 2023-02-25 VITALS
OXYGEN SATURATION: 93 % | WEIGHT: 145.06 LBS | DIASTOLIC BLOOD PRESSURE: 79 MMHG | HEART RATE: 87 BPM | RESPIRATION RATE: 17 BRPM | SYSTOLIC BLOOD PRESSURE: 188 MMHG | BODY MASS INDEX: 29.24 KG/M2 | HEIGHT: 59 IN | TEMPERATURE: 98.7 F

## 2023-02-25 DIAGNOSIS — I10 HYPERTENSION, UNSPECIFIED TYPE: Primary | ICD-10-CM

## 2023-02-25 DIAGNOSIS — F41.9 ANXIETY: ICD-10-CM

## 2023-02-25 LAB — EKG IMPRESSION: NORMAL

## 2023-02-25 PROCEDURE — 99283 EMERGENCY DEPT VISIT LOW MDM: CPT

## 2023-02-25 PROCEDURE — 93005 ELECTROCARDIOGRAM TRACING: CPT | Performed by: EMERGENCY MEDICINE

## 2023-02-25 ASSESSMENT — FIBROSIS 4 INDEX: FIB4 SCORE: 2.65

## 2023-02-25 NOTE — ED PROVIDER NOTES
"ED Provider Note    Scribed for Akbar Chen by Baljit Rawls. 2/25/2023  3:47 AM    Primary care provider: Yogesh Torres M.D.  Means of arrival: Walk in  History obtained from: Daughter  History limited by: None    CHIEF COMPLAINT  Chief Complaint   Patient presents with    Hypertension     Pt brought in by family for HTN going on for x3 days. Pt has hx and does take meds. Systolic readings at home were in 180's and 160's.        EXTERNAL RECORDS REVIEWED  Outpatient Notes The patient was seen in clinic in August 2022 for hypertension. She is prescribed Losartan, Furosemide, Metoprolol, and Amlodipine.  and Other The patient was last seen in the ED in August 2019 for a UTI.    HPI/ROS  LIMITATION TO HISTORY   None  OUTSIDE HISTORIAN(S):  Family Daughter gave history    HPI  Veneracion Reyes Villagracia is a 89 y.o. female with a history of hypertension and hyperlipidemia, who presents to the Emergency Department for evaluation of high blood pressure onset 3 days ago. The patient's daughter states that her systolic blood pressures have been ranging from the 160-180s over the past 3 days despite daily compliance with Losartan, Furosemide, Metoprolol, and Amlodipine. Her daughter notes that she has been taking her blood pressure multiple times a day, sometimes in the middle of her night. The patient's family was ultimately prompted to visit the ED over concerns of her persistent elevated blood pressure. Associated symptoms include increased anxiety, headache and neck pain. She describes the neck pain as a \"pressure\" sensations. Her daughter denies any chest pain, shortness of breath, generalized weakness, or recent falls. No alleviating or exacerbating factors noted.     REVIEW OF SYSTEMS  As above, all other systems reviewed and are negative.   See HPI for further details.     PAST MEDICAL HISTORY   has a past medical history of Arthritis, Cataract, Dental disorder, GI bleed, Heart burn, High cholesterol, " "Hypercholesteremia, Hypertension, and Urinary tract infection (08/2019).  SURGICAL HISTORY   has a past surgical history that includes breast biopsy (Left, 6/14/2018); other; anterior and posterior repair (N/A, 9/3/2019); enterocele repair (N/A, 9/3/2019); bladder sling female (N/A, 9/3/2019); and vaginal suspension (N/A, 9/3/2019).  SOCIAL HISTORY  Social History     Tobacco Use    Smoking status: Never    Smokeless tobacco: Never   Vaping Use    Vaping Use: Never used   Substance Use Topics    Alcohol use: No    Drug use: No      Social History     Substance and Sexual Activity   Drug Use No     FAMILY HISTORY  Family History   Problem Relation Age of Onset    Stroke Father     Breast Cancer Sister     Alcohol abuse Brother      CURRENT MEDICATIONS  Home Medications       Reviewed by Blanca Ventura R.N. (Registered Nurse) on 02/25/23 at 0150  Med List Status: Partial     Medication Last Dose Status   amLODIPine (NORVASC) 5 MG Tab  Active   fluticasone (FLONASE) 50 MCG/ACT nasal spray  Active   furosemide (LASIX) 20 MG Tab  Active   losartan (COZAAR) 100 MG Tab  Active   lovastatin (MEVACOR) 20 MG Tab  Active   metoprolol SR (TOPROL XL) 25 MG TABLET SR 24 HR  Active                  ALLERGIES  Allergies   Allergen Reactions    Penicillins Rash     Difficulty breathing rash       PHYSICAL EXAM    VITAL SIGNS:   Vitals:    02/25/23 0141 02/25/23 0331 02/25/23 0332 02/25/23 0401   BP:  (!) 197/78  (!) 188/79   Pulse:   75 87   Resp:   16 17   Temp:    37.1 °C (98.7 °F)   TempSrc:    Temporal   SpO2:   91% 93%   Weight: 65.8 kg (145 lb 1 oz)      Height: 1.499 m (4' 11\")        Vitals: My interpretation: hypertensive, not tachycardic, afebrile, not hypoxic    Reinterpretation of vitals: Slightly improved otherwise unremarkable vital signs    Cardiac Monitor Interpretation: The cardiac monitor revealed normal Sinus Rhythm as interpreted by me. The cardiac monitor was ordered secondary to the patient's history of " hypertension and to monitor for dysrhythmia and/or tachycardia.    PE:   Constitutional: Well developed, Well nourished, No acute distress, Non-toxic appearance.   HENT: Normocephalic, Atraumatic, Bilateral external ears normal, Oropharynx is clear mucous membranes are moist. No oral exudates or nasal discharge.   Eyes: Pupils are equal round and reactive, EOMI, Conjunctiva normal, No discharge.   Neck: Normal range of motion, No tenderness, Supple, No stridor. No meningismus.  Lymphatic: No lymphadenopathy noted.   Cardiovascular: Regular rate and rhythm without murmur rub or gallop.  Thorax & Lungs: Clear breath sounds bilaterally without wheezes, rhonchi or rales. There is no chest wall tenderness.   Abdomen: Soft non-tender non-distended. There is no rebound or guarding. No organomegaly is appreciated. Bowel sounds are normal.  Skin: Normal without rash.   Back: No CVA or spinal tenderness.   Extremities: Intact distal pulses, No edema, No tenderness, No cyanosis, No clubbing. Capillary refill is less than 2 seconds.  Musculoskeletal: Good range of motion in all major joints. No tenderness to palpation or major deformities noted.   Neurologic: Alert & oriented x 3, Normal motor function, Normal sensory function, No focal deficits noted. Reflexes are normal.  Psychiatric: Affect normal, Judgment normal, Mood normal. There is no suicidal ideation or patient reported hallucinations.     DIAGNOSTIC STUDIES / PROCEDURES    LABS  Results for orders placed or performed during the hospital encounter of 23   EKG   Result Value Ref Range    Report       [facility] Emergency Dept.    Test Date:  2023  Pt Name:    VENERACION VILLAGRACIA       Department: ER  MRN:        [id number]                      Room:       [facility]  Gender:     Female                       Technician: [id number]  :        [id number]                   Requested By:SHANTE HARE  Order #:    [id number]                     Reading MD: Akbar Chen    Measurements  Intervals                                Axis  Rate:       88                           P:  NC:                                      QRS:        33  QRSD:       110                          T:          49  QT:         395  QTc:        478    Interpretive Statements  Sinus rhythm with frequent PVCs  Ventricular premature complex  Probable left ventricular hypertrophy  Compared to ECG 08/19/2019 13:23:07  Ventricular premature complex(es) now present  Ectopic atrial rhythm no longer present  First degree AV block no longer present  Electronically Signed On 2- 4:05:17 PST by  Akbar Chen        All labs reviewed by me. Labs were compared to prior labs if they were available. Significant for none    COURSE & MEDICAL DECISION MAKING  Nursing notes, VS, PMSFHx, labs, imaging, EKG reviewed in chart.    Ddx: Hypertension, anxiety, sleep disturbance    MDM: 3:47 AM Veneracion Reyes Villagracia is a 89 y.o. female who presented with acute on chronic hypertension with exacerbation.  Patient is compliant with her multiple hypertensive medications.  Daughter at bedside.  Patient lives with her other daughter and they are very close family.  Daughter provides most of the medical history which is helpful.  States that mother is extremely anxious about her blood pressure, checks it multiple times throughout the day, if it is elevated she becomes more anxious and keeps on checking it.  Her normal blood pressures around 140 systolic, tonight patient woke up took her blood pressure and it was extremely elevated around 1 60-1 80 and patient came into the emergency department.  She denies any other symptoms here other than some mild bilateral trapezius tenderness consistent with likely muscle strain.  She has a benign cardiac exam.  Her EKG is unremarkable.  She has no other complaints, I do not think she warrants further laboratory evaluation as she is asymptomatic  hypertension.  I spent 5 minutes explaining and discussing hypertension, hypertensive diary, checking blood pressure once a morning once the evening, follow-up with PCP and hypertensive emergencies with her and her daughter at bedside.  They verbalized understanding.  She is amatory, at baseline, daughter is comfortable taking her home at this time.  I discussed return precautions with them and they verbalized understanding and are amenable.    Patient has had high blood pressure while in the emergency department, felt likely secondary to medical condition. Counseled patient to monitor blood pressure at home and follow up with primary care physician.      ADDITIONAL PROBLEM LIST AND DISPOSITION    I have discussed management of the patient with the following physicians and WILFREDO's: None    Discussion of management with other QHP or appropriate source(s): None     Escalation of care considered, and ultimately not performed:acute inpatient care management, however at this time, the patient is most appropriate for outpatient management    Barriers to care at this time, including but not limited to:  None .     Decision tools and prescription drugs considered including, but not limited to:  None .    FINAL IMPRESSION  1. Hypertension, unspecified type Acute   2. Anxiety Acute      Baljit FRANKLIN (Yariel), am scribing for, and in the presence of, Akbar Chen.    Electronically signed by: Baljit Rawls (Yariel), 2/25/2023    IAkbar personally performed the services described in this documentation, as scribed by Baljit Rawls in my presence, and it is both accurate and complete.    The note accurately reflects work and decisions made by me.  Akbar Chen  2/25/2023  4:20 AM

## 2023-02-25 NOTE — ED TRIAGE NOTES
"Chief Complaint   Patient presents with    Hypertension     Pt brought in by family for HTN going on for x3 days. Pt has hx and does take meds. Systolic readings at home were in 180's and 160's.      BP (!) 155/80   Pulse 77   Temp 37.1 °C (98.7 °F) (Temporal)   Resp 18   Ht 1.499 m (4' 11\")   Wt 65.8 kg (145 lb 1 oz)   SpO2 93% Comment: Room air  BMI 29.30 kg/m²     Pt brought in by daughter for above cc  Pt does live w/ family  Pt did take her HTN meds last night but family states high blood pressure readings at home  Daughter unsure if she has cardiologist or not     Pt BP /80    Pt to wait in family room  "

## 2023-02-25 NOTE — ED NOTES
Pt educated on discharge instructions. All questions & concerns addressed. Vitals re-assessed and at pt's baseline. Pt brought to exit via wheelchair and all belongings.

## 2023-02-25 NOTE — DISCHARGE INSTRUCTIONS
Your blood pressure was elevated today.  However, I think part of it is because you are anxious.  I do not want to get up in the middle of the night and check your blood pressure.  I want to check it once in the morning after resting for 5 minutes and once in the evening after resting for 5 minutes.  Only take it once, no matter what the number is.  Write down a book and presented here PCP so they can evaluate and change her medications as needed.  If you do have concerns for chest pain or other symptoms, then of course please return to the emergency permit for further evaluation and treatment.  Thank you for coming in today.

## 2023-03-17 ENCOUNTER — HOSPITAL ENCOUNTER (INPATIENT)
Facility: MEDICAL CENTER | Age: 88
LOS: 2 days | DRG: 291 | End: 2023-03-19
Attending: EMERGENCY MEDICINE | Admitting: STUDENT IN AN ORGANIZED HEALTH CARE EDUCATION/TRAINING PROGRAM
Payer: MEDICARE

## 2023-03-17 ENCOUNTER — APPOINTMENT (OUTPATIENT)
Dept: RADIOLOGY | Facility: MEDICAL CENTER | Age: 88
DRG: 291 | End: 2023-03-17
Attending: EMERGENCY MEDICINE
Payer: MEDICARE

## 2023-03-17 DIAGNOSIS — I50.9 ACUTE CONGESTIVE HEART FAILURE, UNSPECIFIED HEART FAILURE TYPE (HCC): ICD-10-CM

## 2023-03-17 DIAGNOSIS — M47.812 OSTEOARTHRITIS OF CERVICAL SPINE, UNSPECIFIED SPINAL OSTEOARTHRITIS COMPLICATION STATUS: ICD-10-CM

## 2023-03-17 DIAGNOSIS — M25.562 CHRONIC PAIN OF BOTH KNEES: ICD-10-CM

## 2023-03-17 DIAGNOSIS — I48.91 ATRIAL FIBRILLATION, UNSPECIFIED TYPE (HCC): ICD-10-CM

## 2023-03-17 DIAGNOSIS — M25.561 CHRONIC PAIN OF BOTH KNEES: ICD-10-CM

## 2023-03-17 DIAGNOSIS — G89.29 CHRONIC PAIN OF BOTH KNEES: ICD-10-CM

## 2023-03-17 PROBLEM — R19.7 DIARRHEA: Status: ACTIVE | Noted: 2023-03-17

## 2023-03-17 PROBLEM — I16.0 HYPERTENSIVE URGENCY: Status: ACTIVE | Noted: 2018-08-14

## 2023-03-17 PROBLEM — J96.01 ACUTE RESPIRATORY FAILURE WITH HYPOXIA (HCC): Status: ACTIVE | Noted: 2023-03-17

## 2023-03-17 PROBLEM — J81.0 ACUTE PULMONARY EDEMA (HCC): Status: ACTIVE | Noted: 2023-03-17

## 2023-03-17 PROBLEM — R74.8 ELEVATED LIVER ENZYMES: Status: ACTIVE | Noted: 2023-03-17

## 2023-03-17 LAB
ALBUMIN SERPL BCP-MCNC: 4.2 G/DL (ref 3.2–4.9)
ALBUMIN/GLOB SERPL: 1.3 G/DL
ALP SERPL-CCNC: 122 U/L (ref 30–99)
ALT SERPL-CCNC: 79 U/L (ref 2–50)
ANION GAP SERPL CALC-SCNC: 13 MMOL/L (ref 7–16)
APPEARANCE UR: CLEAR
AST SERPL-CCNC: 67 U/L (ref 12–45)
BACTERIA #/AREA URNS HPF: NEGATIVE /HPF
BASOPHILS # BLD AUTO: 0.3 % (ref 0–1.8)
BASOPHILS # BLD: 0.03 K/UL (ref 0–0.12)
BILIRUB SERPL-MCNC: 0.6 MG/DL (ref 0.1–1.5)
BILIRUB UR QL STRIP.AUTO: NEGATIVE
BUN SERPL-MCNC: 22 MG/DL (ref 8–22)
CALCIUM ALBUM COR SERPL-MCNC: 9.3 MG/DL (ref 8.5–10.5)
CALCIUM SERPL-MCNC: 9.5 MG/DL (ref 8.5–10.5)
CHLORIDE SERPL-SCNC: 100 MMOL/L (ref 96–112)
CO2 SERPL-SCNC: 18 MMOL/L (ref 20–33)
COLOR UR: YELLOW
CREAT SERPL-MCNC: 0.7 MG/DL (ref 0.5–1.4)
EKG IMPRESSION: NORMAL
EOSINOPHIL # BLD AUTO: 0.11 K/UL (ref 0–0.51)
EOSINOPHIL NFR BLD: 1.3 % (ref 0–6.9)
EPI CELLS #/AREA URNS HPF: NEGATIVE /HPF
ERYTHROCYTE [DISTWIDTH] IN BLOOD BY AUTOMATED COUNT: 50.5 FL (ref 35.9–50)
FLUAV RNA SPEC QL NAA+PROBE: NEGATIVE
FLUBV RNA SPEC QL NAA+PROBE: NEGATIVE
GFR SERPLBLD CREATININE-BSD FMLA CKD-EPI: 82 ML/MIN/1.73 M 2
GLOBULIN SER CALC-MCNC: 3.3 G/DL (ref 1.9–3.5)
GLUCOSE SERPL-MCNC: 119 MG/DL (ref 65–99)
GLUCOSE UR STRIP.AUTO-MCNC: NEGATIVE MG/DL
HCT VFR BLD AUTO: 38.4 % (ref 37–47)
HEMOCCULT STL QL: NEGATIVE
HGB BLD-MCNC: 12.5 G/DL (ref 12–16)
HYALINE CASTS #/AREA URNS LPF: NORMAL /LPF
IMM GRANULOCYTES # BLD AUTO: 0.04 K/UL (ref 0–0.11)
IMM GRANULOCYTES NFR BLD AUTO: 0.5 % (ref 0–0.9)
KETONES UR STRIP.AUTO-MCNC: NEGATIVE MG/DL
LACTATE SERPL-SCNC: 1.3 MMOL/L (ref 0.5–2)
LACTOFERRIN STL QL IA: NEGATIVE
LEUKOCYTE ESTERASE UR QL STRIP.AUTO: ABNORMAL
LIPASE SERPL-CCNC: 77 U/L (ref 11–82)
LYMPHOCYTES # BLD AUTO: 2.32 K/UL (ref 1–4.8)
LYMPHOCYTES NFR BLD: 26.6 % (ref 22–41)
MAGNESIUM SERPL-MCNC: 1.9 MG/DL (ref 1.5–2.5)
MAGNESIUM SERPL-MCNC: 1.9 MG/DL (ref 1.5–2.5)
MCH RBC QN AUTO: 33.1 PG (ref 27–33)
MCHC RBC AUTO-ENTMCNC: 32.6 G/DL (ref 33.6–35)
MCV RBC AUTO: 101.6 FL (ref 81.4–97.8)
MICRO URNS: ABNORMAL
MONOCYTES # BLD AUTO: 0.92 K/UL (ref 0–0.85)
MONOCYTES NFR BLD AUTO: 10.6 % (ref 0–13.4)
NEUTROPHILS # BLD AUTO: 5.3 K/UL (ref 2–7.15)
NEUTROPHILS NFR BLD: 60.7 % (ref 44–72)
NITRITE UR QL STRIP.AUTO: NEGATIVE
NRBC # BLD AUTO: 0 K/UL
NRBC BLD-RTO: 0 /100 WBC
NT-PROBNP SERPL IA-MCNC: 3067 PG/ML (ref 0–125)
PH UR STRIP.AUTO: 5.5 [PH] (ref 5–8)
PLATELET # BLD AUTO: 200 K/UL (ref 164–446)
PMV BLD AUTO: 10.5 FL (ref 9–12.9)
POTASSIUM SERPL-SCNC: 4.4 MMOL/L (ref 3.6–5.5)
PROCALCITONIN SERPL-MCNC: 0.09 NG/ML
PROT SERPL-MCNC: 7.5 G/DL (ref 6–8.2)
PROT UR QL STRIP: NEGATIVE MG/DL
RBC # BLD AUTO: 3.78 M/UL (ref 4.2–5.4)
RBC # URNS HPF: NORMAL /HPF
RBC UR QL AUTO: NEGATIVE
RSV RNA SPEC QL NAA+PROBE: NEGATIVE
SARS-COV-2 RNA RESP QL NAA+PROBE: NOTDETECTED
SODIUM SERPL-SCNC: 131 MMOL/L (ref 135–145)
SP GR UR STRIP.AUTO: 1.01
SPECIMEN SOURCE: NORMAL
TROPONIN T SERPL-MCNC: 14 NG/L (ref 6–19)
UROBILINOGEN UR STRIP.AUTO-MCNC: 0.2 MG/DL
WBC # BLD AUTO: 8.7 K/UL (ref 4.8–10.8)
WBC #/AREA URNS HPF: NORMAL /HPF

## 2023-03-17 PROCEDURE — 85025 COMPLETE CBC W/AUTO DIFF WBC: CPT

## 2023-03-17 PROCEDURE — 71045 X-RAY EXAM CHEST 1 VIEW: CPT

## 2023-03-17 PROCEDURE — 99223 1ST HOSP IP/OBS HIGH 75: CPT | Mod: AI | Performed by: STUDENT IN AN ORGANIZED HEALTH CARE EDUCATION/TRAINING PROGRAM

## 2023-03-17 PROCEDURE — 81001 URINALYSIS AUTO W/SCOPE: CPT

## 2023-03-17 PROCEDURE — 700111 HCHG RX REV CODE 636 W/ 250 OVERRIDE (IP): Performed by: EMERGENCY MEDICINE

## 2023-03-17 PROCEDURE — 36415 COLL VENOUS BLD VENIPUNCTURE: CPT

## 2023-03-17 PROCEDURE — 87045 FECES CULTURE AEROBIC BACT: CPT

## 2023-03-17 PROCEDURE — 84484 ASSAY OF TROPONIN QUANT: CPT

## 2023-03-17 PROCEDURE — 83735 ASSAY OF MAGNESIUM: CPT | Mod: 91

## 2023-03-17 PROCEDURE — 83880 ASSAY OF NATRIURETIC PEPTIDE: CPT

## 2023-03-17 PROCEDURE — 700102 HCHG RX REV CODE 250 W/ 637 OVERRIDE(OP): Performed by: STUDENT IN AN ORGANIZED HEALTH CARE EDUCATION/TRAINING PROGRAM

## 2023-03-17 PROCEDURE — 82272 OCCULT BLD FECES 1-3 TESTS: CPT

## 2023-03-17 PROCEDURE — 0241U HCHG SARS-COV-2 COVID-19 NFCT DS RESP RNA 4 TRGT MIC: CPT

## 2023-03-17 PROCEDURE — 99285 EMERGENCY DEPT VISIT HI MDM: CPT

## 2023-03-17 PROCEDURE — 80053 COMPREHEN METABOLIC PANEL: CPT

## 2023-03-17 PROCEDURE — 93005 ELECTROCARDIOGRAM TRACING: CPT

## 2023-03-17 PROCEDURE — 93005 ELECTROCARDIOGRAM TRACING: CPT | Performed by: EMERGENCY MEDICINE

## 2023-03-17 PROCEDURE — 84145 PROCALCITONIN (PCT): CPT

## 2023-03-17 PROCEDURE — 700102 HCHG RX REV CODE 250 W/ 637 OVERRIDE(OP)

## 2023-03-17 PROCEDURE — 83630 LACTOFERRIN FECAL (QUAL): CPT

## 2023-03-17 PROCEDURE — 96374 THER/PROPH/DIAG INJ IV PUSH: CPT

## 2023-03-17 PROCEDURE — 87899 AGENT NOS ASSAY W/OPTIC: CPT | Mod: 91

## 2023-03-17 PROCEDURE — C9803 HOPD COVID-19 SPEC COLLECT: HCPCS | Performed by: EMERGENCY MEDICINE

## 2023-03-17 PROCEDURE — A9270 NON-COVERED ITEM OR SERVICE: HCPCS

## 2023-03-17 PROCEDURE — 87040 BLOOD CULTURE FOR BACTERIA: CPT

## 2023-03-17 PROCEDURE — A9270 NON-COVERED ITEM OR SERVICE: HCPCS | Performed by: STUDENT IN AN ORGANIZED HEALTH CARE EDUCATION/TRAINING PROGRAM

## 2023-03-17 PROCEDURE — 83605 ASSAY OF LACTIC ACID: CPT

## 2023-03-17 PROCEDURE — 770020 HCHG ROOM/CARE - TELE (206)

## 2023-03-17 PROCEDURE — 700111 HCHG RX REV CODE 636 W/ 250 OVERRIDE (IP): Performed by: STUDENT IN AN ORGANIZED HEALTH CARE EDUCATION/TRAINING PROGRAM

## 2023-03-17 PROCEDURE — 83690 ASSAY OF LIPASE: CPT

## 2023-03-17 RX ORDER — ACETAMINOPHEN 325 MG/1
650 TABLET ORAL EVERY 6 HOURS PRN
Status: DISCONTINUED | OUTPATIENT
Start: 2023-03-17 | End: 2023-03-19 | Stop reason: HOSPADM

## 2023-03-17 RX ORDER — LOVASTATIN 20 MG/1
20 TABLET ORAL DAILY
Status: DISCONTINUED | OUTPATIENT
Start: 2023-03-17 | End: 2023-03-19 | Stop reason: HOSPADM

## 2023-03-17 RX ORDER — METOPROLOL SUCCINATE 25 MG/1
25 TABLET, EXTENDED RELEASE ORAL
Status: DISCONTINUED | OUTPATIENT
Start: 2023-03-17 | End: 2023-03-18

## 2023-03-17 RX ORDER — CHOLECALCIFEROL (VITAMIN D3) 125 MCG
5 CAPSULE ORAL NIGHTLY PRN
Status: DISCONTINUED | OUTPATIENT
Start: 2023-03-17 | End: 2023-03-19 | Stop reason: HOSPADM

## 2023-03-17 RX ORDER — BISACODYL 10 MG
10 SUPPOSITORY, RECTAL RECTAL
Status: DISCONTINUED | OUTPATIENT
Start: 2023-03-17 | End: 2023-03-17

## 2023-03-17 RX ORDER — LOSARTAN POTASSIUM 50 MG/1
100 TABLET ORAL DAILY
Status: DISCONTINUED | OUTPATIENT
Start: 2023-03-17 | End: 2023-03-18

## 2023-03-17 RX ORDER — ENOXAPARIN SODIUM 100 MG/ML
40 INJECTION SUBCUTANEOUS DAILY
Status: DISCONTINUED | OUTPATIENT
Start: 2023-03-17 | End: 2023-03-18

## 2023-03-17 RX ORDER — FUROSEMIDE 10 MG/ML
40 INJECTION INTRAMUSCULAR; INTRAVENOUS
Status: DISCONTINUED | OUTPATIENT
Start: 2023-03-17 | End: 2023-03-18

## 2023-03-17 RX ORDER — POLYETHYLENE GLYCOL 3350 17 G/17G
1 POWDER, FOR SOLUTION ORAL
Status: DISCONTINUED | OUTPATIENT
Start: 2023-03-17 | End: 2023-03-17

## 2023-03-17 RX ORDER — FUROSEMIDE 10 MG/ML
40 INJECTION INTRAMUSCULAR; INTRAVENOUS ONCE
Status: COMPLETED | OUTPATIENT
Start: 2023-03-17 | End: 2023-03-17

## 2023-03-17 RX ORDER — LABETALOL HYDROCHLORIDE 5 MG/ML
10 INJECTION, SOLUTION INTRAVENOUS EVERY 4 HOURS PRN
Status: DISCONTINUED | OUTPATIENT
Start: 2023-03-17 | End: 2023-03-19 | Stop reason: HOSPADM

## 2023-03-17 RX ORDER — AMLODIPINE BESYLATE 5 MG/1
5 TABLET ORAL DAILY
Status: DISCONTINUED | OUTPATIENT
Start: 2023-03-17 | End: 2023-03-19 | Stop reason: HOSPADM

## 2023-03-17 RX ORDER — AMOXICILLIN 250 MG
2 CAPSULE ORAL 2 TIMES DAILY
Status: DISCONTINUED | OUTPATIENT
Start: 2023-03-17 | End: 2023-03-17

## 2023-03-17 RX ORDER — HYDROXYZINE HYDROCHLORIDE 25 MG/1
25 TABLET, FILM COATED ORAL 3 TIMES DAILY PRN
Status: DISCONTINUED | OUTPATIENT
Start: 2023-03-17 | End: 2023-03-19 | Stop reason: HOSPADM

## 2023-03-17 RX ORDER — AZITHROMYCIN 250 MG/1
500 TABLET, FILM COATED ORAL DAILY
Status: DISCONTINUED | OUTPATIENT
Start: 2023-03-17 | End: 2023-03-17

## 2023-03-17 RX ADMIN — ENOXAPARIN SODIUM 40 MG: 40 INJECTION SUBCUTANEOUS at 17:03

## 2023-03-17 RX ADMIN — LOVASTATIN 20 MG: 20 TABLET ORAL at 14:05

## 2023-03-17 RX ADMIN — ACETAMINOPHEN 650 MG: 325 TABLET, FILM COATED ORAL at 20:24

## 2023-03-17 RX ADMIN — FUROSEMIDE 40 MG: 10 INJECTION, SOLUTION INTRAMUSCULAR; INTRAVENOUS at 07:57

## 2023-03-17 RX ADMIN — Medication 5 MG: at 21:39

## 2023-03-17 RX ADMIN — LOSARTAN POTASSIUM 100 MG: 50 TABLET, FILM COATED ORAL at 14:05

## 2023-03-17 RX ADMIN — HYDROXYZINE HYDROCHLORIDE 25 MG: 25 TABLET, FILM COATED ORAL at 21:38

## 2023-03-17 RX ADMIN — AMLODIPINE BESYLATE 5 MG: 5 TABLET ORAL at 12:00

## 2023-03-17 RX ADMIN — FUROSEMIDE 40 MG: 10 INJECTION, SOLUTION INTRAMUSCULAR; INTRAVENOUS at 17:03

## 2023-03-17 ASSESSMENT — LIFESTYLE VARIABLES
SUBSTANCE_ABUSE: 0
CONSUMPTION TOTAL: INCOMPLETE
EVER FELT BAD OR GUILTY ABOUT YOUR DRINKING: NO
HAVE YOU EVER FELT YOU SHOULD CUT DOWN ON YOUR DRINKING: NO
ALCOHOL_USE: NO
HAVE PEOPLE ANNOYED YOU BY CRITICIZING YOUR DRINKING: NO
TOTAL SCORE: 0
TOTAL SCORE: 0
EVER HAD A DRINK FIRST THING IN THE MORNING TO STEADY YOUR NERVES TO GET RID OF A HANGOVER: NO
TOTAL SCORE: 0

## 2023-03-17 ASSESSMENT — COGNITIVE AND FUNCTIONAL STATUS - GENERAL
PERSONAL GROOMING: A LITTLE
MOVING FROM LYING ON BACK TO SITTING ON SIDE OF FLAT BED: A LITTLE
DAILY ACTIVITIY SCORE: 16
TOILETING: A LITTLE
HELP NEEDED FOR BATHING: A LOT
CLIMB 3 TO 5 STEPS WITH RAILING: A LOT
DRESSING REGULAR LOWER BODY CLOTHING: A LITTLE
SUGGESTED CMS G CODE MODIFIER MOBILITY: CK
EATING MEALS: A LOT
SUGGESTED CMS G CODE MODIFIER DAILY ACTIVITY: CK
DRESSING REGULAR UPPER BODY CLOTHING: A LITTLE
MOVING TO AND FROM BED TO CHAIR: A LITTLE
TURNING FROM BACK TO SIDE WHILE IN FLAT BAD: A LITTLE
STANDING UP FROM CHAIR USING ARMS: A LITTLE
WALKING IN HOSPITAL ROOM: A LITTLE
MOBILITY SCORE: 17

## 2023-03-17 ASSESSMENT — ENCOUNTER SYMPTOMS
FEVER: 0
NERVOUS/ANXIOUS: 0
COUGH: 0
MYALGIAS: 0
NAUSEA: 0
ORTHOPNEA: 0
DIZZINESS: 0
SPUTUM PRODUCTION: 0
BLURRED VISION: 0
DIARRHEA: 1
SHORTNESS OF BREATH: 1
DOUBLE VISION: 0
VOMITING: 0
BLOOD IN STOOL: 0
WHEEZING: 1
SORE THROAT: 0
CHILLS: 0
WEAKNESS: 1
HEADACHES: 0

## 2023-03-17 ASSESSMENT — PAIN DESCRIPTION - PAIN TYPE
TYPE: ACUTE PAIN
TYPE: ACUTE PAIN

## 2023-03-17 ASSESSMENT — FIBROSIS 4 INDEX: FIB4 SCORE: 2.65

## 2023-03-17 NOTE — ED PROVIDER NOTES
ED Provider Note    CHIEF COMPLAINT  Chief Complaint   Patient presents with    Shortness of Breath     Reports on and off x2 days. Pt denies any cards hx.     Diarrhea     Reports x3 days. Denies any n/v.      Abdominal Cramping     Reports x3 days. Reports that it was slightly relieved with pepto last noc        EXTERNAL RECORDS REVIEWED  Outpatient Notes followed by Dr. Edu Gary.  Patient with history of hypertension and peripheral edema.  She is prescribed Lasix.  Most recent echocardiogram in my system was in 2020 EF 65%.  She was noted to have a severely dilated left atrium mild valvular dysfunction.    HPI/ROS  LIMITATION TO HISTORY   Select: Language  ,  Used   OUTSIDE HISTORIAN(S):  Daughter who is an ICU nurse describes history as below    Veneracion Reyes Villagracia is a 89 y.o. female who presents to the emergency department primarily because of shortness of breath.  Over the last 3 days patient has not complained of diarrhea.  Several episodes of diarrhea each day nonbloody watery stool.  Associated abdominal cramping.  For the last 2 days she has had off-and-on breathlessness but worse this morning.  Daughter hears wheezing when the patient ambulates and she is having a hard time walking.  She has not had a fever or chills.  No recent antibiotics.  No travel out of the country abnormal foods or known ill exposure.  She has not had congestion runny nose.  No cough.  No complaints of pain currently.  Denies abdominal pain.  No dysuria.  She has had some muscle cramps that cause discomfort.    PAST MEDICAL HISTORY   has a past medical history of Arthritis, Cataract, Dental disorder, GI bleed, Heart burn, High cholesterol, Hypercholesteremia, Hypertension, and Urinary tract infection (08/2019).    SURGICAL HISTORY   has a past surgical history that includes breast biopsy (Left, 6/14/2018); other; anterior and posterior repair (N/A, 9/3/2019); enterocele repair (N/A, 9/3/2019); bladder sling  "female (N/A, 9/3/2019); and vaginal suspension (N/A, 9/3/2019).    FAMILY HISTORY  Family History   Problem Relation Age of Onset    Stroke Father     Breast Cancer Sister     Alcohol abuse Brother        SOCIAL HISTORY  Social History     Tobacco Use    Smoking status: Never    Smokeless tobacco: Never   Vaping Use    Vaping Use: Never used   Substance and Sexual Activity    Alcohol use: No    Drug use: No    Sexual activity: Never     Partners: Male       CURRENT MEDICATIONS  Home Medications       Reviewed by Maikel Jones R.N. (Registered Nurse) on 03/17/23 at 0443  Med List Status: Not Addressed     Medication Last Dose Status   amLODIPine (NORVASC) 5 MG Tab  Active   fluticasone (FLONASE) 50 MCG/ACT nasal spray  Active   furosemide (LASIX) 20 MG Tab  Active   losartan (COZAAR) 100 MG Tab  Active   lovastatin (MEVACOR) 20 MG Tab  Active   metoprolol SR (TOPROL XL) 25 MG TABLET SR 24 HR  Active                    ALLERGIES  Allergies   Allergen Reactions    Penicillins Rash     Difficulty breathing rash       PHYSICAL EXAM  VITAL SIGNS: BP (!) 153/73   Pulse 68   Temp 36.2 °C (97.1 °F) (Temporal)   Resp 16   Ht 1.626 m (5' 4\")   Wt 64 kg (141 lb)   SpO2 95%   BMI 24.20 kg/m²    Constitutional:  Respiratory distress.  Elderly ill-appearing female  HENT: Normal inspection  Eyes: Normal inspection  Neck: Grossly normal range of motion.  Cardiovascular: Normal heart rate, Normal rhythm.  Symmetric peripheral pulses.   Thorax & Lungs: increased work of breathing with mild respiratory distress, No wheezing, bilateral crackles slightly worse on the right than the left.  Abdomen: Bowel sounds normal, soft, non-distended, nontender, no mass  Skin: No obvious rash.  Back: No tenderness, No CVA tenderness.   Extremities: Mild symmetric lower extremity edema  Neurologic: Grossly normal   Psychiatric: Normal for situation    DIAGNOSTIC STUDIES / PROCEDURES  EKG/LABS  Results for orders placed or performed during " the hospital encounter of 03/17/23   TROPONIN   Result Value Ref Range    Troponin T 14 6 - 19 ng/L   proBrain Natriuretic Peptide, NT   Result Value Ref Range    NT-proBNP 3067 (H) 0 - 125 pg/mL   Lactic Acid   Result Value Ref Range    Lactic Acid 1.3 0.5 - 2.0 mmol/L   Comp Metabolic Panel   Result Value Ref Range    Sodium 131 (L) 135 - 145 mmol/L    Potassium 4.4 3.6 - 5.5 mmol/L    Chloride 100 96 - 112 mmol/L    Co2 18 (L) 20 - 33 mmol/L    Anion Gap 13.0 7.0 - 16.0    Glucose 119 (H) 65 - 99 mg/dL    Bun 22 8 - 22 mg/dL    Creatinine 0.70 0.50 - 1.40 mg/dL    Calcium 9.5 8.5 - 10.5 mg/dL    AST(SGOT) 67 (H) 12 - 45 U/L    ALT(SGPT) 79 (H) 2 - 50 U/L    Alkaline Phosphatase 122 (H) 30 - 99 U/L    Total Bilirubin 0.6 0.1 - 1.5 mg/dL    Albumin 4.2 3.2 - 4.9 g/dL    Total Protein 7.5 6.0 - 8.2 g/dL    Globulin 3.3 1.9 - 3.5 g/dL    A-G Ratio 1.3 g/dL   LIPASE   Result Value Ref Range    Lipase 77 11 - 82 U/L   MAGNESIUM   Result Value Ref Range    Magnesium 1.9 1.5 - 2.5 mg/dL   CBC WITH DIFFERENTIAL   Result Value Ref Range    WBC 8.7 4.8 - 10.8 K/uL    RBC 3.78 (L) 4.20 - 5.40 M/uL    Hemoglobin 12.5 12.0 - 16.0 g/dL    Hematocrit 38.4 37.0 - 47.0 %    .6 (H) 81.4 - 97.8 fL    MCH 33.1 (H) 27.0 - 33.0 pg    MCHC 32.6 (L) 33.6 - 35.0 g/dL    RDW 50.5 (H) 35.9 - 50.0 fL    Platelet Count 200 164 - 446 K/uL    MPV 10.5 9.0 - 12.9 fL    Neutrophils-Polys 60.70 44.00 - 72.00 %    Lymphocytes 26.60 22.00 - 41.00 %    Monocytes 10.60 0.00 - 13.40 %    Eosinophils 1.30 0.00 - 6.90 %    Basophils 0.30 0.00 - 1.80 %    Immature Granulocytes 0.50 0.00 - 0.90 %    Nucleated RBC 0.00 /100 WBC    Neutrophils (Absolute) 5.30 2.00 - 7.15 K/uL    Lymphs (Absolute) 2.32 1.00 - 4.80 K/uL    Monos (Absolute) 0.92 (H) 0.00 - 0.85 K/uL    Eos (Absolute) 0.11 0.00 - 0.51 K/uL    Baso (Absolute) 0.03 0.00 - 0.12 K/uL    Immature Granulocytes (abs) 0.04 0.00 - 0.11 K/uL    NRBC (Absolute) 0.00 K/uL   ESTIMATED GFR   Result  Value Ref Range    GFR (CKD-EPI) 82 >60 mL/min/1.73 m 2   CORRECTED CALCIUM   Result Value Ref Range    Correct Calcium 9.3 8.5 - 10.5 mg/dL   EKG (NOW)   Result Value Ref Range    Report       [facility] Emergency Dept.    Test Date:  2023  Pt Name:    RADHA FIORE       Department: ER  MRN:        [id number]                      Room:  Gender:     Female                       Technician: [id number]  :        [id number]                   Requested By:ER TRIAGE PROTOCOL  Order #:    [id number]                    Reading MD: SERENE WEIR MD    Measurements  Intervals                                Axis  Rate:       62                           P:  CA:                                      QRS:        53  QRSD:       108                          T:          42  QT:         440  QTc:        447    Interpretive Statements  Atrial fibrillation  Compared to ECG 2023 03:59:57  Sinus rhythm no longer present  Ventricular premature complex(es) no longer present  Electronically Signed On 3- 5:33:29 PDT by SERENE WEIR MD        I have independently interpreted this EKG  Rhythm strip interpretation-atrial fibrillation        RADIOLOGY  I have independently interpreted the diagnostic imaging associated with this visit and am waiting the final reading from the radiologist.   My preliminary interpretation is as follows: Chest x-ray with pulmonary edema  Radiologist interpretation:   DX-CHEST-PORTABLE (1 VIEW)   Final Result         1.  Interstitial pulmonary parenchymal prominence, compatible with interstitial edema and/or infiltrates.   2.  Trace bilateral pleural effusion   3.  Cardiomegaly   4.  Atherosclerosis            COURSE & MEDICAL DECISION MAKING      INITIAL ASSESSMENT, COURSE AND PLAN  Care Narrative: Patient presents with dyspnea.  She appears clinically unwell.  She has respiratory difficulty but no fany distress.  Oxygenation is acceptable.  She has lower  extremity edema and crackles to the mid lung fields bilaterally.  Arrival she underwent an EKG because of a complaint of dyspnea.  This demonstrates atrial fibrillation.  This is a new finding for her.  Suspect congestive heart failure.  Within differential is pneumonia.  Work-up is undertaken.  Unclear cause of diarrhea.  She has had cramping will obtain electrolytes.    Chest x-ray shows pulmonary edema.  She has pleural effusion and cardiomegaly.  No leukocytosis or left shift.  Does not appear to represent pneumonia.  He has mild electrolyte disturbances.  She has elevated LFTs without tenderness to the right upper abdomen.  Could be from passive congestion.  Will need further exploration.  Requires hospitalization.  Hospitalist was paged for admission    ADDITIONAL PROBLEM LIST  Diarrhea-stool sample  DISPOSITION AND DISCUSSIONS  I have discussed management of the patient with the following physicians and WILFREDO's: Dr. Reynolds.  Hospitalist she will admit    Discussion of management with other [facility] or appropriate source(s): Pharmacy reviewed Lasix administration      FINAL DIAGNOSIS  Acute decompensated congestive heart failure  New onset atrial fibrillation  Diarrhea  Elevated liver function tests  Hyponatremia    CRITICAL CARE TIME 35 minutes  There was a very real possibility of deterioration of the patient's condition.  This patient required the highest level of care.  I provided critical care services which included: review of the medical record, treatment orders, ordering and reviewing test results, frequent reevaluation of the patient's condition and response to treatment, as well as discussing the case with appropriate personnel and various consultants. The critical care time associated with the care of this patient is exclusive of any procedures or specific interventions.      Electronically signed by: Sanjeev Bass M.D., 3/17/2023 5:52 AM

## 2023-03-17 NOTE — ED NOTES
Pt placed on 2L of oxygen d/t saturations in the high 80's on room air. After interventions, oxygen is up to mid 90's

## 2023-03-17 NOTE — ASSESSMENT & PLAN NOTE
Acute respiratory failure with hypoxia O2 saturation mid 80s on room air, patient has significant pulmonary edema on imaging differential diagnosis.  Includes acute heart failure versus flash pulmonary edema from hypertensive urgency/emergancy  -IV diuresis  -No evidence of infection, initially started on azithromycin for possible atypical pneumonia however procalcitonin is negative and given her current diarrhea this has been stopped  -Monitor closely  - RT protocol, DuoNebs as needed as patient does have some wheezing  -Encourage I-S and mobility  Echocardiogram performed elevated RVSP preserved ejection fraction suggestive of heart failure with preserved ejection fraction.  Continue with diuresis no mortality benefit medications in the setting.  Continue with rate control strategy.

## 2023-03-17 NOTE — ASSESSMENT & PLAN NOTE
Uncontrolled (188/76), per daughter has been elevated at home recently despite med complaince  - resume home BP medications   - BID IV lasix   - prn antihypertensives   - echo pending   - monitor and adjust medications as needed  -Resolving patient with positive sponsor diuresis improvement in blood pressure patient borderline hypotensive given her age have held patient's amlodipine have decreased patient's losartan to 50 mg from 100 mg.  We will hold off on further diuresis today.  We will consider diuresis again on 3/19/2022 with subsequent transition to likely torsemide 5 mg daily.

## 2023-03-17 NOTE — ASSESSMENT & PLAN NOTE
Acute pulmonary edema likely due to new acute heart failure versus hypertensive urgency patient's blood pressure is in has been elevated per daughter  -Chest x-ray with diffuse interstitial pulmonary infiltrates  -IV diuresis Lasix twice daily  -O2 per protocol  -Monitor volume status  -Resolving

## 2023-03-17 NOTE — ED TRIAGE NOTES
"Chief Complaint   Patient presents with    Shortness of Breath     Reports on and off x2 days. Pt denies any cards hx.     Diarrhea     Reports x3 days. Denies any n/v.      Abdominal Cramping     Reports x3 days. Reports that it was slightly relieved with pepto last noc      Pt wheeled to triage for above complaint. Pt denies any CP/palpitations. Denies fevers/chills at home.     Pt A&Ox4, GCS15, NAD. Abd pain protocol ordered in triage, Pt placed back in [facility] lobby and encouraged to alert staff of any changes    BP (!) 153/73   Pulse 68   Temp 36.2 °C (97.1 °F) (Temporal)   Resp 16   Ht 1.626 m (5' 4\")   Wt 64 kg (141 lb)   SpO2 95%   BMI 24.20 kg/m²     "

## 2023-03-17 NOTE — H&P
Hospital Medicine History & Physical Note    Date of Service  3/17/2023    Primary Care Physician  Yogesh Torres M.D.    Consultants  NA    Specialist Names: NA    Code Status  Full Code    Chief Complaint  Chief Complaint   Patient presents with    Shortness of Breath     Reports on and off x2 days. Pt denies any cards hx.     Diarrhea     Reports x3 days. Denies any n/v.      Abdominal Cramping     Reports x3 days. Reports that it was slightly relieved with pepto last noc        History of Presenting Illness  Veneracion Reyes Villagracia is a 89 y.o. Tagalog only speaking female with a past medical history of hypertension, hyperlipidemia and arthritis who presented 3/17/2023 with complaints of 2 days of worsening shortness of breath and 3 days of 4-5 loose watery stools with some abdominal cramping.  Shortness of breath has progressively gotten worse she has not both at rest and with exertion she denies any orthopnea.  She denies any cough, fever or chills.  Denies any recent sick contacts.  In terms of her diarrhea she states that she has been eating and drinking well despite this she denies any current abdominal pain denies any blood in her stools denies any recent travel any recent antibiotics or abnormal food intake.  She denies any chest pain.     In the ER patient is afebrile temperature 97.1, HR 68, RR 15-28, BP elevated 153/73 she is hypoxic on room air 87% requiring 2 L of oxygen.  Labs remarkable for normal WBC, sodium 131, bicarb 18, glucose 119, AST 67, ALT of 79 and alk phos of 122.  Troponin normal at 14, elevated BNP of 3067.  UA is negative aside from small leukocyte Estrace however patient denies any urinary symptoms.,  Procalcitonin is negative at 0.09, her viral panel is negative.    Her chest x-ray was personally reviewed which shows bilateral diffuse pulmonary infiltrates/edema.    I discussed the plan of care with patient and family.    Review of Systems  Review of Systems    Constitutional:  Positive for malaise/fatigue. Negative for chills and fever.   HENT:  Positive for hearing loss (chronic). Negative for congestion and sore throat.    Eyes:  Negative for blurred vision and double vision.   Respiratory:  Positive for shortness of breath and wheezing. Negative for cough and sputum production.    Cardiovascular:  Negative for chest pain and orthopnea.   Gastrointestinal:  Positive for diarrhea. Negative for blood in stool, melena, nausea and vomiting.   Genitourinary:  Negative for dysuria, frequency and urgency.   Musculoskeletal:  Negative for myalgias.   Neurological:  Positive for weakness. Negative for dizziness and headaches.   Psychiatric/Behavioral:  Negative for substance abuse. The patient is not nervous/anxious.      Past Medical History   has a past medical history of Arthritis, Cataract, Dental disorder, GI bleed, Heart burn, High cholesterol, Hypercholesteremia, Hypertension, and Urinary tract infection (08/2019).    Surgical History   has a past surgical history that includes breast biopsy (Left, 6/14/2018); other; anterior and posterior repair (N/A, 9/3/2019); enterocele repair (N/A, 9/3/2019); bladder sling female (N/A, 9/3/2019); and vaginal suspension (N/A, 9/3/2019).     Family History  family history includes Alcohol abuse in her brother; Breast Cancer in her sister; Stroke in her father.   Family history reviewed with patient. There is no family history that is pertinent to the chief complaint.     Social History   reports that she has never smoked. She has never used smokeless tobacco. She reports that she does not drink alcohol and does not use drugs.    Allergies  Allergies   Allergen Reactions    Penicillins Shortness of Breath and Rash     Difficulty breathing rash       Medications  Prior to Admission Medications   Prescriptions Last Dose Informant Patient Reported? Taking?   amLODIPine (NORVASC) 5 MG Tab 3/16/2023 at [facility] Family Member No No   Sig: Take 1  Tablet by mouth every day.   furosemide (LASIX) 20 MG Tab 3/16/2023 at [facility] Family Member No No   Sig: Take 1 Tablet by mouth every day.   losartan (COZAAR) 100 MG Tab 3/16/2023 at [facility] Family Member No No   Sig: Take 1 Tablet by mouth every day.   lovastatin (MEVACOR) 20 MG Tab 3/16/2023 at [facility] Family Member No No   Sig: Take 1 Tablet by mouth every day.   metoprolol SR (TOPROL XL) 25 MG TABLET SR 24 HR 3/16/2023 at [facility] Family Member No No   Sig: Take 1 Tablet by mouth every day.      Facility-Administered Medications: None       Physical Exam  Temp:  [36.2 °C (97.1 °F)] 36.2 °C (97.1 °F)  Pulse:  [57-75] 69  Resp:  [15-28] 21  BP: (144-188)/(65-76) 149/66  SpO2:  [87 %-97 %] 96 %  Blood Pressure : (!) 149/66   Temperature: 36.2 °C (97.1 °F)   Pulse: 69   Respiration: (!) 21   Pulse Oximetry: 96 %       Physical Exam  Vitals and nursing note reviewed.   Constitutional:       General: She is not in acute distress.     Appearance: She is normal weight. She is ill-appearing. She is not toxic-appearing.   HENT:      Head: Normocephalic and atraumatic.      Mouth/Throat:      Mouth: Mucous membranes are moist.      Pharynx: Oropharynx is clear.   Eyes:      Extraocular Movements: Extraocular movements intact.      Conjunctiva/sclera: Conjunctivae normal.   Cardiovascular:      Rate and Rhythm: Normal rate. Rhythm irregular.      Heart sounds: Normal heart sounds.   Pulmonary:      Breath sounds: Rales present.      Comments: Diffuse crackles, increased work of breathing  Abdominal:      General: Bowel sounds are normal. There is no distension.      Palpations: Abdomen is soft.      Tenderness: There is no abdominal tenderness. There is no guarding or rebound.      Comments: Hyperactive bowel sounds   Musculoskeletal:      Cervical back: Neck supple.      Right lower leg: Edema present.      Left lower leg: Edema present.      Comments: Mild bilateral lower extremity edema   Skin:     General: Skin is warm and dry.    Neurological:      General: No focal deficit present.      Mental Status: She is alert and oriented to person, place, and time. Mental status is at baseline.   Psychiatric:         Mood and Affect: Mood normal.         Behavior: Behavior normal.         Thought Content: Thought content normal.         Judgment: Judgment normal.       Laboratory:  Recent Labs     03/17/23  0550   WBC 8.7   RBC 3.78*   HEMOGLOBIN 12.5   HEMATOCRIT 38.4   .6*   MCH 33.1*   MCHC 32.6*   RDW 50.5*   PLATELETCT 200   MPV 10.5     Recent Labs     03/17/23  0550   SODIUM 131*   POTASSIUM 4.4   CHLORIDE 100   CO2 18*   GLUCOSE 119*   BUN 22   CREATININE 0.70   CALCIUM 9.5     Recent Labs     03/17/23  0550   ALTSGPT 79*   ASTSGOT 67*   ALKPHOSPHAT 122*   TBILIRUBIN 0.6   LIPASE 77   GLUCOSE 119*         Recent Labs     03/17/23  0550   NTPROBNP 3067*         Recent Labs     03/17/23  0550   TROPONINT 14       Imaging:  DX-CHEST-PORTABLE (1 VIEW)   Final Result         1.  Interstitial pulmonary parenchymal prominence, compatible with interstitial edema and/or infiltrates.   2.  Trace bilateral pleural effusion   3.  Cardiomegaly   4.  Atherosclerosis      EC-ECHOCARDIOGRAM COMPLETE W/O CONT    (Results Pending)       X-Ray:  I have personally reviewed the images and compared with prior images.  EKG:  I have personally reviewed the images and compared with prior images.    Assessment/Plan:  Justification for Admission Status  I anticipate this patient will require at least two midnights for appropriate medical management, necessitating inpatient admission because patient presents with acute respiratory failure with pulmonary edema concerning for possible acute heart failure.  Patient also found to have atrial fibrillation which is a new diagnosis.  She will require IV diuresis and close monitoring as well as further cardiac work-up I anticipate patient require greater than 2 midnight stay for complete cardiac work-up, IV diuresis and  medication adjustment.    Patient will need a Telemetry bed on MEDICAL service .  The need is secondary to suspected acute heart failure causing respiratory failure..    * Acute respiratory failure with hypoxia (HCC)- (present on admission)  Assessment & Plan  Acute respiratory failure with hypoxia O2 saturation mid 80s on room air, patient has significant pulmonary edema on imaging differential diagnosis.  Includes acute heart failure versus flash pulmonary edema from hypertensive urgency/emergancy  -IV diuresis  -No evidence of infection, initially started on azithromycin for possible atypical pneumonia however procalcitonin is negative and given her current diarrhea this has been stopped  -Monitor closely  - RT protocol, DuoNebs as needed as patient does have some wheezing  -Encourage I-S and mobility    Acute heart failure (HCC)- (present on admission)  Assessment & Plan  Patient presents with progressive shortness of breath and hypoxia found to have significant pulmonary edema concerning for acute heart failure (ishcemic vs rate related given new dx of A.fib?)  - elevated BNP, normal trop   - EKG with new A.fib, no ischemic changes   -IV diuresis with 40 mg twice daily Lasix  -Continue home metoprolol  - echocardiogram pending  -Monitor I's and O's, daily weight  -Monitor on telemetry  -Further plan of care pending echocardiogram results    Elevated liver enzymes- (present on admission)  Assessment & Plan  Mild elevation in AST/ALT, suspect hepatic congestion  - no RUQ pain   - monitor on diuresis, hold on imaging for now   - check Hep panel given diarrheal illness     AF (atrial fibrillation) ([facility])  Assessment & Plan  Pt found to be in atrial fibrillation, no previous hx   - currently rate controlled, continue home metoprolol   - echocardiogram pending   - will need to discus AC as she does have elevated Chadsvasc score,continue with DVT ppx for now pending further echo results/discussion, per chart pt has hx  of GI bleed?    Diarrhea- (present on admission)  Assessment & Plan  3 days of non-bloody diarrhea, watery 4-5 episodes daily with associated abdominal cramping, unclear etiology   - no recent abx use however pt is advanced age and at risk for C.diff, will rule out C.diff   - stool culture/occult blood/lactoferin  Ordered   - supportive care   - monitor closely, if no improvement, may need further evaluation with imaging     Acute pulmonary edema (HCC)- (present on admission)  Assessment & Plan  Acute pulmonary edema likely due to new acute heart failure versus hypertensive urgency patient's blood pressure is in has been elevated per daughter  -Chest x-ray with diffuse interstitial pulmonary infiltrates  -IV diuresis Lasix twice daily  -O2 per protocol  -Monitor volume status    Hypertensive urgency- (present on admission)  Assessment & Plan  Uncontrolled (188/76), per daughter has been elevated at home recently despite med complaince  - resume home BP medications   - BID IV lasix   - prn antihypertensives   - echo pending   - monitor and adjust medications as needed     Dyslipidemia- (present on admission)  Assessment & Plan  Hx of, on statin therapy   Lipid panel not at goal 7 months ago  Repeat labs   Not on high intensity statin, may need to change pending further workup  Continue         VTE prophylaxis: enoxaparin ppx

## 2023-03-17 NOTE — ASSESSMENT & PLAN NOTE
Pt found to be in atrial fibrillation, no previous hx   - currently rate controlled, continue home metoprolol   - echocardiogram pending   - will need to discus AC as she does have elevated Chadsvasc score,continue with DVT ppx for now pending further echo results/discussion, per chart pt has hx of GI bleed?    We will endeavor to have rate control strategy versus rhythm control strategy given patient's advanced age.  Antiarrhythmics offer a greater side effect profile and benefit from continued rhythm control may likely be unattainable in a 89-year-old advanced age individual.  Continue with beta-blocker therapy.

## 2023-03-17 NOTE — ED NOTES
Bedside report to receiving RN Hans for [id number]. Questions answered. Pt transported upstairs by RN with family at side. All belongings accounted for.

## 2023-03-17 NOTE — ASSESSMENT & PLAN NOTE
Patient approximately 3 L of output over the past 24 hours.  We will hold off on further diuresis today patient mildly hypotensive.  We will transition patient likely to oral diuretic on 3/19/2023.  Likely torsemide 5 mg daily.  Continue with I's and O's continue less than 2 g sodium daily.Patient presents with progressive shortness of breath and hypoxia found to have significant pulmonary edema concerning for acute heart failure (ishcemic vs rate related given new dx of A.fib?)  - elevated BNP, normal trop   - EKG with new A.fib, no ischemic changes   -IV diuresis with 40 mg twice daily Lasix  -Continue home metoprolol  - echocardiogram pending  -Monitor I's and O's, daily weight  -Monitor on telemetry  -Further plan of care pending echocardiogram results

## 2023-03-17 NOTE — ASSESSMENT & PLAN NOTE
Mild elevation in AST/ALT, suspect hepatic congestion  - no RUQ pain   - monitor on diuresis, hold on imaging for now   - check Hep panel given diarrheal illness

## 2023-03-17 NOTE — ASSESSMENT & PLAN NOTE
3 days of non-bloody diarrhea, watery 4-5 episodes daily with associated abdominal cramping, unclear etiology   - no recent abx use however pt is advanced age and at risk for C.diff, will rule out C.diff   - stool culture/occult blood/lactoferin  Ordered   - supportive care   - monitor closely, if no improvement, may need further evaluation with imaging  -Resolving unlikely C. difficile have discontinued this testing.

## 2023-03-17 NOTE — ED NOTES
Pharmacy Medication Reconciliation      ~Medication reconciliation updated and complete per patient family at bedside  ~Allergies have been verified and updated   ~No oral ABX within the last 30 days  ~Patient home pharmacy :  [facility]

## 2023-03-17 NOTE — ASSESSMENT & PLAN NOTE
Hx of, on statin therapy   Lipid panel not at goal 7 months ago  Repeat labs   Not on high intensity statin, may need to change pending further workup  Continue

## 2023-03-18 ENCOUNTER — APPOINTMENT (OUTPATIENT)
Dept: CARDIOLOGY | Facility: MEDICAL CENTER | Age: 88
DRG: 291 | End: 2023-03-18
Attending: STUDENT IN AN ORGANIZED HEALTH CARE EDUCATION/TRAINING PROGRAM
Payer: MEDICARE

## 2023-03-18 LAB
ALBUMIN SERPL BCP-MCNC: 3.7 G/DL (ref 3.2–4.9)
ALBUMIN/GLOB SERPL: 1.2 G/DL
ALP SERPL-CCNC: 101 U/L (ref 30–99)
ALT SERPL-CCNC: 53 U/L (ref 2–50)
ANION GAP SERPL CALC-SCNC: 10 MMOL/L (ref 7–16)
AST SERPL-CCNC: 33 U/L (ref 12–45)
BASOPHILS # BLD AUTO: 0.4 % (ref 0–1.8)
BASOPHILS # BLD: 0.04 K/UL (ref 0–0.12)
BILIRUB SERPL-MCNC: 0.8 MG/DL (ref 0.1–1.5)
BUN SERPL-MCNC: 22 MG/DL (ref 8–22)
CALCIUM ALBUM COR SERPL-MCNC: 9.7 MG/DL (ref 8.5–10.5)
CALCIUM SERPL-MCNC: 9.5 MG/DL (ref 8.5–10.5)
CHLORIDE SERPL-SCNC: 103 MMOL/L (ref 96–112)
CHOLEST SERPL-MCNC: 120 MG/DL (ref 100–199)
CO2 SERPL-SCNC: 23 MMOL/L (ref 20–33)
CREAT SERPL-MCNC: 0.96 MG/DL (ref 0.5–1.4)
E COLI SXT1+2 STL IA: NORMAL
EOSINOPHIL # BLD AUTO: 0.14 K/UL (ref 0–0.51)
EOSINOPHIL NFR BLD: 1.5 % (ref 0–6.9)
ERYTHROCYTE [DISTWIDTH] IN BLOOD BY AUTOMATED COUNT: 50 FL (ref 35.9–50)
GFR SERPLBLD CREATININE-BSD FMLA CKD-EPI: 56 ML/MIN/1.73 M 2
GLOBULIN SER CALC-MCNC: 3 G/DL (ref 1.9–3.5)
GLUCOSE SERPL-MCNC: 112 MG/DL (ref 65–99)
HCT VFR BLD AUTO: 36.6 % (ref 37–47)
HDLC SERPL-MCNC: 53 MG/DL
HGB BLD-MCNC: 12.1 G/DL (ref 12–16)
IMM GRANULOCYTES # BLD AUTO: 0.03 K/UL (ref 0–0.11)
IMM GRANULOCYTES NFR BLD AUTO: 0.3 % (ref 0–0.9)
LDLC SERPL CALC-MCNC: 44 MG/DL
LV EJECT FRACT  99904: 55
LV EJECT FRACT MOD 4C 99902: 60.48
LYMPHOCYTES # BLD AUTO: 1.89 K/UL (ref 1–4.8)
LYMPHOCYTES NFR BLD: 20.3 % (ref 22–41)
MCH RBC QN AUTO: 33.3 PG (ref 27–33)
MCHC RBC AUTO-ENTMCNC: 33.1 G/DL (ref 33.6–35)
MCV RBC AUTO: 100.8 FL (ref 81.4–97.8)
MONOCYTES # BLD AUTO: 1.23 K/UL (ref 0–0.85)
MONOCYTES NFR BLD AUTO: 13.2 % (ref 0–13.4)
NEUTROPHILS # BLD AUTO: 5.98 K/UL (ref 2–7.15)
NEUTROPHILS NFR BLD: 64.3 % (ref 44–72)
NRBC # BLD AUTO: 0 K/UL
NRBC BLD-RTO: 0 /100 WBC
PLATELET # BLD AUTO: 189 K/UL (ref 164–446)
PMV BLD AUTO: 10.5 FL (ref 9–12.9)
POTASSIUM SERPL-SCNC: 4.1 MMOL/L (ref 3.6–5.5)
PROT SERPL-MCNC: 6.7 G/DL (ref 6–8.2)
RBC # BLD AUTO: 3.63 M/UL (ref 4.2–5.4)
SIGNIFICANT IND 70042: NORMAL
SITE SITE: NORMAL
SODIUM SERPL-SCNC: 136 MMOL/L (ref 135–145)
SOURCE SOURCE: NORMAL
TRIGL SERPL-MCNC: 117 MG/DL (ref 0–149)
WBC # BLD AUTO: 9.3 K/UL (ref 4.8–10.8)

## 2023-03-18 PROCEDURE — 97535 SELF CARE MNGMENT TRAINING: CPT

## 2023-03-18 PROCEDURE — 99233 SBSQ HOSP IP/OBS HIGH 50: CPT | Mod: 25 | Performed by: STUDENT IN AN ORGANIZED HEALTH CARE EDUCATION/TRAINING PROGRAM

## 2023-03-18 PROCEDURE — 700102 HCHG RX REV CODE 250 W/ 637 OVERRIDE(OP): Performed by: STUDENT IN AN ORGANIZED HEALTH CARE EDUCATION/TRAINING PROGRAM

## 2023-03-18 PROCEDURE — 85025 COMPLETE CBC W/AUTO DIFF WBC: CPT

## 2023-03-18 PROCEDURE — 99497 ADVNCD CARE PLAN 30 MIN: CPT | Performed by: STUDENT IN AN ORGANIZED HEALTH CARE EDUCATION/TRAINING PROGRAM

## 2023-03-18 PROCEDURE — 700111 HCHG RX REV CODE 636 W/ 250 OVERRIDE (IP): Performed by: STUDENT IN AN ORGANIZED HEALTH CARE EDUCATION/TRAINING PROGRAM

## 2023-03-18 PROCEDURE — 93306 TTE W/DOPPLER COMPLETE: CPT

## 2023-03-18 PROCEDURE — 36415 COLL VENOUS BLD VENIPUNCTURE: CPT

## 2023-03-18 PROCEDURE — 700117 HCHG RX CONTRAST REV CODE 255: Performed by: STUDENT IN AN ORGANIZED HEALTH CARE EDUCATION/TRAINING PROGRAM

## 2023-03-18 PROCEDURE — 700101 HCHG RX REV CODE 250: Performed by: STUDENT IN AN ORGANIZED HEALTH CARE EDUCATION/TRAINING PROGRAM

## 2023-03-18 PROCEDURE — 770020 HCHG ROOM/CARE - TELE (206)

## 2023-03-18 PROCEDURE — A9270 NON-COVERED ITEM OR SERVICE: HCPCS | Performed by: STUDENT IN AN ORGANIZED HEALTH CARE EDUCATION/TRAINING PROGRAM

## 2023-03-18 PROCEDURE — 80053 COMPREHEN METABOLIC PANEL: CPT

## 2023-03-18 PROCEDURE — 80061 LIPID PANEL: CPT

## 2023-03-18 PROCEDURE — 93306 TTE W/DOPPLER COMPLETE: CPT | Mod: 26 | Performed by: INTERNAL MEDICINE

## 2023-03-18 PROCEDURE — 700105 HCHG RX REV CODE 258: Performed by: STUDENT IN AN ORGANIZED HEALTH CARE EDUCATION/TRAINING PROGRAM

## 2023-03-18 RX ORDER — METOPROLOL TARTRATE 1 MG/ML
5 INJECTION, SOLUTION INTRAVENOUS
Status: DISCONTINUED | OUTPATIENT
Start: 2023-03-18 | End: 2023-03-19 | Stop reason: HOSPADM

## 2023-03-18 RX ORDER — LIDOCAINE 50 MG/G
1 PATCH TOPICAL DAILY
Status: DISCONTINUED | OUTPATIENT
Start: 2023-03-18 | End: 2023-03-19 | Stop reason: HOSPADM

## 2023-03-18 RX ORDER — SODIUM CHLORIDE, SODIUM LACTATE, POTASSIUM CHLORIDE, AND CALCIUM CHLORIDE .6; .31; .03; .02 G/100ML; G/100ML; G/100ML; G/100ML
500 INJECTION, SOLUTION INTRAVENOUS ONCE
Status: COMPLETED | OUTPATIENT
Start: 2023-03-18 | End: 2023-03-18

## 2023-03-18 RX ORDER — LOSARTAN POTASSIUM 50 MG/1
50 TABLET ORAL DAILY
Status: DISCONTINUED | OUTPATIENT
Start: 2023-03-19 | End: 2023-03-19 | Stop reason: HOSPADM

## 2023-03-18 RX ORDER — TORSEMIDE 5 MG/1
5 TABLET ORAL
Status: DISCONTINUED | OUTPATIENT
Start: 2023-03-19 | End: 2023-03-19 | Stop reason: HOSPADM

## 2023-03-18 RX ADMIN — LOVASTATIN 20 MG: 20 TABLET ORAL at 06:00

## 2023-03-18 RX ADMIN — LIDOCAINE 1 PATCH: 50 PATCH TOPICAL at 12:52

## 2023-03-18 RX ADMIN — AMLODIPINE BESYLATE 5 MG: 5 TABLET ORAL at 05:25

## 2023-03-18 RX ADMIN — METOPROLOL SUCCINATE 25 MG: 25 TABLET, EXTENDED RELEASE ORAL at 09:20

## 2023-03-18 RX ADMIN — APIXABAN 5 MG: 5 TABLET, FILM COATED ORAL at 14:17

## 2023-03-18 RX ADMIN — SODIUM CHLORIDE, POTASSIUM CHLORIDE, SODIUM LACTATE AND CALCIUM CHLORIDE 500 ML: 600; 310; 30; 20 INJECTION, SOLUTION INTRAVENOUS at 12:56

## 2023-03-18 RX ADMIN — ACETAMINOPHEN 650 MG: 325 TABLET, FILM COATED ORAL at 11:12

## 2023-03-18 RX ADMIN — LOSARTAN POTASSIUM 100 MG: 50 TABLET, FILM COATED ORAL at 05:25

## 2023-03-18 RX ADMIN — FUROSEMIDE 40 MG: 10 INJECTION, SOLUTION INTRAMUSCULAR; INTRAVENOUS at 05:26

## 2023-03-18 RX ADMIN — FUROSEMIDE 40 MG: 10 INJECTION, SOLUTION INTRAMUSCULAR; INTRAVENOUS at 16:43

## 2023-03-18 RX ADMIN — HUMAN ALBUMIN MICROSPHERES AND PERFLUTREN 3 ML: 10; .22 INJECTION, SOLUTION INTRAVENOUS at 11:57

## 2023-03-18 ASSESSMENT — ENCOUNTER SYMPTOMS
BLURRED VISION: 0
CHILLS: 0
VOMITING: 0
WHEEZING: 1
SORE THROAT: 0
SPUTUM PRODUCTION: 0
BLOOD IN STOOL: 0
NERVOUS/ANXIOUS: 0
WEAKNESS: 1
NAUSEA: 0
ORTHOPNEA: 0
SHORTNESS OF BREATH: 1
COUGH: 0
MYALGIAS: 0
DIARRHEA: 1
DOUBLE VISION: 0
FEVER: 0
DIZZINESS: 0
HEADACHES: 0

## 2023-03-18 ASSESSMENT — FIBROSIS 4 INDEX
FIB4 SCORE: 2.16
FIB4 SCORE: 2.16

## 2023-03-18 ASSESSMENT — PAIN DESCRIPTION - PAIN TYPE: TYPE: ACUTE PAIN

## 2023-03-18 ASSESSMENT — LIFESTYLE VARIABLES: SUBSTANCE_ABUSE: 0

## 2023-03-18 NOTE — THERAPY
Physical Therapy Contact Note    Patient Name: Veneracion Reyes Villagracia  Age:  89 y.o., Sex:  female  Medical Record #: [id number]  Today's Date: 3/18/2023    PT Consult received/acknnowledged. PT tx attempted, home set-up obtained. Pts dtr requesting to defer PT eval this afternoon to allow pt to rest stating she was up all night after receiving Lasix. Dtr has been assisting her to commode throughout the day.       03/18/23 1525   Prior Living Situation   Prior Services None   Housing / Facility 1 Story House   Steps Into Home 2   Bathroom Set up Bathtub / Shower Combination   Equipment Owned Single Point Cane   Lives with - Patient's Self Care Capacity Adult Children   Comments Lives with adult dtr however per other dtr present pt was fairly ind not requiring support from dtr that lives with her. However dtr reports pt recently requesting FWW and WC upon DC from hospital given concern of weakness.   Prior Level of Functional Mobility   Bed Mobility Independent   Transfer Status Independent   Ambulation Independent   Ambulation Distance Community distances   Assistive Devices Used Single Point Cane   Stairs Independent

## 2023-03-18 NOTE — PROGRESS NOTES
MONITOR SUMMARY:     Rhythm: a-fib  Rate: 54 - 74  Ectopy: (r)PVC  Measurements: --/.09/--

## 2023-03-18 NOTE — CARE PLAN
The patient is Watcher - Medium risk of patient condition declining or worsening    Shift Goals  Clinical Goals: clinical workup  Patient Goals: comfort  Family Goals: clinical workup, echo    Progress made toward(s) clinical / shift goals:    Problem: Knowledge Deficit - Standard  Goal: Patient and family/care givers will demonstrate understanding of plan of care, disease process/condition, diagnostic tests and medications  Outcome: Progressing       Patient is not progressing towards the following goals:

## 2023-03-18 NOTE — CARE PLAN
The patient is Stable - Low risk of patient condition declining or worsening    Shift Goals  Clinical Goals: Monitor work of breathing. Wean O2 to baseline of room air. Provide interpretive services  Patient Goals: Sleep  Family Goals: Comfort and anxiety reduction for patient.    Progress made toward(s) clinical / shift goals:    Problem: Knowledge Deficit - Standard  Goal: Patient and family/care givers will demonstrate understanding of plan of care, disease process/condition, diagnostic tests and medications  Outcome: Progressing     Problem: Pain - Standard  Goal: Alleviation of pain or a reduction in pain to the patient’s comfort goal  Outcome: Progressing     Problem: Fall Risk  Goal: Patient will remain free from falls  Outcome: Progressing      Problem: Care Map:  Admission Optimal Outcome for the Heart Failure Patient  Goal: Admission:  Optimal Care of the heart failure patient  Intervention: Start Heart Failure education booklet, provide writing utensils and notepad for questions  Note: Heart failure booklet given to patient.  Intervention: Confirm IV diuretic is ordered  Note: 40mg IV diuretic ordered.  Intervention: Baseline weight documented  (stand up scale, unless contraindicated)  Note: Weight in chart. Patient needs stand up scale weight.  Intervention: Document intake and output per shift. Communicate with care team if volume status is improving, stalled or worsening.  Note: See I&O flowsheet.          Patient is not progressing towards the following goals:

## 2023-03-18 NOTE — PROGRESS NOTES
4 Eyes Skin Assessment Completed by ALANNA Maxwell and ALANNA Pichardo.    Head WDL  Ears WDL  Nose WDL  Mouth WDL  Neck WDL  Breast/Chest WDL  Shoulder Blades WDL  Spine WDL  (R) Arm/Elbow/Hand Bruising  (L) Arm/Elbow/Hand Bruising  Abdomen WDL  Groin WDL  Scrotum/Coccyx/Buttocks WDL  (R) Leg WDL  (L) Leg WDL  (R) Heel/Foot/Toe WDL  (L) Heel/Foot/Toe WDL          Devices In Places Tele Box and Nasal Cannula      Interventions In Place Pillows    Possible Skin Injury No    Pictures Uploaded Into Epic No, needs to be completed  Wound Consult Placed N/A  RN Wound Prevention Protocol Ordered No

## 2023-03-19 ENCOUNTER — PHARMACY VISIT (OUTPATIENT)
Dept: PHARMACY | Facility: MEDICAL CENTER | Age: 88
End: 2023-03-19
Payer: COMMERCIAL

## 2023-03-19 ENCOUNTER — HOME HEALTH ADMISSION (OUTPATIENT)
Dept: HOME HEALTH SERVICES | Facility: HOME HEALTHCARE | Age: 88
End: 2023-03-19
Payer: MEDICARE

## 2023-03-19 VITALS
DIASTOLIC BLOOD PRESSURE: 51 MMHG | HEART RATE: 70 BPM | HEIGHT: 64 IN | WEIGHT: 150.79 LBS | TEMPERATURE: 97.7 F | SYSTOLIC BLOOD PRESSURE: 108 MMHG | OXYGEN SATURATION: 94 % | RESPIRATION RATE: 18 BRPM | BODY MASS INDEX: 25.74 KG/M2

## 2023-03-19 LAB
ANION GAP SERPL CALC-SCNC: 13 MMOL/L (ref 7–16)
BACTERIA STL CULT: NORMAL
BUN SERPL-MCNC: 38 MG/DL (ref 8–22)
C JEJUNI+C COLI AG STL QL: NORMAL
CALCIUM SERPL-MCNC: 9.4 MG/DL (ref 8.5–10.5)
CHLORIDE SERPL-SCNC: 99 MMOL/L (ref 96–112)
CO2 SERPL-SCNC: 20 MMOL/L (ref 20–33)
CREAT SERPL-MCNC: 1.1 MG/DL (ref 0.5–1.4)
E COLI SXT1+2 STL IA: NORMAL
EKG IMPRESSION: NORMAL
GFR SERPLBLD CREATININE-BSD FMLA CKD-EPI: 47 ML/MIN/1.73 M 2
GLUCOSE SERPL-MCNC: 142 MG/DL (ref 65–99)
POTASSIUM SERPL-SCNC: 4.1 MMOL/L (ref 3.6–5.5)
SIGNIFICANT IND 70042: NORMAL
SITE SITE: NORMAL
SODIUM SERPL-SCNC: 132 MMOL/L (ref 135–145)
SOURCE SOURCE: NORMAL

## 2023-03-19 PROCEDURE — 700102 HCHG RX REV CODE 250 W/ 637 OVERRIDE(OP): Performed by: STUDENT IN AN ORGANIZED HEALTH CARE EDUCATION/TRAINING PROGRAM

## 2023-03-19 PROCEDURE — 93010 ELECTROCARDIOGRAM REPORT: CPT | Performed by: INTERNAL MEDICINE

## 2023-03-19 PROCEDURE — 700101 HCHG RX REV CODE 250: Performed by: STUDENT IN AN ORGANIZED HEALTH CARE EDUCATION/TRAINING PROGRAM

## 2023-03-19 PROCEDURE — A9270 NON-COVERED ITEM OR SERVICE: HCPCS | Performed by: STUDENT IN AN ORGANIZED HEALTH CARE EDUCATION/TRAINING PROGRAM

## 2023-03-19 PROCEDURE — 36415 COLL VENOUS BLD VENIPUNCTURE: CPT

## 2023-03-19 PROCEDURE — 93005 ELECTROCARDIOGRAM TRACING: CPT | Performed by: STUDENT IN AN ORGANIZED HEALTH CARE EDUCATION/TRAINING PROGRAM

## 2023-03-19 PROCEDURE — 99239 HOSP IP/OBS DSCHRG MGMT >30: CPT | Performed by: STUDENT IN AN ORGANIZED HEALTH CARE EDUCATION/TRAINING PROGRAM

## 2023-03-19 PROCEDURE — 80048 BASIC METABOLIC PNL TOTAL CA: CPT

## 2023-03-19 PROCEDURE — RXMED WILLOW AMBULATORY MEDICATION CHARGE: Performed by: STUDENT IN AN ORGANIZED HEALTH CARE EDUCATION/TRAINING PROGRAM

## 2023-03-19 RX ORDER — TORSEMIDE 5 MG/1
5 TABLET ORAL DAILY
Qty: 30 TABLET | Refills: 3 | Status: SHIPPED | OUTPATIENT
Start: 2023-03-20 | End: 2023-04-11 | Stop reason: SDUPTHER

## 2023-03-19 RX ORDER — LIDOCAINE 50 MG/G
1 PATCH TOPICAL EVERY 12 HOURS
Qty: 160 PATCH | Refills: 0 | Status: SHIPPED | OUTPATIENT
Start: 2023-03-19 | End: 2023-06-07

## 2023-03-19 RX ORDER — LOSARTAN POTASSIUM 50 MG/1
50 TABLET ORAL DAILY
Qty: 30 TABLET | Refills: 3 | Status: SHIPPED | OUTPATIENT
Start: 2023-03-20 | End: 2023-04-11 | Stop reason: SDUPTHER

## 2023-03-19 RX ADMIN — APIXABAN 5 MG: 5 TABLET, FILM COATED ORAL at 05:55

## 2023-03-19 RX ADMIN — LOVASTATIN 20 MG: 20 TABLET ORAL at 05:55

## 2023-03-19 RX ADMIN — LIDOCAINE 1 PATCH: 50 PATCH TOPICAL at 05:55

## 2023-03-19 RX ADMIN — ACETAMINOPHEN 650 MG: 325 TABLET, FILM COATED ORAL at 03:24

## 2023-03-19 ASSESSMENT — PAIN DESCRIPTION - PAIN TYPE
TYPE: ACUTE PAIN
TYPE: ACUTE PAIN

## 2023-03-19 NOTE — THERAPY
Occupational Therapy Contact Note    Patient Name: Veneracion Reyes Villagracia  Age:  89 y.o., Sex:  female  Medical Record #: [id number]  Today's Date: 3/19/2023       03/19/23 1347   Initial Contact Note    Initial Contact Note Order Received and Verified. Occupational Therapy Evaluation NOT Completed Because Patient Does Not Require Acute Occupational Therapy at this Time.   Interdisciplinary Plan of Care Collaboration   Collaboration Comments OT eval attempted pt actively discharging eval not completed   Session Information   Date / Session Number  3/19 pt dc'd prior to eval

## 2023-03-19 NOTE — CARE PLAN
Pt medically cleared for discharge per MD. Calm/cooperative/pleasant. Family at bedside. A&Ox4. VSS on 2LNC. C/o pain in jesse knees, lidocaine patch presesnt. Tolerating diet. OOB with Ax1. BSC and FWW ordered per MD. Discharge instructions and AVS reviewed with pt/family per ALANNA Hernández. Discharge medications delivered to bedside. Awaiting home O2 to be delivered. Pt transportation at bedside.      Problem: Knowledge Deficit - Standard  Goal: Patient and family/care givers will demonstrate understanding of plan of care, disease process/condition, diagnostic tests and medications  Outcome: Met     Problem: Pain - Standard  Goal: Alleviation of pain or a reduction in pain to the patient’s comfort goal  Outcome: Met     Problem: Fall Risk  Goal: Patient will remain free from falls  Outcome: Met     Problem: Care Map:  Admission Optimal Outcome for the Heart Failure Patient  Goal: Admission:  Optimal Care of the heart failure patient  Outcome: Met     Problem: Care Map:  Day 1 Optimal Outcome for the Heart Failure Patient  Goal: Day 1:  Optimal Care of the heart failure patient  Outcome: Met     Problem: Care Map:  Day 2 Optimal Outcome for the Heart Failure Patient  Goal: Day 2:  Optimal Care of the heart failure patient  Outcome: Met     Problem: Care Map:  Day 3 Optimal Outcome for the Heart Failure Patient  Goal: Day 3:  Optimal Care of the heart failure patient  Outcome: Met     Problem: Care Map:  Day Before Discharge Optimal Outcome for the Heart Failure Patient  Goal: Day Before Discharge:  Optimal Care of the heart failure patient  Outcome: Met     Problem: Care Map:  Day of Discharge Optimal Outcome for the Heart Failure Patient  Goal: Day of Discharge:  Optimal Care of the heart failure patient  Outcome: Met

## 2023-03-19 NOTE — PROGRESS NOTES
Discharge instructions given to patient. Prescriptions given to patient. Discharge instructions given to patient at bedside, verbalizes understanding and states plans for follow-up with PCP. New and home medication review, post-discharge activity level and worsening of symptoms needing follow-up care discussed. Telemetry monitor/IV cathlon removed. All belongings accounted for, all questions answered at this time. Patient waiting for walker and oxygen delivery prior to discharge.

## 2023-03-19 NOTE — CARE PLAN
The patient is Watcher - Medium risk of patient condition declining or worsening    Shift Goals  Clinical Goals: monitor o2 sats, safety, rest  Patient Goals: rest  Family Goals: comfort    Progress made toward(s) clinical / shift goals:   Problem: Knowledge Deficit - Standard  Goal: Patient and family/care givers will demonstrate understanding of plan of care, disease process/condition, diagnostic tests and medications  Outcome: Progressing     Problem: Pain - Standard  Goal: Alleviation of pain or a reduction in pain to the patient’s comfort goal  Outcome: Progressing     Problem: Fall Risk  Goal: Patient will remain free from falls  Outcome: Progressing       Patient is not progressing towards the following goals:

## 2023-03-19 NOTE — DISCHARGE PLANNING
Received Choice form at 0809  Agency/Facility Name: [facility]   Referral sent per Choice form @ 0883     1307  Received Choice form at 1305  Agency/Facility Name: [facility]   Referral sent per Choice form @ 1305

## 2023-03-19 NOTE — DISCHARGE PLANNING
ATTN: Case Management  RE: Referral for Home Health    As of 3/19/2023, we have accepted the Home Health referral for the patient listed above.    A [facility] clinician will be out to see the patient within 48 hours. If you have any questions or concerns regarding the patient's transition to Home Health, please do not hesitate to contact us at [phone].      We look forward to collaborating with you,  [facility] Health Team

## 2023-03-19 NOTE — DISCHARGE PLANNING
Received choice form @: 2966  Agency/Facility name: [facility]  Sent referral per choice form @: [phone]  Referral sent to [facility] as per request.

## 2023-03-19 NOTE — DISCHARGE PLANNING
Received choice form @: 1000  Agency/Facility name: [facility]  Sent referral per choice form @: 1009  Referral sent to [facility] for FWW

## 2023-03-19 NOTE — DISCHARGE PLANNING
Approved services for medication Losartan, Metoprolol, Torsemide, Lidocaine Patches. $50.85    0805- SCOOBY called patient's daughter's to discuss dc plan for home with home health services. They chose [facility]. Choice faxed to [facility] to send referral out.     0930 - CM spoke with daughter's regarding IMM for patient. The agreed with discharge plan, verbal by daughter.     0950 - Message from RN stating patient's family changed mind on HH agengcy, they would like to have [facility]. Choice faxed to [facility] to send to [facility]. SCOOBY requested RN give family [facility] number to follow up with tomorrow. PT notes state patient requesting FWW to go home with. MD made aware. Choice faxed to [facility] to send to [facility]. Requested RN get FWW from traction.     1025 -  RN messaged , requesting a letter from hospital for patient's admission and discharge for patient's daughter's. SCOOBY faxed letter to RN to give to family.     1100 - Choice for [facility] for Home Oxygen     1245 - CM called [facility] to check on delivery. Spoke with On call office, they are reaching out to tech to get oxygen delivered. RN made aware    1305 - Jroge with [facility] called CM in regards to referral. He states he will be here with oxygen delivery within the half hour. RN made aware.

## 2023-03-19 NOTE — FACE TO FACE
"Face to Face Note  -  Durable Medical Equipment    Anjel Tao M.D. - NPI: [id number]  I certify that this patient is under my care and that they had a durable medical equipment(DME)face to face encounter by myself that meets the physician DME face-to-face encounter requirements with this patient on:    Date of encounter:   Patient:                    MRN:                       YOB: 2023  Veneracion Reyes Villagracia  [id number]  4/17/1932     The encounter with the patient was in whole, or in part, for the following medical condition, which is the primary reason for durable medical equipment:  CHF    I certify that, based on my findings, the following durable medical equipment is medically necessary:    Oxygen   HOME O2 Saturation Measurements:(Values must be present for Home Oxygen orders)  Room air sat at rest: 87  Room air sat with amb: 84  With liters of O2: 1, O2 sat at rest with O2: 94  With Liters of O2: 2, O2 sat with amb with O2 : 94  Is the patient mobile?: Yes  If patient feels more short of breath, they can go up to 6 liters per minute and contact healthcare provider.    Supporting Symptoms: The patient requires supplemental oxygen, as the following interventions have been tried with limited or no improvement: \"Ambulation with oximetry.    My Clinical findings support the need for the above equipment due to:  Hypoxia  "

## 2023-03-19 NOTE — DISCHARGE SUMMARY
Discharge Summary    CHIEF COMPLAINT ON ADMISSION  Chief Complaint   Patient presents with    Shortness of Breath     Reports on and off x2 days. Pt denies any cards hx.     Diarrhea     Reports x3 days. Denies any n/v.      Abdominal Cramping     Reports x3 days. Reports that it was slightly relieved with pepto last noc        Reason for Admission  diarrhea;SOB     Admission Date  3/17/2023    CODE STATUS  Full Code    HPI & HOSPITAL COURSE  This is a 89 y.o. female here with shortness of breath diarrhea  Veneracion Reyes Villagracia is a 89 y.o. Tagalog only speaking female with a past medical history of hypertension, hyperlipidemia and arthritis who presented 3/17/2023 with complaints of 2 days of worsening shortness of breath and 3 days of 4-5 loose watery stools with some abdominal cramping.  Shortness of breath has progressively gotten worse she has not both at rest and with exertion she denies any orthopnea.  She denies any cough, fever or chills.  Denies any recent sick contacts.  In terms of her diarrhea she states that she has been eating and drinking well despite this she denies any current abdominal pain denies any blood in her stools denies any recent travel any recent antibiotics or abnormal food intake.  She denies any chest pain.      In the ER patient is afebrile temperature 97.1, HR 68, RR 15-28, BP elevated 153/73 she is hypoxic on room air 87% requiring 2 L of oxygen.  Labs remarkable for normal WBC, sodium 131, bicarb 18, glucose 119, AST 67, ALT of 79 and alk phos of 122.  Troponin normal at 14, elevated BNP of 3067.  UA is negative aside from small leukocyte Estrace however patient denies any urinary symptoms.,  Procalcitonin is negative at 0.09, her viral panel is negative.  During hospitalization patient had positive sponsor diuretic therapy after undergoing initial fluid resuscitation.  Patient had echocardiogram performed which was significant for a preserved ejection fraction of 55%  elevated right ventricular systolic pressure of 45-50.  Patient was quiring minimal amounts of oxygen prior to discharge.  Patient had reversion to sinus rhythm prior to discharge.  Patient was symptomatically improved.    Had long discussion with patient's 3 daughters at bedside in detail.  There was significant discussion regarding anticoagulation in the setting of atrial fibrillation and advanced age.  Patient certainly does warrant anticoagulation.  Technically patient does not meet dose reduction criteria for Eliquis.  Had long discussion with regards to this dosing patient's daughters endorse understanding.  They requested/asked if reduced dosing 2.5 mg Eliquis twice daily could be a reasonable alternative as they are concerned the patient is at increased risk of fall.  I counseled him that while this does not meet tactical guidelines certainly this could be an option although she may not benefit from maximal anticoagulation.  They endorsed understanding of the risks of reduced dose anticoagulant with teach back.  Furthermore counseled them with regards to appropriate medication dosing metoprolol succinate was discontinued metoprolol tartrate 25 mg twice daily was initiated as patient does not have heart failure with reduced ejection fraction.  Furthermore with metoprolol tartrate if needed they may give additional dose of this medication if patient has elevated heart rate in the future.  Furthermore with regards to patient's diuretic I have discontinued patient's oral Lasix and place patient on torsemide 5 mg daily.  Lastly, DME was provided in the form of walker and bedside commode.  Patient will close follow-up with her primary care provider of record.  Therefore, she is discharged in good and stable condition to home with organized home healthcare and close outpatient follow-up.    The patient met 2-midnight criteria for an inpatient stay at the time of discharge.    Discharge Date  3/19/2023    FOLLOW UP  ITEMS POST DISCHARGE  Take all medication as prescribed  Go to all follow-up appointments as indicated    DISCHARGE DIAGNOSES  Principal Problem:    Acute respiratory failure with hypoxia (HCC) POA: Yes  Active Problems:    Dyslipidemia POA: Yes    Hypertensive urgency POA: Yes    Acute pulmonary edema (HCC) POA: Yes    Acute heart failure (HCC) POA: Yes    Diarrhea POA: Yes    AF (atrial fibrillation) (HCC) POA: Clinically Undetermined    Elevated liver enzymes POA: Yes  Resolved Problems:    * No resolved hospital problems. *      FOLLOW UP  Future Appointments   Date Time Provider Department Center   3/23/2023  3:40 PM Yogesh Torres M.D. [address]     Yogesh Torres M.D.  [address]  [address]  [facility] [address]  [phone]    Schedule an appointment as soon as possible for a visit  As needed  Post hospital discharge viral gastroenteritis precipitating atrial fibrillation and heart failure preserved ejection fraction exacerbation.      MEDICATIONS ON DISCHARGE     Medication List        START taking these medications        Instructions   apixaban 2.5mg Tabs  Commonly known as: ELIQUIS   Take 1 Tablet by mouth 2 times a day. Indications: Thromboembolism secondary to Atrial Fibrillation  Dose: 2.5 mg     lidocaine 5 % Ptch  Commonly known as: LIDODERM   Place 1 Patch on the skin every 12 hours for 80 days.  Dose: 1 Patch     metoprolol tartrate 25 MG Tabs  Commonly known as: LOPRESSOR   Take 1 Tablet by mouth 2 times a day.  Dose: 25 mg     torsemide 5 MG Tabs  Start taking on: March 20, 2023  Commonly known as: DEMADEX   Take 1 Tablet by mouth every day.  Dose: 5 mg            CHANGE how you take these medications        Instructions   losartan 50 MG Tabs  Start taking on: March 20, 2023  What changed:   medication strength  how much to take  Commonly known as: COZAAR   Take 1 Tablet by mouth every day.  Dose: 50 mg            CONTINUE taking these medications        Instructions    lovastatin 20 MG Tabs  Commonly known as: MEVACOR   Take 1 Tablet by mouth every day.  Dose: 20 mg            STOP taking these medications      amLODIPine 5 MG Tabs  Commonly known as: NORVASC     furosemide 20 MG Tabs  Commonly known as: LASIX     metoprolol SR 25 MG Tb24  Commonly known as: TOPROL XL              Allergies  Allergies   Allergen Reactions    Penicillins Shortness of Breath and Rash     Difficulty breathing rash       DIET  Orders Placed This Encounter   Procedures    Diet Order Diet: Cardiac; Second Modifier: (optional): 2 Gram Sodium     Standing Status:   Standing     Number of Occurrences:   1     Order Specific Question:   Diet:     Answer:   Cardiac [6]     Order Specific Question:   Second Modifier: (optional)     Answer:   2 Gram Sodium [7]       ACTIVITY  As tolerated.  Weight bearing as tolerated    CONSULTATIONS  None    PROCEDURES  None    LABORATORY  Lab Results   Component Value Date    SODIUM 132 (L) 03/19/2023    POTASSIUM 4.1 03/19/2023    CHLORIDE 99 03/19/2023    CO2 20 03/19/2023    GLUCOSE 142 (H) 03/19/2023    BUN 38 (H) 03/19/2023    CREATININE 1.10 03/19/2023        Lab Results   Component Value Date    WBC 9.3 03/18/2023    HEMOGLOBIN 12.1 03/18/2023    HEMATOCRIT 36.6 (L) 03/18/2023    PLATELETCT 189 03/18/2023      Please note that this dictation was created using voice recognition software. I have made every reasonable attempt to correct obvious errors, but I expect that there are errors of grammar and possibly context that I did not discover before finalizing the note.    Total time of the discharge process exceeds 35 minutes.

## 2023-03-19 NOTE — PROGRESS NOTES
Hospital Medicine Daily Progress Note    Date of Service  3/18/2023    Chief Complaint  Veneracion Reyes Villagracia is a 89 y.o. female admitted 3/17/2023 with shortness of breath    Hospital Course  Veneracion Reyes Villagracia is a 89 y.o. Tagalog only speaking female with a past medical history of hypertension, hyperlipidemia and arthritis who presented 3/17/2023 with complaints of 2 days of worsening shortness of breath and 3 days of 4-5 loose watery stools with some abdominal cramping.  Shortness of breath has progressively gotten worse she has not both at rest and with exertion she denies any orthopnea.  She denies any cough, fever or chills.  Denies any recent sick contacts.  In terms of her diarrhea she states that she has been eating and drinking well despite this she denies any current abdominal pain denies any blood in her stools denies any recent travel any recent antibiotics or abnormal food intake.  She denies any chest pain.      In the ER patient is afebrile temperature 97.1, HR 68, RR 15-28, BP elevated 153/73 she is hypoxic on room air 87% requiring 2 L of oxygen.  Labs remarkable for normal WBC, sodium 131, bicarb 18, glucose 119, AST 67, ALT of 79 and alk phos of 122.  Troponin normal at 14, elevated BNP of 3067.  UA is negative aside from small leukocyte Estrace however patient denies any urinary symptoms.,  Procalcitonin is negative at 0.09, her viral panel is negative.    Interval Problem Update  3/18/2023:  Patient having positive response to diuresis we will continue with diuresis on 3/19/2023.  Patient had approximately 3 L of fluid output over the past 24 hours.  Echocardiogram reviewed elevated right ventricular systolic pressure.  Ejection fraction preserved at approximately 60%.  Long discussion was had with patient's 2 daughters at bedside ENT detail regarding findings.  They are in agreement for conservative medical management at this point time.  I am supportive of their decision.   We will endeavor to have a rate control strategy fluid optimization in addition to anticoagulation initiation of the form of Eliquis.  Consideration was given to possibility of Eliquis reduced dosing however patient does have kilogram body mass of 6 greater than 60 precluding reduced dose.  As per guidelines reduced dosing would be in the setting of a patient greater than 80 years old and a weight of less than 60 kg or a serum creatinine of 1.5 or greater.  Patient has serum creatinine less than 1.5.    I discussed advance care planning with the patient and family for at least 30 minutes, including diagnosis, prognosis, plan of care, risks and benefits of any therapies that could be offered, as well as alternatives including palliation and hospice, as appropriate.      I have discussed this patient's plan of care and discharge plan at IDT rounds today with Case Management, Nursing, Nursing leadership, and other members of the IDT team.    Consultants/Specialty  None    Code Status  Full Code    Disposition  Patient is not medically cleared for discharge.   Anticipate discharge to to home with organized home healthcare and close outpatient follow-up.  I have placed the appropriate orders for post-discharge needs.    Review of Systems  Review of Systems   Constitutional:  Positive for malaise/fatigue. Negative for chills and fever.   HENT:  Positive for hearing loss (chronic). Negative for congestion and sore throat.    Eyes:  Negative for blurred vision and double vision.   Respiratory:  Positive for shortness of breath and wheezing. Negative for cough and sputum production.    Cardiovascular:  Negative for chest pain and orthopnea.   Gastrointestinal:  Positive for diarrhea. Negative for blood in stool, melena, nausea and vomiting.   Genitourinary:  Negative for dysuria, frequency and urgency.   Musculoskeletal:  Negative for myalgias.   Neurological:  Positive for weakness. Negative for dizziness and headaches.    Psychiatric/Behavioral:  Negative for substance abuse. The patient is not nervous/anxious.       Physical Exam  Temp:  [36.5 °C (97.7 °F)-36.8 °C (98.2 °F)] 36.8 °C (98.2 °F)  Pulse:  [62-79] 63  Resp:  [16-18] 16  BP: (104-136)/(41-54) 120/41  SpO2:  [94 %-96 %] 96 %    Physical Exam  Vitals and nursing note reviewed.   Constitutional:       General: She is not in acute distress.     Appearance: She is normal weight. She is not ill-appearing or toxic-appearing.   HENT:      Head: Normocephalic and atraumatic.      Mouth/Throat:      Mouth: Mucous membranes are moist.      Pharynx: Oropharynx is clear.   Eyes:      Extraocular Movements: Extraocular movements intact.      Conjunctiva/sclera: Conjunctivae normal.   Cardiovascular:      Rate and Rhythm: Normal rate. Rhythm irregular.      Heart sounds: Normal heart sounds.   Pulmonary:      Breath sounds: Rales present.      Comments: Diffuse crackles, increased work of breathing  Abdominal:      General: Bowel sounds are normal. There is no distension.      Palpations: Abdomen is soft.      Tenderness: There is no abdominal tenderness. There is no guarding or rebound.      Comments: Hyperactive bowel sounds   Musculoskeletal:      Cervical back: Neck supple.      Right lower leg: No edema.      Left lower leg: No edema.      Comments: Mild bilateral lower extremity edema   Skin:     General: Skin is warm and dry.   Neurological:      General: No focal deficit present.      Mental Status: She is alert and oriented to person, place, and time. Mental status is at baseline.   Psychiatric:         Mood and Affect: Mood normal.         Behavior: Behavior normal.         Thought Content: Thought content normal.         Judgment: Judgment normal.       Fluids    Intake/Output Summary (Last 24 hours) at 3/18/2023 1803  Last data filed at 3/18/2023 0510  Gross per 24 hour   Intake 520 ml   Output 700 ml   Net -180 ml       Laboratory  Recent Labs     03/17/23  0503  03/18/23  0431   WBC 8.7 9.3   RBC 3.78* 3.63*   HEMOGLOBIN 12.5 12.1   HEMATOCRIT 38.4 36.6*   .6* 100.8*   MCH 33.1* 33.3*   MCHC 32.6* 33.1*   RDW 50.5* 50.0   PLATELETCT 200 189   MPV 10.5 10.5     Recent Labs     03/17/23  0550 03/18/23 0431   SODIUM 131* 136   POTASSIUM 4.4 4.1   CHLORIDE 100 103   CO2 18* 23   GLUCOSE 119* 112*   BUN 22 22   CREATININE 0.70 0.96   CALCIUM 9.5 9.5             Recent Labs     03/18/23 0431   TRIGLYCERIDE 117   HDL 53   LDL 44       Imaging  EC-ECHOCARDIOGRAM COMPLETE W/ CONT   Final Result      DX-CHEST-PORTABLE (1 VIEW)   Final Result         1.  Interstitial pulmonary parenchymal prominence, compatible with interstitial edema and/or infiltrates.   2.  Trace bilateral pleural effusion   3.  Cardiomegaly   4.  Atherosclerosis           Assessment/Plan  * Acute respiratory failure with hypoxia (HCC)- (present on admission)  Assessment & Plan  Acute respiratory failure with hypoxia O2 saturation mid 80s on room air, patient has significant pulmonary edema on imaging differential diagnosis.  Includes acute heart failure versus flash pulmonary edema from hypertensive urgency/emergancy  -IV diuresis  -No evidence of infection, initially started on azithromycin for possible atypical pneumonia however procalcitonin is negative and given her current diarrhea this has been stopped  -Monitor closely  - RT protocol, DuoNebs as needed as patient does have some wheezing  -Encourage I-S and mobility  Echocardiogram performed elevated RVSP preserved ejection fraction suggestive of heart failure with preserved ejection fraction.  Continue with diuresis no mortality benefit medications in the setting.  Continue with rate control strategy.    Elevated liver enzymes- (present on admission)  Assessment & Plan  Mild elevation in AST/ALT, suspect hepatic congestion  - no RUQ pain   - monitor on diuresis, hold on imaging for now   - check Hep panel given diarrheal illness     AF (atrial  fibrillation) (HCC)  Assessment & Plan  Pt found to be in atrial fibrillation, no previous hx   - currently rate controlled, continue home metoprolol   - echocardiogram pending   - will need to discus AC as she does have elevated Chadsvasc score,continue with DVT ppx for now pending further echo results/discussion, per chart pt has hx of GI bleed?    We will endeavor to have rate control strategy versus rhythm control strategy given patient's advanced age.  Antiarrhythmics offer a greater side effect profile and benefit from continued rhythm control may likely be unattainable in a 89-year-old advanced age individual.  Continue with beta-blocker therapy.    Diarrhea- (present on admission)  Assessment & Plan  3 days of non-bloody diarrhea, watery 4-5 episodes daily with associated abdominal cramping, unclear etiology   - no recent abx use however pt is advanced age and at risk for C.diff, will rule out C.diff   - stool culture/occult blood/lactoferin  Ordered   - supportive care   - monitor closely, if no improvement, may need further evaluation with imaging  -Resolving unlikely C. difficile have discontinued this testing.    Acute heart failure (HCC)- (present on admission)  Assessment & Plan    Patient approximately 3 L of output over the past 24 hours.  We will hold off on further diuresis today patient mildly hypotensive.  We will transition patient likely to oral diuretic on 3/19/2023.  Likely torsemide 5 mg daily.  Continue with I's and O's continue less than 2 g sodium daily.Patient presents with progressive shortness of breath and hypoxia found to have significant pulmonary edema concerning for acute heart failure (ishcemic vs rate related given new dx of A.fib?)  - elevated BNP, normal trop   - EKG with new A.fib, no ischemic changes   -IV diuresis with 40 mg twice daily Lasix  -Continue home metoprolol  - echocardiogram pending  -Monitor I's and O's, daily weight  -Monitor on telemetry  -Further plan of  care pending echocardiogram results    Acute pulmonary edema (HCC)- (present on admission)  Assessment & Plan  Acute pulmonary edema likely due to new acute heart failure versus hypertensive urgency patient's blood pressure is in has been elevated per daughter  -Chest x-ray with diffuse interstitial pulmonary infiltrates  -IV diuresis Lasix twice daily  -O2 per protocol  -Monitor volume status  -Resolving    Hypertensive urgency- (present on admission)  Assessment & Plan  Uncontrolled (188/76), per daughter has been elevated at home recently despite med complaince  - resume home BP medications   - BID IV lasix   - prn antihypertensives   - echo pending   - monitor and adjust medications as needed  -Resolving patient with positive sponsor diuresis improvement in blood pressure patient borderline hypotensive given her age have held patient's amlodipine have decreased patient's losartan to 50 mg from 100 mg.  We will hold off on further diuresis today.  We will consider diuresis again on 3/19/2022 with subsequent transition to likely torsemide 5 mg daily.    Dyslipidemia- (present on admission)  Assessment & Plan  Hx of, on statin therapy   Lipid panel not at goal 7 months ago  Repeat labs   Not on high intensity statin, may need to change pending further workup  Continue        Please note that this dictation was created using voice recognition software. I have made every reasonable attempt to correct obvious errors, but I expect that there are errors of grammar and possibly context that I did not discover before finalizing the note.    VTE prophylaxis: SCDs/TEDs and therapeutic anticoagulation with Eliquis 5 mg twice daily    I have performed a physical exam and reviewed and updated ROS and Plan today (3/18/2023). In review of yesterday's note (3/17/2023), there are no changes except as documented above.

## 2023-03-19 NOTE — DISCHARGE INSTRUCTIONS
HF Patient Discharge Instructions  Monitor your weight daily, and maintain a weight chart, to track your weight changes.   Activity as tolerated, unless your Doctor has ordered otherwise.   Follow a low fat, low cholesterol, low salt diet unless instructed otherwise by your Doctor. Read the labels on the back of food products and track your intake of fat, cholesterol and salt.   Fluid Restriction No. If a Fluid Restriction has been ordered by your Doctor, measure fluids with a measuring cup to ensure that you are not exceeding the restriction.   No smoking.  Oxygen Yes. If your Doctor has ordered that you wear Oxygen at home, it is important to wear it as ordered.  Did you receive an explanation from staff on the importance of taking each of your medications and why it is necessary to keep taking them unless your doctor says to stop? Yes  Were all of your questions answered about how to manage your heart failure and what to do if you have increased signs and symptoms after you go home? Yes  Do you feel like your heart failure care team involved you in the care treatment plan and allowed you to make decisions regarding your care while in the hospital and addressed any discharge needs you might have? Yes    See the educational handout provided at discharge for more information on monitoring your daily weight, activity and diet. This also explains more about Heart Failure, symptoms of a flare-up and some of the tests that you have undergone.     Warning Signs of a Flare-Up include:  Swelling in the ankles or lower legs.  Shortness of breath, while at rest, or while doing normal activities.   Shortness of breath at night when in bed, or coughing in bed.   Requiring more pillows to sleep at night, or needing to sit up at night to sleep.  Feeling weak, dizzy or fatigued.     When to call your Doctor:  Call [employer] seven days a week from 8:00 a.m. to 8:00 p.m. for medical questions [phone].  Call  your Primary Care Physician or Cardiologist if:   You experience any pain radiating to your jaw or neck.  You have any difficulty breathing.  You experience weight gain of 3 lbs in a day or 5 lbs in a week.   You feel any palpitations or irregular heartbeats.  You become dizzy or lose consciousness.   If you have had an angiogram or had a pacemaker or AICD placed, and experience:  Bleeding, drainage or swelling at the surgical / puncture site.  Fever greater than 100.0 F  Shock from internal defibrillator.  Cool and / or numb extremities.     Please access the AHA My HF Guide/Heart Failure Interactive Workbook:   [email or web address]

## 2023-03-19 NOTE — HOSPITAL COURSE
Veneracion Reyes Villagracia is a 89 y.o. Tagalog only speaking female with a past medical history of hypertension, hyperlipidemia and arthritis who presented 3/17/2023 with complaints of 2 days of worsening shortness of breath and 3 days of 4-5 loose watery stools with some abdominal cramping.  Shortness of breath has progressively gotten worse she has not both at rest and with exertion she denies any orthopnea.  She denies any cough, fever or chills.  Denies any recent sick contacts.  In terms of her diarrhea she states that she has been eating and drinking well despite this she denies any current abdominal pain denies any blood in her stools denies any recent travel any recent antibiotics or abnormal food intake.  She denies any chest pain.      In the ER patient is afebrile temperature 97.1, HR 68, RR 15-28, BP elevated 153/73 she is hypoxic on room air 87% requiring 2 L of oxygen.  Labs remarkable for normal WBC, sodium 131, bicarb 18, glucose 119, AST 67, ALT of 79 and alk phos of 122.  Troponin normal at 14, elevated BNP of 3067.  UA is negative aside from small leukocyte Estrace however patient denies any urinary symptoms.,  Procalcitonin is negative at 0.09, her viral panel is negative.

## 2023-03-19 NOTE — FACE TO FACE
Face to Face Supporting Documentation - Home Health    The encounter with this patient was in whole or in part the primary reason for home health admission.    Date of encounter:   Patient:                    MRN:                       YOB: 2023  Veneracion Reyes Villagracia  [id number]  4/17/1932     Home health to see patient for:  Skilled Nursing care for assessment, interventions & education, Home health aide, Physical Therapy evaluation and treatment, and Occupational therapy evaluation and treatment    Skilled need for:  New Onset Medical Diagnosis atrial fibrillation heart failure preserved ejection fraction status post exacerbation    Skilled nursing interventions to include:  Comment: Medication review and adherence    Homebound status evidenced by:  Needs the assistance of another person in order to leave the home. Leaving home requires a considerable and taxing effort. There is a normal inability to leave the home.    Community Physician to provide follow up care: Yogesh Torres M.D.     Optional Interventions? No      I certify the face to face encounter for this home health care referral meets the CMS requirements and the encounter/clinical assessment with the patient was, in whole, or in part, for the medical condition(s) listed above, which is the primary reason for home health care. Based on my clinical findings: the service(s) are medically necessary, support the need for home health care, and the homebound criteria are met.  I certify that this patient has had a face to face encounter by myself.  Anjel Tao M.D. - NPI: [id number]

## 2023-03-20 ENCOUNTER — PATIENT OUTREACH (OUTPATIENT)
Dept: MEDICAL GROUP | Facility: MEDICAL CENTER | Age: 88
End: 2023-03-20
Payer: MEDICARE

## 2023-03-20 NOTE — PROGRESS NOTES
Attempt #1 3/20  Attempt #2 3/21    Home health choice -[facility].    FWW -request sent to [facility]. Requested RN get FWW from [employer].      1025 -  RN messaged , requesting a letter from hospital for patient's admission and discharge for patient's daughter's. CM faxed letter to RN to give to family.      1100 - Choice for [facility] for Home Oxygen. 1245 - CM called [facility] to check on delivery. Spoke with On call office, they are reaching out to [facility] to get oxygen delivered. RN made aware

## 2023-03-21 NOTE — PROGRESS NOTES
This patient qualifies for a Transitional Care Management visit as they are being seen within the required time and two attempts were made to contact this patient within two business days to review discharge per CMS guidelines.  You therefore can see them as a TCM visit and charge appropriately.    Coding guide  61017      -face-to-face within 14 days      -moderate medical decision complexity  99276      -face-to-face within 7 days      -high medical decision complexity

## 2023-03-22 ENCOUNTER — HOME CARE VISIT (OUTPATIENT)
Dept: HOME HEALTH SERVICES | Facility: HOME HEALTHCARE | Age: 88
End: 2023-03-22

## 2023-03-22 LAB
BACTERIA BLD CULT: NORMAL
BACTERIA BLD CULT: NORMAL
SIGNIFICANT IND 70042: NORMAL
SIGNIFICANT IND 70042: NORMAL
SITE SITE: NORMAL
SITE SITE: NORMAL
SOURCE SOURCE: NORMAL
SOURCE SOURCE: NORMAL

## 2023-03-23 ENCOUNTER — OFFICE VISIT (OUTPATIENT)
Dept: MEDICAL GROUP | Facility: MEDICAL CENTER | Age: 88
End: 2023-03-23
Payer: MEDICARE

## 2023-03-23 ENCOUNTER — HOSPITAL ENCOUNTER (OUTPATIENT)
Dept: LAB | Facility: MEDICAL CENTER | Age: 88
End: 2023-03-23
Attending: FAMILY MEDICINE
Payer: MEDICARE

## 2023-03-23 VITALS
DIASTOLIC BLOOD PRESSURE: 72 MMHG | SYSTOLIC BLOOD PRESSURE: 118 MMHG | WEIGHT: 149 LBS | HEIGHT: 64 IN | BODY MASS INDEX: 25.44 KG/M2 | HEART RATE: 84 BPM | TEMPERATURE: 97.6 F | OXYGEN SATURATION: 95 % | RESPIRATION RATE: 16 BRPM

## 2023-03-23 DIAGNOSIS — R73.9 HYPERGLYCEMIA: ICD-10-CM

## 2023-03-23 DIAGNOSIS — J96.01 ACUTE RESPIRATORY FAILURE WITH HYPOXIA (HCC): ICD-10-CM

## 2023-03-23 DIAGNOSIS — I51.89 DIASTOLIC DYSFUNCTION: ICD-10-CM

## 2023-03-23 DIAGNOSIS — E78.5 DYSLIPIDEMIA: ICD-10-CM

## 2023-03-23 DIAGNOSIS — G62.9 NEUROPATHY: ICD-10-CM

## 2023-03-23 DIAGNOSIS — D68.69 SECONDARY HYPERCOAGULABLE STATE (HCC): ICD-10-CM

## 2023-03-23 DIAGNOSIS — M17.0 PRIMARY OSTEOARTHRITIS OF BOTH KNEES: ICD-10-CM

## 2023-03-23 DIAGNOSIS — I48.91 ATRIAL FIBRILLATION, UNSPECIFIED TYPE (HCC): ICD-10-CM

## 2023-03-23 DIAGNOSIS — I50.33 ACUTE ON CHRONIC HEART FAILURE WITH PRESERVED EJECTION FRACTION (HCC): ICD-10-CM

## 2023-03-23 DIAGNOSIS — R19.7 DIARRHEA, UNSPECIFIED TYPE: ICD-10-CM

## 2023-03-23 DIAGNOSIS — R94.4 DECREASED GFR: ICD-10-CM

## 2023-03-23 PROBLEM — I50.9 ACUTE HEART FAILURE (HCC): Status: RESOLVED | Noted: 2023-03-17 | Resolved: 2023-03-23

## 2023-03-23 PROBLEM — J81.0 ACUTE PULMONARY EDEMA (HCC): Status: RESOLVED | Noted: 2023-03-17 | Resolved: 2023-03-23

## 2023-03-23 PROBLEM — I16.0 HYPERTENSIVE URGENCY: Status: RESOLVED | Noted: 2018-08-14 | Resolved: 2023-03-23

## 2023-03-23 PROBLEM — R74.8 ELEVATED LIVER ENZYMES: Status: RESOLVED | Noted: 2023-03-17 | Resolved: 2023-03-23

## 2023-03-23 LAB
ALBUMIN SERPL BCP-MCNC: 3.8 G/DL (ref 3.2–4.9)
ALBUMIN/GLOB SERPL: 0.9 G/DL
ALP SERPL-CCNC: 128 U/L (ref 30–99)
ALT SERPL-CCNC: 26 U/L (ref 2–50)
ANION GAP SERPL CALC-SCNC: 12 MMOL/L (ref 7–16)
AST SERPL-CCNC: 25 U/L (ref 12–45)
BILIRUB SERPL-MCNC: 0.3 MG/DL (ref 0.1–1.5)
BUN SERPL-MCNC: 41 MG/DL (ref 8–22)
CALCIUM ALBUM COR SERPL-MCNC: 10.4 MG/DL (ref 8.5–10.5)
CALCIUM SERPL-MCNC: 10.2 MG/DL (ref 8.5–10.5)
CHLORIDE SERPL-SCNC: 100 MMOL/L (ref 96–112)
CO2 SERPL-SCNC: 23 MMOL/L (ref 20–33)
CREAT SERPL-MCNC: 0.86 MG/DL (ref 0.5–1.4)
EST. AVERAGE GLUCOSE BLD GHB EST-MCNC: 114 MG/DL
GFR SERPLBLD CREATININE-BSD FMLA CKD-EPI: 64 ML/MIN/1.73 M 2
GLOBULIN SER CALC-MCNC: 4.3 G/DL (ref 1.9–3.5)
GLUCOSE SERPL-MCNC: 110 MG/DL (ref 65–99)
HBA1C MFR BLD: 5.6 % (ref 4–5.6)
POTASSIUM SERPL-SCNC: 4.5 MMOL/L (ref 3.6–5.5)
PROT SERPL-MCNC: 8.1 G/DL (ref 6–8.2)
SODIUM SERPL-SCNC: 135 MMOL/L (ref 135–145)

## 2023-03-23 PROCEDURE — 80053 COMPREHEN METABOLIC PANEL: CPT

## 2023-03-23 PROCEDURE — 99496 TRANSJ CARE MGMT HIGH F2F 7D: CPT | Performed by: FAMILY MEDICINE

## 2023-03-23 PROCEDURE — 84443 ASSAY THYROID STIM HORMONE: CPT

## 2023-03-23 PROCEDURE — 82607 VITAMIN B-12: CPT

## 2023-03-23 PROCEDURE — 83036 HEMOGLOBIN GLYCOSYLATED A1C: CPT | Mod: GA

## 2023-03-23 PROCEDURE — 36415 COLL VENOUS BLD VENIPUNCTURE: CPT

## 2023-03-23 ASSESSMENT — FIBROSIS 4 INDEX: FIB4 SCORE: 2.16

## 2023-03-23 ASSESSMENT — PATIENT HEALTH QUESTIONNAIRE - PHQ9: CLINICAL INTERPRETATION OF PHQ2 SCORE: 0

## 2023-03-23 NOTE — PROGRESS NOTES
Subjective:     Veneracion Reyes Villagracia is a 89 y.o. female presenting with her daughters for a follow up.  Was hospitalized 3/17 - 3/19 for diarrhea, weakness and found to be in acute heart failure, atrial fibrillation with acute respiratory failure.  They report that she drank some coconut juice and had diarrhea.  This was exacerbated with her continuing to take her senna.  Since discharge, she feels well.  She denies any diarrhea.  She denies any lightheadedness, dizziness, shortness of breath, chest pain.  Her leg swelling has significantly improved.  Her oxygen levels have been normal at home.  She has a follow-up appointment with the nephrologist.  They are wondering about a referral to cardiology, is wondering about transitioning to aspirin instead of the Eliquis given her age and falls risk.  They would also like a A1c checked, she describes some pins-and-needles sensation in her hands.  They are also wondering about knee injections.  She is planning to go to the [facility] in June      Review of systems:  See above.       Current Outpatient Medications:     multivitamin Tab, Take 1 Tablet by mouth every day., Disp: , Rfl:     Ascorbic Acid (VITAMIN C PO), Take  by mouth., Disp: , Rfl:     MELATONIN PO, Take  by mouth., Disp: , Rfl:     Misc. Devices Misc, One bedside commode, Disp: 1 Each, Rfl: 0    losartan (COZAAR) 50 MG Tab, Take 1 Tablet by mouth every day., Disp: 30 Tablet, Rfl: 3    metoprolol tartrate (LOPRESSOR) 25 MG Tab, Take 1 Tablet by mouth 2 times a day., Disp: 60 Tablet, Rfl: 3    torsemide (DEMADEX) 5 MG Tab, Take 1 Tablet by mouth every day., Disp: 30 Tablet, Rfl: 3    apixaban (ELIQUIS) 2.5mg Tab, Take 1 Tablet by mouth 2 times a day. Indications: Thromboembolism secondary to Atrial Fibrillation, Disp: 60 Tablet, Rfl: 3    lovastatin (MEVACOR) 20 MG Tab, Take 1 Tablet by mouth every day., Disp: 90 Tablet, Rfl: 3    lidocaine (LIDODERM) 5 % Patch, Place 1 Patch on the skin every 12  "hours for 80 days., Disp: 160 Patch, Rfl: 0    Allergies, past medical history, past surgical history, family history, social history reviewed and updated    Objective:     Vitals: /72   Pulse 84   Temp 36.4 °C (97.6 °F)   Resp 16   Ht 1.626 m (5' 4\")   Wt 67.6 kg (149 lb)   SpO2 95%   BMI 25.58 kg/m²   General: Alert, pleasant, NAD  HEENT: Normocephalic.   Heart: Regular rate and rhythm.  S1 and S2 normal.  No murmurs appreciated.  Respiratory: Normal respiratory effort.  Clear to auscultation bilaterally.  Abdomen: Non-distended, soft  Extremities: No leg edema on the left, mild swelling on the right.    Assessment/Plan:     Veneracion was seen today for hospital follow-up.    Diagnoses and all orders for this visit:    Acute on chronic heart failure with preserved ejection fraction (HCC)  Diastolic dysfunction  Acute, complicated issue.  Hospitalization records reviewed.  We will continue with losartan, metoprolol, torsemide, Eliquis.  Referral to cardiology placed.  FMLA and DMV paperwork filled out  -     Referral to Nutrition Services  -     REFERRAL TO CARDIOLOGY    Atrial fibrillation, unspecified type (HCC)  Secondary hypercoagulable state (HCC)  Acute, complicated issue.  Hospitalization records reviewed.  We will continue with losartan, metoprolol, torsemide, Eliquis.  Referral to cardiology placed.  -     TSH WITH REFLEX TO FT4; Future  -     Comp Metabolic Panel; Future  -     Misc. Devices Misc; One bedside commode  -     Referral to Nutrition Services  -     REFERRAL TO CARDIOLOGY    Diarrhea, unspecified type  Self-limited issue, has resolved    Acute respiratory failure with hypoxia (HCC)  Self-limited issue, has resolved  -     Misc. Devices Misc; One bedside commode    Decreased GFR  Acute, complicated issue.  We discussed rechecking a CMP.  She already has an appointment with nephrology    Primary osteoarthritis of both knees  Chronic, worsening.  Recommended that she follow-up with " orthopedics  -     Misc. Devices Misc; One bedside commode    Dyslipidemia  Chronic, stable, continue lovastatin    Hyperglycemia  Chronic, stable, will check an A1c  -     HEMOGLOBIN A1C; Future    Neuropathy  Acute, uncomplicated issue.  We will check the following labs  -     HEMOGLOBIN A1C; Future  -     VITAMIN B12; Future        Return in about 4 months (around 7/23/2023) for routine follow up.      Face-to-face transitional care management services with HIGH (today's visit is within days post discharge & LACE+ score 59+) medical decision complexity were provided.     LACE+ Historical Score Over Time (0-28: Low, 29-58: Medium, 59+: High): 77      - Hospitalization and results reviewed with patient.   - Medications reviewed including instructions regarding high risk medications, dosing and side effects.  - Recommended Services: No services needed at this time  - Advance directive/POLST on file?  Yes    - Patient was discharged from [facility] on 3/19.  - Chart and discharge summary were reviewed.   - 48- hour post discharge RN call completed on and documented in the medical record by Dipti Carrion RN:    POST DISCHARGE CALL:  Discharge Date:3/19/2023   Date of Outreach Call: 3/21/2023  1:39 PM  Now that you're home, how are you doing? *  Do you have questions about your medications? *    Did you fill your medications? *    Do you have a follow-up appointment scheduled?Yes  Comment:scheduled prior, 20min only    Discharging Department: *    Number of Attempts: 2  Current or previous attempts completed within two business days of discharge? Yes  Provided education regarding treatment plan, medication, self-management, ADLs? *  Has patient completed Advance Directive? If yes, advise them to bring to appointment. *  Care Manager phone number provided? *  Is there anything else I can help you with? *

## 2023-03-24 PROBLEM — R94.4 DECREASED GFR: Status: RESOLVED | Noted: 2023-03-23 | Resolved: 2023-03-24

## 2023-03-24 LAB
TSH SERPL DL<=0.005 MIU/L-ACNC: 1.19 UIU/ML (ref 0.38–5.33)
VIT B12 SERPL-MCNC: 1377 PG/ML (ref 211–911)

## 2023-03-28 ENCOUNTER — TELEPHONE (OUTPATIENT)
Dept: HEALTH INFORMATION MANAGEMENT | Facility: OTHER | Age: 88
End: 2023-03-28
Payer: MEDICARE

## 2023-03-30 ENCOUNTER — PATIENT MESSAGE (OUTPATIENT)
Dept: HEALTH INFORMATION MANAGEMENT | Facility: OTHER | Age: 88
End: 2023-03-30

## 2023-03-30 ENCOUNTER — PATIENT OUTREACH (OUTPATIENT)
Dept: HEALTH INFORMATION MANAGEMENT | Facility: OTHER | Age: 88
End: 2023-03-30

## 2023-03-30 NOTE — PROGRESS NOTES
CHMICHAEL Ortiz called the pt to introduce the CCM program to the pt. This CHW spoke with the pt daughter Ashli (HIPAA approved) and the daughter declined the program at this time. This CHW will send the [place] message in case they change their mind in the future.

## 2023-04-11 ENCOUNTER — PATIENT MESSAGE (OUTPATIENT)
Dept: MEDICAL GROUP | Facility: MEDICAL CENTER | Age: 88
End: 2023-04-11

## 2023-04-11 DIAGNOSIS — I50.9 ACUTE CONGESTIVE HEART FAILURE, UNSPECIFIED HEART FAILURE TYPE (HCC): ICD-10-CM

## 2023-04-11 DIAGNOSIS — I48.91 ATRIAL FIBRILLATION, UNSPECIFIED TYPE (HCC): ICD-10-CM

## 2023-04-11 RX ORDER — TORSEMIDE 5 MG/1
5 TABLET ORAL DAILY
Qty: 90 TABLET | Refills: 1 | Status: SHIPPED | OUTPATIENT
Start: 2023-04-11 | End: 2023-04-12 | Stop reason: SDUPTHER

## 2023-04-11 RX ORDER — LOSARTAN POTASSIUM 50 MG/1
50 TABLET ORAL DAILY
Qty: 90 TABLET | Refills: 1 | Status: SHIPPED | OUTPATIENT
Start: 2023-04-11 | End: 2023-04-13 | Stop reason: SDUPTHER

## 2023-04-12 DIAGNOSIS — I50.9 ACUTE CONGESTIVE HEART FAILURE, UNSPECIFIED HEART FAILURE TYPE (HCC): ICD-10-CM

## 2023-04-13 DIAGNOSIS — I10 ESSENTIAL HYPERTENSION: ICD-10-CM

## 2023-04-13 DIAGNOSIS — I50.9 ACUTE CONGESTIVE HEART FAILURE, UNSPECIFIED HEART FAILURE TYPE (HCC): ICD-10-CM

## 2023-04-13 RX ORDER — LOSARTAN POTASSIUM 50 MG/1
50 TABLET ORAL DAILY
Qty: 90 TABLET | Refills: 1 | Status: SHIPPED | OUTPATIENT
Start: 2023-04-13 | End: 2024-01-16 | Stop reason: SDUPTHER

## 2023-04-13 RX ORDER — TORSEMIDE 5 MG/1
5 TABLET ORAL DAILY
Qty: 90 TABLET | Refills: 1 | Status: SHIPPED | OUTPATIENT
Start: 2023-04-13 | End: 2024-01-16 | Stop reason: SDUPTHER

## 2023-04-13 RX ORDER — LOSARTAN POTASSIUM 100 MG/1
100 TABLET ORAL DAILY
Qty: 90 TABLET | Refills: 1 | OUTPATIENT
Start: 2023-04-13

## 2023-04-26 ENCOUNTER — TELEPHONE (OUTPATIENT)
Dept: CARDIOLOGY | Facility: MEDICAL CENTER | Age: 88
End: 2023-04-26

## 2023-04-26 NOTE — TELEPHONE ENCOUNTER
Caller: Ashli Go    Topic/issue: Referral to Hubert Steinberg 3/23/23 .    Callback Number: [phone]     Thank you   Jen SCHNEIDER

## 2023-04-27 NOTE — TELEPHONE ENCOUNTER
Shannon Steinberg M.D.; Apurva Cross R.N. 1 hour ago (9:26 AM)     KIESHA FRIAS for Pt to C/B and schedule, said to ask for Kimmy.     Thanks!

## 2023-04-29 ENCOUNTER — HOSPITAL ENCOUNTER (OUTPATIENT)
Dept: LAB | Facility: MEDICAL CENTER | Age: 88
End: 2023-04-29
Attending: INTERNAL MEDICINE
Payer: MEDICARE

## 2023-04-29 LAB
25(OH)D3 SERPL-MCNC: 40 NG/ML (ref 30–100)
ALBUMIN SERPL BCP-MCNC: 4 G/DL (ref 3.2–4.9)
ALBUMIN/GLOB SERPL: 1.1 G/DL
ALP SERPL-CCNC: 99 U/L (ref 30–99)
ALT SERPL-CCNC: 14 U/L (ref 2–50)
ANION GAP SERPL CALC-SCNC: 12 MMOL/L (ref 7–16)
APPEARANCE UR: CLEAR
AST SERPL-CCNC: 24 U/L (ref 12–45)
BACTERIA #/AREA URNS HPF: NEGATIVE /HPF
BASOPHILS # BLD AUTO: 0.5 % (ref 0–1.8)
BASOPHILS # BLD: 0.04 K/UL (ref 0–0.12)
BILIRUB SERPL-MCNC: 0.5 MG/DL (ref 0.1–1.5)
BILIRUB UR QL STRIP.AUTO: NEGATIVE
BUN SERPL-MCNC: 26 MG/DL (ref 8–22)
CA-I SERPL-SCNC: 1.3 MMOL/L (ref 1.1–1.3)
CALCIUM ALBUM COR SERPL-MCNC: 9.7 MG/DL (ref 8.5–10.5)
CALCIUM SERPL-MCNC: 9.7 MG/DL (ref 8.5–10.5)
CHLORIDE SERPL-SCNC: 102 MMOL/L (ref 96–112)
CO2 SERPL-SCNC: 20 MMOL/L (ref 20–33)
COLOR UR: YELLOW
CREAT SERPL-MCNC: 1.02 MG/DL (ref 0.5–1.4)
CREAT UR-MCNC: 19.96 MG/DL
EOSINOPHIL # BLD AUTO: 0.13 K/UL (ref 0–0.51)
EOSINOPHIL NFR BLD: 1.7 % (ref 0–6.9)
EPI CELLS #/AREA URNS HPF: NORMAL /HPF
ERYTHROCYTE [DISTWIDTH] IN BLOOD BY AUTOMATED COUNT: 49.2 FL (ref 35.9–50)
FERRITIN SERPL-MCNC: 646 NG/ML (ref 10–291)
GFR SERPLBLD CREATININE-BSD FMLA CKD-EPI: 52 ML/MIN/1.73 M 2
GLOBULIN SER CALC-MCNC: 3.5 G/DL (ref 1.9–3.5)
GLUCOSE SERPL-MCNC: 98 MG/DL (ref 65–99)
GLUCOSE UR STRIP.AUTO-MCNC: NEGATIVE MG/DL
HCT VFR BLD AUTO: 38.8 % (ref 37–47)
HGB BLD-MCNC: 12.8 G/DL (ref 12–16)
HYALINE CASTS #/AREA URNS LPF: NORMAL /LPF
IMM GRANULOCYTES # BLD AUTO: 0.02 K/UL (ref 0–0.11)
IMM GRANULOCYTES NFR BLD AUTO: 0.3 % (ref 0–0.9)
IRON SATN MFR SERPL: 38 % (ref 15–55)
IRON SERPL-MCNC: 115 UG/DL (ref 40–170)
KETONES UR STRIP.AUTO-MCNC: NEGATIVE MG/DL
LEUKOCYTE ESTERASE UR QL STRIP.AUTO: ABNORMAL
LYMPHOCYTES # BLD AUTO: 3.22 K/UL (ref 1–4.8)
LYMPHOCYTES NFR BLD: 41.3 % (ref 22–41)
MAGNESIUM SERPL-MCNC: 2.2 MG/DL (ref 1.5–2.5)
MCH RBC QN AUTO: 33.4 PG (ref 27–33)
MCHC RBC AUTO-ENTMCNC: 33 G/DL (ref 33.6–35)
MCV RBC AUTO: 101.3 FL (ref 81.4–97.8)
MICRO URNS: ABNORMAL
MONOCYTES # BLD AUTO: 0.9 K/UL (ref 0–0.85)
MONOCYTES NFR BLD AUTO: 11.5 % (ref 0–13.4)
NEUTROPHILS # BLD AUTO: 3.49 K/UL (ref 2–7.15)
NEUTROPHILS NFR BLD: 44.7 % (ref 44–72)
NITRITE UR QL STRIP.AUTO: NEGATIVE
NRBC # BLD AUTO: 0 K/UL
NRBC BLD-RTO: 0 /100 WBC
PH UR STRIP.AUTO: 6.5 [PH] (ref 5–8)
PHOSPHATE SERPL-MCNC: 3.5 MG/DL (ref 2.5–4.5)
PLATELET # BLD AUTO: 194 K/UL (ref 164–446)
PMV BLD AUTO: 11.7 FL (ref 9–12.9)
POTASSIUM SERPL-SCNC: 4.5 MMOL/L (ref 3.6–5.5)
PROT SERPL-MCNC: 7.5 G/DL (ref 6–8.2)
PROT UR QL STRIP: NEGATIVE MG/DL
PROT UR-MCNC: <4 MG/DL (ref 0–15)
PROT/CREAT UR: NORMAL MG/G (ref 10–107)
PTH-INTACT SERPL-MCNC: 86.2 PG/ML (ref 14–72)
RBC # BLD AUTO: 3.83 M/UL (ref 4.2–5.4)
RBC # URNS HPF: NORMAL /HPF
RBC UR QL AUTO: NEGATIVE
SODIUM SERPL-SCNC: 134 MMOL/L (ref 135–145)
SP GR UR STRIP.AUTO: 1.01
TIBC SERPL-MCNC: 299 UG/DL (ref 250–450)
UIBC SERPL-MCNC: 184 UG/DL (ref 110–370)
URATE SERPL-MCNC: 7.8 MG/DL (ref 1.9–8.2)
UROBILINOGEN UR STRIP.AUTO-MCNC: 0.2 MG/DL
WBC # BLD AUTO: 7.8 K/UL (ref 4.8–10.8)
WBC #/AREA URNS HPF: NORMAL /HPF

## 2023-04-29 PROCEDURE — 83550 IRON BINDING TEST: CPT

## 2023-04-29 PROCEDURE — 83540 ASSAY OF IRON: CPT

## 2023-04-29 PROCEDURE — 84550 ASSAY OF BLOOD/URIC ACID: CPT

## 2023-04-29 PROCEDURE — 83521 IG LIGHT CHAINS FREE EACH: CPT

## 2023-04-29 PROCEDURE — 84100 ASSAY OF PHOSPHORUS: CPT

## 2023-04-29 PROCEDURE — 82306 VITAMIN D 25 HYDROXY: CPT

## 2023-04-29 PROCEDURE — 86335 IMMUNFIX E-PHORSIS/URINE/CSF: CPT

## 2023-04-29 PROCEDURE — 81001 URINALYSIS AUTO W/SCOPE: CPT

## 2023-04-29 PROCEDURE — 83970 ASSAY OF PARATHORMONE: CPT

## 2023-04-29 PROCEDURE — 82570 ASSAY OF URINE CREATININE: CPT

## 2023-04-29 PROCEDURE — 84165 PROTEIN E-PHORESIS SERUM: CPT

## 2023-04-29 PROCEDURE — 86334 IMMUNOFIX E-PHORESIS SERUM: CPT

## 2023-04-29 PROCEDURE — 80053 COMPREHEN METABOLIC PANEL: CPT

## 2023-04-29 PROCEDURE — 84166 PROTEIN E-PHORESIS/URINE/CSF: CPT

## 2023-04-29 PROCEDURE — 85025 COMPLETE CBC W/AUTO DIFF WBC: CPT

## 2023-04-29 PROCEDURE — 36415 COLL VENOUS BLD VENIPUNCTURE: CPT

## 2023-04-29 PROCEDURE — 84156 ASSAY OF PROTEIN URINE: CPT

## 2023-04-29 PROCEDURE — 82728 ASSAY OF FERRITIN: CPT

## 2023-04-29 PROCEDURE — 84155 ASSAY OF PROTEIN SERUM: CPT | Mod: XU

## 2023-04-29 PROCEDURE — 83735 ASSAY OF MAGNESIUM: CPT

## 2023-04-29 PROCEDURE — 82330 ASSAY OF CALCIUM: CPT

## 2023-05-01 LAB
KAPPA LC FREE SER-MCNC: 35.33 MG/L (ref 3.3–19.4)
KAPPA LC FREE/LAMBDA FREE SER NEPH: 1.05 {RATIO} (ref 0.26–1.65)
LAMBDA LC FREE SERPL-MCNC: 33.56 MG/L (ref 5.71–26.3)

## 2023-05-02 LAB
ALBUMIN SERPL ELPH-MCNC: 4.02 G/DL (ref 3.75–5.01)
ALPHA1 GLOB SERPL ELPH-MCNC: 0.28 G/DL (ref 0.19–0.46)
ALPHA2 GLOB SERPL ELPH-MCNC: 0.73 G/DL (ref 0.48–1.05)
B-GLOBULIN SERPL ELPH-MCNC: 1.09 G/DL (ref 0.48–1.1)
GAMMA GLOB SERPL ELPH-MCNC: 1.29 G/DL (ref 0.62–1.51)
INTERPRETATION SERPL IFE-IMP: NORMAL
INTERPRETATION SERPL IFE-IMP: NORMAL
MONOCLONAL PROTEIN NL11656: NORMAL G/DL
PATHOLOGY STUDY: NORMAL
PROT SERPL-MCNC: 7.4 G/DL (ref 6.3–8.2)

## 2023-05-04 LAB
ALBUMIN 24H MFR UR ELPH: 64.4 %
ALPHA1 GLOB 24H MFR UR ELPH: 4.5 %
ALPHA2 GLOB 24H MFR UR ELPH: 6.9 %
B-GLOBULIN 24H MFR UR ELPH: 11.8 %
COLLECT DURATION TIME SPEC: NORMAL HRS
EER MONOCLONAL PROTEIN STUDY, 24 HOUR U Q5964: NORMAL
GAMMA GLOB 24H MFR UR ELPH: 12.4 %
INTERPRETATION UR IFE-IMP: NORMAL
M PROTEIN 24H MFR UR ELPH: 0 %
M PROTEIN 24H UR ELPH-MRATE: NORMAL MG/24 HRS
PROT 24H UR-MRATE: NORMAL MG/D (ref 40–150)
PROT UR-MCNC: <4 MG/DL
SPECIMEN VOL ?TM UR: NORMAL ML

## 2023-05-31 ENCOUNTER — TELEPHONE (OUTPATIENT)
Dept: CARDIOLOGY | Facility: MEDICAL CENTER | Age: 88
End: 2023-05-31
Payer: MEDICARE

## 2023-06-14 ENCOUNTER — OFFICE VISIT (OUTPATIENT)
Dept: CARDIOLOGY | Facility: MEDICAL CENTER | Age: 88
End: 2023-06-14
Attending: FAMILY MEDICINE
Payer: MEDICARE

## 2023-06-14 VITALS
OXYGEN SATURATION: 98 % | SYSTOLIC BLOOD PRESSURE: 108 MMHG | HEART RATE: 64 BPM | HEIGHT: 64 IN | DIASTOLIC BLOOD PRESSURE: 66 MMHG | RESPIRATION RATE: 14 BRPM | BODY MASS INDEX: 24.59 KG/M2 | WEIGHT: 144 LBS

## 2023-06-14 DIAGNOSIS — I50.32 CHRONIC DIASTOLIC HEART FAILURE (HCC): ICD-10-CM

## 2023-06-14 DIAGNOSIS — I48.91 ATRIAL FIBRILLATION, UNSPECIFIED TYPE (HCC): ICD-10-CM

## 2023-06-14 LAB — EKG IMPRESSION: NORMAL

## 2023-06-14 PROCEDURE — 3078F DIAST BP <80 MM HG: CPT | Performed by: INTERNAL MEDICINE

## 2023-06-14 PROCEDURE — 99204 OFFICE O/P NEW MOD 45 MIN: CPT | Performed by: INTERNAL MEDICINE

## 2023-06-14 PROCEDURE — 99214 OFFICE O/P EST MOD 30 MIN: CPT | Performed by: INTERNAL MEDICINE

## 2023-06-14 PROCEDURE — 93005 ELECTROCARDIOGRAM TRACING: CPT | Performed by: INTERNAL MEDICINE

## 2023-06-14 PROCEDURE — 3074F SYST BP LT 130 MM HG: CPT | Performed by: INTERNAL MEDICINE

## 2023-06-14 PROCEDURE — 93010 ELECTROCARDIOGRAM REPORT: CPT | Performed by: INTERNAL MEDICINE

## 2023-06-14 RX ORDER — METOPROLOL SUCCINATE 25 MG/1
25 TABLET, EXTENDED RELEASE ORAL DAILY
Qty: 90 TABLET | Refills: 3 | Status: SHIPPED | OUTPATIENT
Start: 2023-06-14 | End: 2023-08-25 | Stop reason: SDUPTHER

## 2023-06-14 ASSESSMENT — MINNESOTA LIVING WITH HEART FAILURE QUESTIONNAIRE (MLHF)
DIFFICULTY WITH RECREATIONAL PASTIMES, SPORTS, HOBBIES: 3
FEELING LIKE A BURDEN TO FAMILY AND FRIENDS: 2
DIFFICULTY TO CONCENTRATE OR REMEMBERING THINGS: 3
DIFFICULTY WITH SEXUAL ACTIVITIES: 0
TIRED, FATIGUED OR LOW ON ENERGY: 3
MAKING YOU WORRY: 3
EATING LESS FOODS YOU LIKE: 5
MAKING YOU FEEL DEPRESSED: 1
TOTAL_SCORE: 50
DIFFICULTY GOING AWAY FROM HOME: 2
DIFFICULTY SOCIALIZING WITH FAMILY OR FRIENDS: 3
DIFFICULTY SLEEPING WELL AT NIGHT: 4
GIVING YOU SIDE EFFECTS FROM TREATMENTS: 2
MAKING YOU SHORT OF BREATH: 2
LOSS OF SELF CONTROL IN YOUR LIFE: 4
WALKING ABOUT OR CLIMBING STAIRS DIFFICULT: 2
WORKING AROUND THE HOUSE OR YARD DIFFICULT: 2
COSTING YOU MONEY FOR MEDICAL CARE: 2
MAKING YOU STAY IN A HOSPITAL: 0
SWELLING IN ANKLES OR LEGS: 1
DIFFICULTY WORKING TO EARN A LIVING: 5
HAVING TO SIT OR LIE DOWN DURING THE DAY: 1

## 2023-06-14 ASSESSMENT — FIBROSIS 4 INDEX: FIB4 SCORE: 3.01

## 2023-06-14 NOTE — ASSESSMENT & PLAN NOTE
Her atrial fibrillation is paroxysmal given that her EKG does not demonstrate any acute ongoing atrial fibrillation.  At this time her [id number] score is 4 making anticoagulation appropriate.  At this time we will increase her Eliquis 5 mg twice daily given that she does not meet significant criteria to be on reduced Eliquis therapy.  After a careful discussion with the patient and her daughter it was decided for her to continue with Eliquis therapy given that she remains stable utilizing her rolling walker and has not had any recent falls that would preclude her from continuing ongoing anticoagulation for stroke risk reduction

## 2023-06-14 NOTE — PATIENT INSTRUCTIONS
Follow up in 3 months with Dr. Self  Increase Eliquis 5mg twice per day  Changed Metoprolol 25mg daily  Continue current medications  Call with questions

## 2023-06-14 NOTE — PROGRESS NOTES
[facility]    PatientName:Veneracion Reyes VillagraciaDate: 2023  :1932    89 y.o.PCP:Yogesh Torres M.D.  MRN:[id number]          Problems and Plans    Chronic diastolic heart failure (HCC)  Clinically, she is doing well and appears to be euvolemic on physical examination.  Regarding her diastolic heart failure at this time focus is on quality of life and she remains on appropriate medical therapy for her diastolic heart failure.  She is on very minimal dose of torsemide therapy and is tolerating her losartan 50 mg daily coupled to transitioning to Toprol 25 mg daily.  This may help with some of her fatigue complaints.  I would not recommend starting Jardiance therapy given that she is fairly sedentary in her overall advanced age with concern for possible hypoglycemia.    AF (atrial fibrillation) (HCC)  Her atrial fibrillation is paroxysmal given that her EKG does not demonstrate any acute ongoing atrial fibrillation.  At this time her [id number] score is 4 making anticoagulation appropriate.  At this time we will increase her Eliquis 5 mg twice daily given that she does not meet significant criteria to be on reduced Eliquis therapy.  After a careful discussion with the patient and her daughter it was decided for her to continue with Eliquis therapy given that she remains stable utilizing her rolling walker and has not had any recent falls that would preclude her from continuing ongoing anticoagulation for stroke risk reduction    Return in about 3 months (around 2023).      Encounter    Reason for Visit / Chief Complaint: Atrial fibrillation and new onset diastolic heart failure    HPI    89-year-old female with known history of atrial fibrillation on reduced dose Eliquis, aortic valve insufficiency, tricuspid valve insufficiency, hypertension, dyslipidemia, osteoarthritis presents in consultation for evaluation of arrhythmia.    Currently, she notes  she is feeling well and not having active cardiovascular complaints.  She notes being sedentary but largely enjoying watching TV.  She denies any chest pain, edema, decreased appetite, orthopnea, exertional dyspnea.  She is accompanied by her daughter who reiterates similar history and also states that her mother is doing well.  Her daughter's clinical concerns are ongoing anticoagulation for stroke risk reduction given concern for possible gait instability.  She questions if aspirin would be a reasonable alternative to Eliquis therapy.  She is not witnessed any falls by her mother.    Patient was hospitalized back in mid March 2023 with complaints of diarrhea and abdominal discomfort.  During that hospitalization she was subsequently diagnosed with atrial fibrillation and started on reduced dose Eliquis 2.5 mg twice daily in addition to pursuing a rate control strategy.    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    Heart burn     High cholesterol     Hypercholesteremia     Hypertension     pt states controlled on meds    Urinary tract infection 08/2019    currently on antibiotics for uti     Past Surgical History  Past Surgical History:   Procedure Laterality Date    ANTERIOR AND POSTERIOR REPAIR N/A 9/3/2019    Procedure: COLPORRHAPHY, COMBINED ANTEROPOSTERIOR - W/PERINEOPLASTY;  Surgeon: Jim Boykin M.D.;  Location: SURGERY SAME DAY [facility];   Service: Gynecology    ENTEROCELE REPAIR N/A 9/3/2019    Procedure: REPAIR, ENTEROCELE;  Surgeon: Jim Boykin M.D.;  Location: SURGERY SAME DAY [facility];  Service: Gynecology    BLADDER SLING FEMALE N/A 9/3/2019    Procedure: BLADDER SLING, FEMALE - TOT;  Surgeon: Jim Boykin M.D.;  Location: SURGERY SAME DAY [facility];  Service: Gynecology    VAGINAL SUSPENSION N/A 9/3/2019    Procedure: COLPOPEXY - SACROSPINOUS VAULT SUSPENSION;  Surgeon: Jim Boykin M.D.;  Location: SURGERY SAME DAY [facility];  Service: Gynecology    BREAST BIOPSY Left 6/14/2018    Procedure: BREAST BIOPSY - EXCISION MASS;  Surgeon: Kacie Lizama M.D.;  Location: SURGERY [facility];  Service: General    OTHER      Yony cataracts     Social History  Social History     Socioeconomic History    Marital status:      Spouse name: Not on file    Number of children: Not on file    Years of education: Not on file    Highest education level: Not on file   Occupational History    Not on file   Tobacco Use    Smoking status: Never    Smokeless tobacco: Never   Vaping Use    Vaping Use: Never used   Substance and Sexual Activity    Alcohol use: No    Drug use: No    Sexual activity: Never     Partners: Male   Other Topics Concern    Not on file   Social History Narrative    Not on file     Social Determinants of Health     Financial Resource Strain: Not on file   Food Insecurity: Not on file   Transportation Needs: Not on file   Physical Activity: Not on file   Stress: Not on file   Social Connections: Not on file   Intimate Partner Violence: Not on file   Housing Stability: Not on file     Past Family History  Family History   Problem Relation Age of Onset    Stroke Father     Breast Cancer Sister     Alcohol abuse Brother      Medication(s)  [unfilled]  Allergies  Penicillins    Review of Systems    A comprehensive 10 system review was conducted and is negative except as noted above in the HPI or  "here.      Vital Signs  /66 (BP Location: Left arm, Patient Position: Sitting, BP Cuff Size: Adult)   Pulse 64   Resp 14   Ht 1.626 m (5' 4\")   Wt 65.3 kg (144 lb)   SpO2 98%   BMI 24.72 kg/m²     Physical Exam  Vitals and nursing note reviewed.   Constitutional:       Appearance: Normal appearance.   HENT:      Head: Normocephalic and atraumatic.      Nose: Nose normal.      Mouth/Throat:      Mouth: Mucous membranes are moist.      Pharynx: Oropharynx is clear.   Eyes:      Pupils: Pupils are equal, round, and reactive to light.   Cardiovascular:      Rate and Rhythm: Normal rate and regular rhythm.      Pulses: Normal pulses.      Heart sounds: Normal heart sounds. No murmur heard.     No friction rub. No gallop.   Pulmonary:      Effort: Pulmonary effort is normal.      Breath sounds: Normal breath sounds.   Abdominal:      General: Bowel sounds are normal.      Palpations: Abdomen is soft.   Musculoskeletal:         General: Normal range of motion.      Cervical back: Normal range of motion and neck supple.   Skin:     General: Skin is warm and dry.   Neurological:      General: No focal deficit present.      Mental Status: She is alert and oriented to person, place, and time. Mental status is at baseline.   Psychiatric:         Mood and Affect: Mood normal.         Behavior: Behavior normal.         Thought Content: Thought content normal.         Judgment: Judgment normal.         Lab Results   Component Value Date/Time    TSHULTRASEN 1.190 03/23/2023 1658      No results found for: FREET4     Lab Results   Component Value Date/Time    HBA1C 5.6 03/23/2023 04:58 PM       Lab Results   Component Value Date/Time    CHOLSTRLTOT 120 03/18/2023 04:31 AM    LDL 44 03/18/2023 04:31 AM    HDL 53 03/18/2023 04:31 AM    TRIGLYCERIDE 117 03/18/2023 04:31 AM         Lab Results   Component Value Date/Time    SODIUM 134 (L) 04/29/2023 12:31 PM    POTASSIUM 4.5 04/29/2023 12:31 PM    CHLORIDE 102 04/29/2023 " 12:31 PM    CO2 20 04/29/2023 12:31 PM    GLUCOSE 98 04/29/2023 12:31 PM    BUN 26 (H) 04/29/2023 12:31 PM    CREATININE 1.02 04/29/2023 12:31 PM       Lab Results   Component Value Date/Time    ALKPHOSPHAT 99 04/29/2023 12:31 PM    ASTSGOT 24 04/29/2023 12:31 PM    ALTSGPT 14 04/29/2023 12:31 PM    TBILIRUBIN 0.5 04/29/2023 12:31 PM         Imaging  EKG demonstrates sinus rhythm with a first-degree AV block.  I reviewed interpreted EKG.  Findings are discussed with the patient    Echocardiogram  As noted above      Total patient time was estimated to be 45 minutes consisting of chart review, direct patient interaction, medication renewal, plan development and overall communication with the cardiovascular team.        Electronically signed by:   Mao Self DO, MPH  [facility]    Portions of this note were completed using voice recognition software (Dragon Naturally speaking software) . Occasional transcription errors may have escaped proof reading. I have made every reasonable attempt to correct obvious errors, but I expect that there are errors of grammar and possibly content that I did not discover before finalizing the note.

## 2023-06-14 NOTE — ASSESSMENT & PLAN NOTE
Clinically, she is doing well and appears to be euvolemic on physical examination.  Regarding her diastolic heart failure at this time focus is on quality of life and she remains on appropriate medical therapy for her diastolic heart failure.  She is on very minimal dose of torsemide therapy and is tolerating her losartan 50 mg daily coupled to transitioning to Toprol 25 mg daily.  This may help with some of her fatigue complaints.  I would not recommend starting Jardiance therapy given that she is fairly sedentary in her overall advanced age with concern for possible hypoglycemia.

## 2023-06-23 RX ORDER — LOVASTATIN 20 MG/1
20 TABLET ORAL DAILY
Qty: 90 TABLET | Refills: 3 | Status: SHIPPED | OUTPATIENT
Start: 2023-06-23

## 2023-08-25 ENCOUNTER — PATIENT MESSAGE (OUTPATIENT)
Dept: MEDICAL GROUP | Facility: MEDICAL CENTER | Age: 88
End: 2023-08-25
Payer: MEDICARE

## 2023-08-25 DIAGNOSIS — I48.91 ATRIAL FIBRILLATION, UNSPECIFIED TYPE (HCC): ICD-10-CM

## 2023-08-28 RX ORDER — METOPROLOL SUCCINATE 25 MG/1
25 TABLET, EXTENDED RELEASE ORAL DAILY
Qty: 90 TABLET | Refills: 3 | Status: SHIPPED | OUTPATIENT
Start: 2023-08-28 | End: 2023-09-14 | Stop reason: SDUPTHER

## 2023-09-03 ENCOUNTER — HOSPITAL ENCOUNTER (EMERGENCY)
Facility: MEDICAL CENTER | Age: 88
End: 2023-09-03
Attending: EMERGENCY MEDICINE
Payer: MEDICARE

## 2023-09-03 ENCOUNTER — APPOINTMENT (OUTPATIENT)
Dept: RADIOLOGY | Facility: MEDICAL CENTER | Age: 88
End: 2023-09-03
Attending: EMERGENCY MEDICINE
Payer: MEDICARE

## 2023-09-03 VITALS
WEIGHT: 148 LBS | RESPIRATION RATE: 17 BRPM | BODY MASS INDEX: 25.27 KG/M2 | OXYGEN SATURATION: 93 % | HEART RATE: 59 BPM | SYSTOLIC BLOOD PRESSURE: 142 MMHG | DIASTOLIC BLOOD PRESSURE: 65 MMHG | HEIGHT: 64 IN | TEMPERATURE: 98 F

## 2023-09-03 DIAGNOSIS — E87.1 HYPONATREMIA: ICD-10-CM

## 2023-09-03 DIAGNOSIS — I15.9 SECONDARY HYPERTENSION: ICD-10-CM

## 2023-09-03 DIAGNOSIS — G43.809 OTHER MIGRAINE WITHOUT STATUS MIGRAINOSUS, NOT INTRACTABLE: ICD-10-CM

## 2023-09-03 LAB
ALBUMIN SERPL BCP-MCNC: 4.2 G/DL (ref 3.2–4.9)
ALBUMIN/GLOB SERPL: 1.3 G/DL
ALP SERPL-CCNC: 101 U/L (ref 30–99)
ALT SERPL-CCNC: 9 U/L (ref 2–50)
ANION GAP SERPL CALC-SCNC: 12 MMOL/L (ref 7–16)
AST SERPL-CCNC: 21 U/L (ref 12–45)
BASOPHILS # BLD AUTO: 0.5 % (ref 0–1.8)
BASOPHILS # BLD: 0.04 K/UL (ref 0–0.12)
BILIRUB SERPL-MCNC: 0.7 MG/DL (ref 0.1–1.5)
BUN SERPL-MCNC: 20 MG/DL (ref 8–22)
CALCIUM ALBUM COR SERPL-MCNC: 8.8 MG/DL (ref 8.5–10.5)
CALCIUM SERPL-MCNC: 9 MG/DL (ref 8.5–10.5)
CHLORIDE SERPL-SCNC: 96 MMOL/L (ref 96–112)
CO2 SERPL-SCNC: 21 MMOL/L (ref 20–33)
CREAT SERPL-MCNC: 1.04 MG/DL (ref 0.5–1.4)
EKG IMPRESSION: NORMAL
EOSINOPHIL # BLD AUTO: 0.14 K/UL (ref 0–0.51)
EOSINOPHIL NFR BLD: 1.7 % (ref 0–6.9)
ERYTHROCYTE [DISTWIDTH] IN BLOOD BY AUTOMATED COUNT: 44.7 FL (ref 35.9–50)
GFR SERPLBLD CREATININE-BSD FMLA CKD-EPI: 51 ML/MIN/1.73 M 2
GLOBULIN SER CALC-MCNC: 3.2 G/DL (ref 1.9–3.5)
GLUCOSE SERPL-MCNC: 107 MG/DL (ref 65–99)
HCT VFR BLD AUTO: 37.2 % (ref 37–47)
HGB BLD-MCNC: 12.9 G/DL (ref 12–16)
IMM GRANULOCYTES # BLD AUTO: 0.02 K/UL (ref 0–0.11)
IMM GRANULOCYTES NFR BLD AUTO: 0.2 % (ref 0–0.9)
LYMPHOCYTES # BLD AUTO: 2.79 K/UL (ref 1–4.8)
LYMPHOCYTES NFR BLD: 33.9 % (ref 22–41)
MCH RBC QN AUTO: 34.6 PG (ref 27–33)
MCHC RBC AUTO-ENTMCNC: 34.7 G/DL (ref 32.2–35.5)
MCV RBC AUTO: 99.7 FL (ref 81.4–97.8)
MONOCYTES # BLD AUTO: 0.96 K/UL (ref 0–0.85)
MONOCYTES NFR BLD AUTO: 11.7 % (ref 0–13.4)
NEUTROPHILS # BLD AUTO: 4.29 K/UL (ref 1.82–7.42)
NEUTROPHILS NFR BLD: 52 % (ref 44–72)
NRBC # BLD AUTO: 0 K/UL
NRBC BLD-RTO: 0 /100 WBC (ref 0–0.2)
PLATELET # BLD AUTO: 187 K/UL (ref 164–446)
PMV BLD AUTO: 10.1 FL (ref 9–12.9)
POTASSIUM SERPL-SCNC: 4.4 MMOL/L (ref 3.6–5.5)
PROT SERPL-MCNC: 7.4 G/DL (ref 6–8.2)
RBC # BLD AUTO: 3.73 M/UL (ref 4.2–5.4)
SODIUM SERPL-SCNC: 129 MMOL/L (ref 135–145)
TROPONIN T SERPL-MCNC: 16 NG/L (ref 6–19)
WBC # BLD AUTO: 8.2 K/UL (ref 4.8–10.8)

## 2023-09-03 PROCEDURE — 70450 CT HEAD/BRAIN W/O DYE: CPT

## 2023-09-03 PROCEDURE — 93005 ELECTROCARDIOGRAM TRACING: CPT | Performed by: EMERGENCY MEDICINE

## 2023-09-03 PROCEDURE — 700102 HCHG RX REV CODE 250 W/ 637 OVERRIDE(OP): Mod: UD | Performed by: EMERGENCY MEDICINE

## 2023-09-03 PROCEDURE — 99284 EMERGENCY DEPT VISIT MOD MDM: CPT

## 2023-09-03 PROCEDURE — 85025 COMPLETE CBC W/AUTO DIFF WBC: CPT

## 2023-09-03 PROCEDURE — A9270 NON-COVERED ITEM OR SERVICE: HCPCS | Mod: UD | Performed by: EMERGENCY MEDICINE

## 2023-09-03 PROCEDURE — 84484 ASSAY OF TROPONIN QUANT: CPT

## 2023-09-03 PROCEDURE — 80053 COMPREHEN METABOLIC PANEL: CPT

## 2023-09-03 PROCEDURE — 36415 COLL VENOUS BLD VENIPUNCTURE: CPT

## 2023-09-03 RX ORDER — METOPROLOL SUCCINATE 25 MG/1
25 TABLET, EXTENDED RELEASE ORAL ONCE
Status: COMPLETED | OUTPATIENT
Start: 2023-09-03 | End: 2023-09-03

## 2023-09-03 RX ADMIN — METOPROLOL SUCCINATE 25 MG: 25 TABLET, EXTENDED RELEASE ORAL at 07:24

## 2023-09-03 ASSESSMENT — HEART SCORE
AGE: 65+
RISK FACTORS: 1-2 RISK FACTORS
HEART SCORE: 3
ECG: NORMAL
HISTORY: SLIGHTLY SUSPICIOUS
TROPONIN: LESS THAN OR EQUAL TO NORMAL LIMIT

## 2023-09-03 ASSESSMENT — FIBROSIS 4 INDEX: FIB4 SCORE: 3.01

## 2023-09-03 NOTE — ED NOTES
Pt discharge home. Pt given discharge instructions  Pt verbalized understanding, all questions answered ,vss upon d/c. Pt steady on feet upon discharge

## 2023-09-03 NOTE — ED TRIAGE NOTES
Chief Complaint   Patient presents with    Hypertension     Since 1 week ago (SBP> 180 mmHg at night time) despite taking all her medications    Headache     And neck pain for 1 week    Denied any chest pain     She endorsed that morning she had nausea  History of numbness of her head last week    Pain: 5/10    Pt came in to triage ambulatory with steady gait for the above complaints.     Pt is alert and oriented x 4, speaking in full sentences, follows commands and responds appropriately to questions.     Respirations are even and unlabored.    Pt placed in lobby. Pt educated on triage process.     Pt encouraged to inform staff for any changes in condition or if needs help while waiting to be room in.    Vitals:    09/03/23 0631   BP: (!) 166/72   Pulse: 65   Resp: 18   Temp: 36.4 °C (97.6 °F)   SpO2: 96%

## 2023-09-03 NOTE — ED PROVIDER NOTES
ED Provider Note    Scribed for Elyssa Nuñez M.D. by Ana Burris. 9/3/2023, 7:02 AM.    Primary care provider: Yogesh Torres M.D.  Means of arrival: Walk-In  History obtained from: Patient  History limited by: None    CHIEF COMPLAINT  Chief Complaint   Patient presents with    Hypertension     Since 1 week ago (SBP> 180 mmHg at night time) despite taking all her medications    Headache     And neck pain for 1 week    Denied any chest pain       HPI/ROS  Veneracion Reyes Villagracia is a 89 y.o. female who presents to the Emergency Department for hypertension onset one week ago. The patient's daughter assisted in providing history.  The patient's daughter describes that starting last week, at night time patient has been checking her blood pressure and it has been around 180 systolic.  She notes she has been taking all of her medications as prescribed.  Mild throbbing posterior headache whenever her blood pressure is elevated.  She denies any chest pain, blurred vision or flank pain. She denies headache right now. There are no known alleviating or exacerbating factors.  Daughter notes the patient has a follow up appointment with her nephrologist and cardiologist within the next week.  She has a history of chronic kidney disease.  For her blood pressure is the patient's daughter reported that the patient's medication routine is Losartan 50 mg at night and Metoprolol 25 mg in the morning.    EXTERNAL RECORDS REVIEWED  None pertinent    LIMITATION TO HISTORY   Select: : None    OUTSIDE HISTORIAN(S):  Family daughter present in the room and provided most of history of present illness.       PAST MEDICAL HISTORY   has a past medical history of Arthritis, Cataract, Dental disorder, GI bleed, Heart burn, High cholesterol, Hypercholesteremia, Hypertension, and Urinary tract infection (08/2019).    SURGICAL HISTORY   has a past surgical history that includes breast biopsy (Left, 6/14/2018); other; anterior and  "posterior repair (N/A, 9/3/2019); enterocele repair (N/A, 9/3/2019); bladder sling female (N/A, 9/3/2019); and vaginal suspension (N/A, 9/3/2019).    SOCIAL HISTORY  Social History     Tobacco Use    Smoking status: Never    Smokeless tobacco: Never   Vaping Use    Vaping Use: Never used   Substance Use Topics    Alcohol use: No    Drug use: No      Social History     Substance and Sexual Activity   Drug Use No       FAMILY HISTORY  Family History   Problem Relation Age of Onset    Stroke Father     Breast Cancer Sister     Alcohol abuse Brother        CURRENT MEDICATIONS  Home Medications       Reviewed by Yesi Alexandra R.N. (Registered Nurse) on 09/03/23 at 0643  Med List Status: Partial     Medication Last Dose Status   apixaban (ELIQUIS) 5mg Tab  Active   Ascorbic Acid (VITAMIN C PO)  Active   losartan (COZAAR) 50 MG Tab  Active   lovastatin (MEVACOR) 20 MG Tab  Active   MELATONIN PO  Active   metoprolol SR (TOPROL XL) 25 MG TABLET SR 24 HR  Active   Misc. Devices Misc  Active   multivitamin Tab  Active   torsemide (DEMADEX) 5 MG Tab  Active                    ALLERGIES  Allergies   Allergen Reactions    Penicillins Shortness of Breath and Rash     Difficulty breathing rash       PHYSICAL EXAM  VITAL SIGNS: BP (!) 166/72   Pulse 65   Temp 36.4 °C (97.6 °F) (Temporal)   Resp 18   Ht 1.626 m (5' 4\")   Wt 67.1 kg (148 lb)   SpO2 96%   BMI 25.40 kg/m²   Vitals reviewed by myself.  Nursing note and vitals reviewed.  Nursing note and vitals reviewed.  Constitutional: Well-developed and well-nourished. No distress.   HENT: Head is normocephalic and atraumatic. Oropharynx is clear and moist without exudate or erythema.   Eyes: Pupils are equal, round, and reactive to light. No horizontal or vertical nystagmus. Conjunctiva are normal.   Cardiovascular: Normal rate and regular rhythm. No murmur heard. Normal radial pulses.  Pulmonary/Chest: Breath sounds normal. No wheezes or rales.   Abdominal: Soft and " non-tender. No distention.    Musculoskeletal: Extremities exhibit normal range of motion without edema or tenderness. Patient ambulates with a normal narrow-based steady gait.   Neurological: Awake, alert and oriented to person, place, and time. No focal deficits noted. Cranial nerves II - XII intact. No pronator drift.  No dysmetria on cerebellar testing. Normal speech and language. Normal strength and sensation in bilateral upper and lower extremities.   Skin: Skin is warm and dry. No rash.   Psychiatric: Normal mood and affect. Appropriate for clinical situation.      DIAGNOSTIC STUDIES:  LABS  Labs Reviewed   CBC WITH DIFFERENTIAL - Abnormal; Notable for the following components:       Result Value    RBC 3.73 (*)     MCV 99.7 (*)     MCH 34.6 (*)     Monos (Absolute) 0.96 (*)     All other components within normal limits    Narrative:     Biotin intake of greater than 5 mg per day may interfere with  troponin levels, causing false low values.   COMP METABOLIC PANEL - Abnormal; Notable for the following components:    Sodium 129 (*)     Glucose 107 (*)     Alkaline Phosphatase 101 (*)     All other components within normal limits    Narrative:     Biotin intake of greater than 5 mg per day may interfere with  troponin levels, causing false low values.   ESTIMATED GFR - Abnormal; Notable for the following components:    GFR (CKD-EPI) 51 (*)     All other components within normal limits    Narrative:     Biotin intake of greater than 5 mg per day may interfere with  troponin levels, causing false low values.   TROPONIN    Narrative:     Biotin intake of greater than 5 mg per day may interfere with  troponin levels, causing false low values.       All labs reviewed and independently interpreted by myself    EKG Interpretation:    12 Lead EKG independently interpreted by myself to show:  EKG at 7:18 AM: Normal sinus rhythm, heart rate 69, normal axis, normal intervals, NE 67, , QTc 459, no acute ST-T segment  changes, no evidence of acute arrhythmia or ischemia per my independent interpretation, left ventricular hypertrophy is present    RADIOLOGY  Images independently interpreted by myself prior to radiologist review:No acute abnormalities    Final interpretation by radiology demonstrates:    CT-HEAD W/O   Final Result      No acute process.           The radiologist's interpretation of all radiological studies have been reviewed by me.      COURSE & MEDICAL DECISION MAKING    ED Observation Status? Yes; I am placing the patient in to an observation status due to a diagnostic uncertainty as well as therapeutic intensity. Patient placed in observation status at 7:05 AM, 9/3/2023.     Observation plan is as follows: Labratory studies, imaging, EKG, and reassessment.     Upon Reevaluation, the patient's condition has: Improved; and will be discharged.    Patient discharged from ED Observation status at 9:16 AM 9/3/2023     INITIAL ASSESSMENT AND PLAN    Patient is a 89-year-old female who presents for evaluation of headache and hypertension.  Differential diagnosis includes essential hypertension, hypertensive urgency, intracranial hemorrhage, ACS.  Diagnostic work-up includes labs, EKG and head CT.       REASSESSMENTS   7:14 AM - Patient seen and examined at bedside. Discussed plan of care, including laboratory studies, imaging, EKG, and reassessment. . Patient agrees to the plan of care.     9:16 AM -  I discussed the patient's diagnostic study results which show no acute abnormalities. Noted the patient's exam and vitals at this time are reassuring. Discussed plan for discharge; I advised the patient to return to the [facility] ED with any new or worsening symptoms. Patient and/or family was given the opportunity to ask any questions. I addressed all questions or concerns and the patient is stable for discharge at this time. Patient and/or family verbalizes understanding and agreement to this plan of care.             ER  COURSE AND FINAL DISPOSITION     Patient's initial vitals are notable for hypertension, she has not yet had her morning medications and is given her morning dose of metoprolol.  After this blood pressure improved to the 140 systolic.  EKG returns and demonstrates no evidence of acute arrhythmia or ischemia.  Labs returned and are independently interpreted by myself to demonstrate:  -Troponin is within normal limits, patient is not having any chest pain, EKG is nonischemic, no evidence of heart strain related to her hypertension, heart score of 3 low suspicion for ACS  -Electrolytes are notable for hyponatremia, patient has restricting her salt intake due to concern of hypertension.  Her sodium is 129, she is asymptomatic, no need for hospitalization for this, she is advised that she does not need to restrict her salt intake and can follow-up for repeat labs with her primary care doctor  -Renal function is at baseline with a GFR of 51  -Labs otherwise unremarkable    CT of the head returns and demonstrates no acute findings.  Therefore patient and daughter are reassured and advised on follow-up.  They are advised to keep a blood pressure diary and have hypertension followed up by primary care physician within the next 2 weeks.  They are then given strict return precautions and patient is discharged in stable condition.    The patient will return for new or worsening symptoms and is stable at the time of discharge.    The patient is referred to a primary physician for blood pressure management, diabetic screening, and for all other preventative health concerns.    DISPOSITION:  Patient will be discharged home in stable condition.    FOLLOW UP:  Yogesh Torres M.D.  [address] [place] [address]  [facility] [address]  [phone]            ADDITIONAL PROBLEM LIST AND RESOURCE UTILIZATION    Additional problems aside from the chief complaint that I have addressed: none    I have discussed management of the patient with  the following physicians and WILFREDO's: None    Discussion of management with other Q or appropriate source(s): None     Escalation of care considered, and ultimately not performed: see above.     Barriers to care at this time, including but not limited to:  None .     Decision tools and prescription drugs considered including, but not limited to: HEART Score 3 .    Please see review of records as noted above    FINAL IMPRESSION  1. Secondary hypertension    2. Other migraine without status migrainosus, not intractable    3. Hyponatremia        Ana FRANKLIN (Scribe), am scribing for, and in the presence of, Elyssa Nuñez M.D..    Electronically signed by: Ana Burris ([place]), 9/3/2023    Elyssa FRANKLIN M.D. personally performed the services described in this documentation, as scribed by Ana Burris in my presence, and it is both accurate and complete.    The note accurately reflects work and decisions made by me.  Elyssa Nuñez M.D.  9/3/2023  1:11 PM

## 2023-09-03 NOTE — DISCHARGE INSTRUCTIONS
As we discussed your sodium was slightly low at 129, please have this rechecked by your nephrologist.  You do not need to restrict your salt intake

## 2023-09-05 ENCOUNTER — HOSPITAL ENCOUNTER (OUTPATIENT)
Dept: LAB | Facility: MEDICAL CENTER | Age: 88
End: 2023-09-05
Attending: NURSE PRACTITIONER
Payer: MEDICARE

## 2023-09-05 LAB
ALBUMIN SERPL BCP-MCNC: 4.1 G/DL (ref 3.2–4.9)
ALBUMIN/GLOB SERPL: 1.2 G/DL
ALP SERPL-CCNC: 98 U/L (ref 30–99)
ALT SERPL-CCNC: 13 U/L (ref 2–50)
ANION GAP SERPL CALC-SCNC: 11 MMOL/L (ref 7–16)
APPEARANCE UR: CLEAR
AST SERPL-CCNC: 21 U/L (ref 12–45)
BACTERIA #/AREA URNS HPF: NEGATIVE /HPF
BILIRUB SERPL-MCNC: 0.4 MG/DL (ref 0.1–1.5)
BILIRUB UR QL STRIP.AUTO: NEGATIVE
BUN SERPL-MCNC: 29 MG/DL (ref 8–22)
CALCIUM ALBUM COR SERPL-MCNC: 9.1 MG/DL (ref 8.5–10.5)
CALCIUM SERPL-MCNC: 9.2 MG/DL (ref 8.5–10.5)
CHLORIDE SERPL-SCNC: 105 MMOL/L (ref 96–112)
CO2 SERPL-SCNC: 22 MMOL/L (ref 20–33)
COLOR UR: YELLOW
CREAT SERPL-MCNC: 1.07 MG/DL (ref 0.5–1.4)
CREAT UR-MCNC: 118.63 MG/DL
CREAT UR-MCNC: 122.29 MG/DL
EPI CELLS #/AREA URNS HPF: ABNORMAL /HPF
GFR SERPLBLD CREATININE-BSD FMLA CKD-EPI: 49 ML/MIN/1.73 M 2
GLOBULIN SER CALC-MCNC: 3.3 G/DL (ref 1.9–3.5)
GLUCOSE SERPL-MCNC: 125 MG/DL (ref 65–99)
GLUCOSE UR STRIP.AUTO-MCNC: NEGATIVE MG/DL
HYALINE CASTS #/AREA URNS LPF: ABNORMAL /LPF
KETONES UR STRIP.AUTO-MCNC: NEGATIVE MG/DL
LEUKOCYTE ESTERASE UR QL STRIP.AUTO: ABNORMAL
MICRO URNS: ABNORMAL
MICROALBUMIN UR-MCNC: <1.2 MG/DL
MICROALBUMIN/CREAT UR: NORMAL MG/G (ref 0–30)
NITRITE UR QL STRIP.AUTO: NEGATIVE
OSMOLALITY SERPL: 296 MOSM/KG H2O (ref 278–298)
PH UR STRIP.AUTO: 5.5 [PH] (ref 5–8)
POTASSIUM SERPL-SCNC: 4.2 MMOL/L (ref 3.6–5.5)
PROT SERPL-MCNC: 7.4 G/DL (ref 6–8.2)
PROT UR QL STRIP: NEGATIVE MG/DL
PROT UR-MCNC: 8 MG/DL (ref 0–15)
PROT/CREAT UR: 67 MG/G (ref 10–107)
RBC # URNS HPF: ABNORMAL /HPF
RBC UR QL AUTO: NEGATIVE
SODIUM SERPL-SCNC: 138 MMOL/L (ref 135–145)
SODIUM UR-SCNC: 24 MMOL/L
SP GR UR STRIP.AUTO: 1.02
UROBILINOGEN UR STRIP.AUTO-MCNC: 0.2 MG/DL
WBC #/AREA URNS HPF: ABNORMAL /HPF

## 2023-09-05 PROCEDURE — 84156 ASSAY OF PROTEIN URINE: CPT

## 2023-09-05 PROCEDURE — 83930 ASSAY OF BLOOD OSMOLALITY: CPT

## 2023-09-05 PROCEDURE — 36415 COLL VENOUS BLD VENIPUNCTURE: CPT

## 2023-09-05 PROCEDURE — 81001 URINALYSIS AUTO W/SCOPE: CPT

## 2023-09-05 PROCEDURE — 80053 COMPREHEN METABOLIC PANEL: CPT

## 2023-09-05 PROCEDURE — 82570 ASSAY OF URINE CREATININE: CPT

## 2023-09-05 PROCEDURE — 84300 ASSAY OF URINE SODIUM: CPT

## 2023-09-05 PROCEDURE — 82043 UR ALBUMIN QUANTITATIVE: CPT

## 2023-09-05 PROCEDURE — 83935 ASSAY OF URINE OSMOLALITY: CPT

## 2023-09-06 LAB — OSMOLALITY UR: 451 MOSM/KG H2O (ref 300–900)

## 2023-09-14 ENCOUNTER — OFFICE VISIT (OUTPATIENT)
Dept: CARDIOLOGY | Facility: MEDICAL CENTER | Age: 88
End: 2023-09-14
Attending: INTERNAL MEDICINE
Payer: MEDICARE

## 2023-09-14 VITALS
BODY MASS INDEX: 25.61 KG/M2 | WEIGHT: 150 LBS | HEART RATE: 74 BPM | RESPIRATION RATE: 16 BRPM | SYSTOLIC BLOOD PRESSURE: 126 MMHG | OXYGEN SATURATION: 96 % | DIASTOLIC BLOOD PRESSURE: 82 MMHG | HEIGHT: 64 IN

## 2023-09-14 DIAGNOSIS — N18.31 CHRONIC KIDNEY DISEASE, STAGE 3A: ICD-10-CM

## 2023-09-14 DIAGNOSIS — I50.33 ACUTE ON CHRONIC HEART FAILURE WITH PRESERVED EJECTION FRACTION (HCC): ICD-10-CM

## 2023-09-14 DIAGNOSIS — I48.91 ATRIAL FIBRILLATION, UNSPECIFIED TYPE (HCC): ICD-10-CM

## 2023-09-14 PROCEDURE — 3079F DIAST BP 80-89 MM HG: CPT | Performed by: INTERNAL MEDICINE

## 2023-09-14 PROCEDURE — 99214 OFFICE O/P EST MOD 30 MIN: CPT | Performed by: INTERNAL MEDICINE

## 2023-09-14 PROCEDURE — 3074F SYST BP LT 130 MM HG: CPT | Performed by: INTERNAL MEDICINE

## 2023-09-14 PROCEDURE — 93005 ELECTROCARDIOGRAM TRACING: CPT | Performed by: INTERNAL MEDICINE

## 2023-09-14 RX ORDER — METOPROLOL SUCCINATE 25 MG/1
25 TABLET, EXTENDED RELEASE ORAL DAILY
Qty: 90 TABLET | Refills: 3 | Status: SHIPPED | OUTPATIENT
Start: 2023-09-14

## 2023-09-14 ASSESSMENT — MINNESOTA LIVING WITH HEART FAILURE QUESTIONNAIRE (MLHF)
DIFFICULTY TO CONCENTRATE OR REMEMBERING THINGS: 2
MAKING YOU WORRY: 2
TIRED, FATIGUED OR LOW ON ENERGY: 3
DIFFICULTY GOING AWAY FROM HOME: 2
WORKING AROUND THE HOUSE OR YARD DIFFICULT: 2
FEELING LIKE A BURDEN TO FAMILY AND FRIENDS: 2
HAVING TO SIT OR LIE DOWN DURING THE DAY: 2
SWELLING IN ANKLES OR LEGS: 1
GIVING YOU SIDE EFFECTS FROM TREATMENTS: 4
DIFFICULTY SOCIALIZING WITH FAMILY OR FRIENDS: 2
TOTAL_SCORE: 44
WALKING ABOUT OR CLIMBING STAIRS DIFFICULT: 2
MAKING YOU STAY IN A HOSPITAL: 2
DIFFICULTY WORKING TO EARN A LIVING: 0
DIFFICULTY SLEEPING WELL AT NIGHT: 2
LOSS OF SELF CONTROL IN YOUR LIFE: 2
DIFFICULTY WITH RECREATIONAL PASTIMES, SPORTS, HOBBIES: 2
EATING LESS FOODS YOU LIKE: 5
DIFFICULTY WITH SEXUAL ACTIVITIES: 0
MAKING YOU FEEL DEPRESSED: 2
COSTING YOU MONEY FOR MEDICAL CARE: 2
MAKING YOU SHORT OF BREATH: 3

## 2023-09-14 ASSESSMENT — FIBROSIS 4 INDEX: FIB4 SCORE: 2.83

## 2023-09-14 NOTE — ASSESSMENT & PLAN NOTE
Regarding her diastolic heart failure she remains on appropriate medical therapy predominantly focusing and on blood pressure management.  I am hesitant to add an SGLT2 inhibitor given that she may have increased urination and proclivity to dehydration.  Patient is currently going to continue her current medications as scheduled.

## 2023-09-14 NOTE — ASSESSMENT & PLAN NOTE
Clinically, she is doing well and is on appropriate anticoagulation consisting of Eliquis 5 mg twice daily for stroke risk reduction.  No changes are made at this time.

## 2023-09-14 NOTE — ASSESSMENT & PLAN NOTE
Regarding her chronic kidney disease this is stable based on her most recent evaluation.  She is seen by her primary nephrologist.

## 2023-09-14 NOTE — PROGRESS NOTES
[facility]    PatientName:Veneracion Reyes VillagraciaDate: 2023  :1932    89 y.o.PCP:Yogesh Torres M.D.  MRN:[id number]          Problems and Plans    AF (atrial fibrillation) (HCC)  Clinically, she is doing well and is on appropriate anticoagulation consisting of Eliquis 5 mg twice daily for stroke risk reduction.  No changes are made at this time.    Acute on chronic heart failure with preserved ejection fraction (HCC)  Regarding her diastolic heart failure she remains on appropriate medical therapy predominantly focusing and on blood pressure management.  I am hesitant to add an SGLT2 inhibitor given that she may have increased urination and proclivity to dehydration.  Patient is currently going to continue her current medications as scheduled.    Chronic kidney disease, stage 3a (HCC)  Regarding her chronic kidney disease this is stable based on her most recent evaluation.  She is seen by her primary nephrologist.    Return in about 6 months (around 3/14/2024).      Encounter    Reason for Visit / Chief Complaint: Atrial fibrillation    HPI    89-year-old female with known history of atrial fibrillation on reduced dose Eliquis, aortic valve insufficiency, tricuspid valve insufficiency, hypertension, dyslipidemia, chronic kidney disease stage IIIb osteoarthritis presents in clinic for ongoing management of her arrhythmia.    Currently, she has she has been feeling well however was seen in the emergency department approximately 2 weeks ago with elevated blood pressure.  She had negative work-up for any type of cerebral vascular abnormality and her troponins were negative.  EKG was unchanged from her EKG in the office which demonstrated sinus rhythm with first-degree AV block.  She has been walking in the home for meal preparation and has been enjoying sleeping.  She is going to be traveling to the [facility] with her daughter in late spring early summer  2024  Past Medical History  Past Medical History:   Diagnosis Date    Arthritis     knees    Cataract     yony iol    Dental disorder     full dentures    GI bleed     in the [facility]    Heart burn     High cholesterol     Hypercholesteremia     Hypertension     pt states controlled on meds    Urinary tract infection 08/2019    currently on antibiotics for uti     Past Surgical History  Past Surgical History:   Procedure Laterality Date    ANTERIOR AND POSTERIOR REPAIR N/A 9/3/2019    Procedure: COLPORRHAPHY, COMBINED ANTEROPOSTERIOR - W/PERINEOPLASTY;  Surgeon: Jim Boykin M.D.;  Location: SURGERY SAME DAY [facility] ORS;  Service: Gynecology    ENTEROCELE REPAIR N/A 9/3/2019    Procedure: REPAIR, ENTEROCELE;  Surgeon: Jim Boykin M.D.;  Location: SURGERY SAME DAY [facility] ORS;  Service: Gynecology    BLADDER SLING FEMALE N/A 9/3/2019    Procedure: BLADDER SLING, FEMALE - TOT;  Surgeon: Jim Boykin M.D.;  Location: SURGERY SAME DAY [facility] ORS;  Service: Gynecology    VAGINAL SUSPENSION N/A 9/3/2019    Procedure: COLPOPEXY - SACROSPINOUS VAULT SUSPENSION;  Surgeon: Jim Boykin M.D.;  Location: SURGERY SAME DAY [facility] ORS;  Service: Gynecology    BREAST BIOPSY Left 6/14/2018    Procedure: BREAST BIOPSY - EXCISION MASS;  Surgeon: Kacie Lizama M.D.;  Location: SURGERY [facility];  Service: General    OTHER      Yony cataracts     Social History  Social History     Socioeconomic History    Marital status:      Spouse name: Not on file    Number of children: Not on file    Years of education: Not on file    Highest education level: Not on file   Occupational History    Not on file   Tobacco Use    Smoking status: Never    Smokeless tobacco: Never   Vaping Use    Vaping Use: Never used   Substance and Sexual Activity    Alcohol use: No    Drug use: No    Sexual activity: Never     Partners: Male   Other Topics Concern    Not on file   Social History Narrative    Not on file     Social  "Determinants of Health     Financial Resource Strain: Not on file   Food Insecurity: Not on file   Transportation Needs: Not on file   Physical Activity: Not on file   Stress: Not on file   Social Connections: Not on file   Intimate Partner Violence: Not on file   Housing Stability: Not on file     Past Family History  Family History   Problem Relation Age of Onset    Stroke Father     Breast Cancer Sister     Alcohol abuse Brother      Medication(s)  [unfilled]  Allergies  Penicillins    Review of Systems    A comprehensive 10 system review was conducted and is negative except as noted above in the HPI or here.      Vital Signs  /82 (BP Location: Left arm, Patient Position: Sitting, BP Cuff Size: Adult)   Pulse 74   Resp 16   Ht 1.626 m (5' 4\")   Wt 68 kg (150 lb)   SpO2 96%   BMI 25.75 kg/m²     Physical Exam  Vitals and nursing note reviewed.   Constitutional:       Appearance: Normal appearance.   HENT:      Head: Normocephalic and atraumatic.      Nose: Nose normal.      Mouth/Throat:      Mouth: Mucous membranes are moist.      Pharynx: Oropharynx is clear.   Eyes:      Pupils: Pupils are equal, round, and reactive to light.   Cardiovascular:      Rate and Rhythm: Normal rate and regular rhythm.      Heart sounds: No murmur heard.     No friction rub. No gallop.   Pulmonary:      Effort: Pulmonary effort is normal.      Breath sounds: Normal breath sounds.   Abdominal:      General: Bowel sounds are normal.      Palpations: Abdomen is soft.   Musculoskeletal:         General: Normal range of motion.      Cervical back: Normal range of motion and neck supple.   Skin:     General: Skin is warm and dry.   Neurological:      General: No focal deficit present.      Mental Status: She is alert and oriented to person, place, and time. Mental status is at baseline.   Psychiatric:         Mood and Affect: Mood normal.         Behavior: Behavior normal.         Thought Content: Thought content " "normal.         Judgment: Judgment normal.         Lab Results   Component Value Date/Time    TSHULTRASEN 1.190 03/23/2023 1658      No results found for: \"FREET4\"     Lab Results   Component Value Date/Time    HBA1C 5.6 03/23/2023 04:58 PM       Lab Results   Component Value Date/Time    CHOLSTRLTOT 120 03/18/2023 04:31 AM    LDL 44 03/18/2023 04:31 AM    HDL 53 03/18/2023 04:31 AM    TRIGLYCERIDE 117 03/18/2023 04:31 AM         Lab Results   Component Value Date/Time    SODIUM 138 09/05/2023 05:21 PM    POTASSIUM 4.2 09/05/2023 05:21 PM    CHLORIDE 105 09/05/2023 05:21 PM    CO2 22 09/05/2023 05:21 PM    GLUCOSE 125 (H) 09/05/2023 05:21 PM    BUN 29 (H) 09/05/2023 05:21 PM    CREATININE 1.07 09/05/2023 05:21 PM       Lab Results   Component Value Date/Time    ALKPHOSPHAT 98 09/05/2023 05:21 PM    ASTSGOT 21 09/05/2023 05:21 PM    ALTSGPT 13 09/05/2023 05:21 PM    TBILIRUBIN 0.4 09/05/2023 05:21 PM         Imaging  Sinus rhythm with a first-degree AV block.  I reviewed interpreted EKG.  Findings are discussed with the patient      Total patient time was estimated to be 25 minutes consisting of chart review, direct patient interaction, medication renewal, plan development and overall communication with the cardiovascular team.        Electronically signed by:   Mao Self DO, MPH  [facility]    Portions of this note were completed using voice recognition software (Dragon Naturally speaking software) . Occasional transcription errors may have escaped proof reading. I have made every reasonable attempt to correct obvious errors, but I expect that there are errors of grammar and possibly content that I did not discover before finalizing the note.      "

## 2023-09-19 LAB — EKG IMPRESSION: NORMAL

## 2023-09-19 PROCEDURE — 93010 ELECTROCARDIOGRAM REPORT: CPT | Performed by: INTERNAL MEDICINE

## 2023-09-27 ENCOUNTER — OFFICE VISIT (OUTPATIENT)
Dept: MEDICAL GROUP | Facility: MEDICAL CENTER | Age: 88
End: 2023-09-27
Payer: MEDICARE

## 2023-09-27 VITALS
HEART RATE: 68 BPM | SYSTOLIC BLOOD PRESSURE: 122 MMHG | TEMPERATURE: 97.6 F | OXYGEN SATURATION: 96 % | BODY MASS INDEX: 25.44 KG/M2 | WEIGHT: 149 LBS | DIASTOLIC BLOOD PRESSURE: 74 MMHG | RESPIRATION RATE: 16 BRPM | HEIGHT: 64 IN

## 2023-09-27 DIAGNOSIS — G62.9 NEUROPATHY: ICD-10-CM

## 2023-09-27 DIAGNOSIS — J30.9 ALLERGIC RHINITIS, UNSPECIFIED SEASONALITY, UNSPECIFIED TRIGGER: ICD-10-CM

## 2023-09-27 DIAGNOSIS — D68.69 SECONDARY HYPERCOAGULABLE STATE (HCC): ICD-10-CM

## 2023-09-27 DIAGNOSIS — Z23 NEED FOR VACCINATION: ICD-10-CM

## 2023-09-27 DIAGNOSIS — L29.9 PRURITUS: ICD-10-CM

## 2023-09-27 DIAGNOSIS — N18.31 CHRONIC KIDNEY DISEASE, STAGE 3A: ICD-10-CM

## 2023-09-27 DIAGNOSIS — F41.9 ANXIETY: ICD-10-CM

## 2023-09-27 DIAGNOSIS — I50.32 CHRONIC DIASTOLIC HEART FAILURE (HCC): ICD-10-CM

## 2023-09-27 DIAGNOSIS — I48.91 ATRIAL FIBRILLATION, UNSPECIFIED TYPE (HCC): ICD-10-CM

## 2023-09-27 PROBLEM — I50.33 ACUTE ON CHRONIC HEART FAILURE WITH PRESERVED EJECTION FRACTION (HCC): Status: RESOLVED | Noted: 2023-03-23 | Resolved: 2023-09-27

## 2023-09-27 RX ORDER — HYDROXYZINE HYDROCHLORIDE 25 MG/1
25 TABLET, FILM COATED ORAL 3 TIMES DAILY PRN
Qty: 90 TABLET | Refills: 0 | Status: SHIPPED | OUTPATIENT
Start: 2023-09-27

## 2023-09-27 RX ORDER — AZELASTINE 1 MG/ML
1 SPRAY, METERED NASAL 2 TIMES DAILY
Qty: 30 ML | Refills: 11 | Status: SHIPPED | OUTPATIENT
Start: 2023-09-27

## 2023-09-27 RX ORDER — TRIAMCINOLONE ACETONIDE 1 MG/G
CREAM TOPICAL
Qty: 80 G | Refills: 3 | Status: SHIPPED | OUTPATIENT
Start: 2023-09-27

## 2023-09-27 RX ORDER — CETIRIZINE HYDROCHLORIDE 10 MG/1
10 TABLET ORAL DAILY
Qty: 90 TABLET | Refills: 1 | Status: SHIPPED | OUTPATIENT
Start: 2023-09-27

## 2023-09-27 ASSESSMENT — FIBROSIS 4 INDEX: FIB4 SCORE: 2.83

## 2023-09-27 NOTE — PROGRESS NOTES
"  Subjective:     Veneracion Reyes Villagracia is a 89 y.o. female presenting with her daughter for a follow up          Current Outpatient Medications:     hydrOXYzine HCl (ATARAX) 25 MG Tab, Take 1 Tablet by mouth 3 times a day as needed for Anxiety., Disp: 90 Tablet, Rfl: 0    cetirizine (ZYRTEC) 10 MG Tab, Take 1 Tablet by mouth every day., Disp: 90 Tablet, Rfl: 1    azelastine (ASTELIN) 137 MCG/SPRAY nasal spray, Administer 1 Spray into affected nostril(S) 2 times a day., Disp: 30 mL, Rfl: 11    triamcinolone acetonide (KENALOG) 0.1 % Cream, Apply thin layer to affected area twice a day as needed for rash, do not use longer than 14 days at a time., Disp: 80 g, Rfl: 3    metoprolol SR (TOPROL XL) 25 MG TABLET SR 24 HR, Take 1 Tablet by mouth every day., Disp: 90 Tablet, Rfl: 3    lovastatin (MEVACOR) 20 MG Tab, Take 1 Tablet by mouth every day., Disp: 90 Tablet, Rfl: 3    apixaban (ELIQUIS) 5mg Tab, Take 1 Tablet by mouth 2 times a day., Disp: 180 Tablet, Rfl: 3    torsemide (DEMADEX) 5 MG Tab, Take 1 Tablet by mouth every day., Disp: 90 Tablet, Rfl: 1    losartan (COZAAR) 50 MG Tab, Take 1 Tablet by mouth every day., Disp: 90 Tablet, Rfl: 1    multivitamin Tab, Take 1 Tablet by mouth every day., Disp: , Rfl:     Ascorbic Acid (VITAMIN C PO), Take  by mouth., Disp: , Rfl:     MELATONIN PO, Take  by mouth., Disp: , Rfl:     Misc. Devices Misc, One bedside commode, Disp: 1 Each, Rfl: 0    Objective:     Vitals: /74   Pulse 68   Temp 36.4 °C (97.6 °F)   Resp 16   Ht 1.626 m (5' 4\")   Wt 67.6 kg (149 lb)   SpO2 96%   BMI 25.58 kg/m²   General: Alert  HEENT: Normocephalic.  Heart: Regular rate and rhythm.  S1 and S2 normal.  No murmurs appreciated.  Respiratory: Normal respiratory effort.  Clear to auscultation bilaterally.  Abdomen: Non-distended, soft  Extremities: No leg edema.    Assessment/Plan:     Diagnoses and all orders for this visit:    Chronic diastolic heart failure (HCC)  Atrial " fibrillation, unspecified type (HCC)  Secondary hypercoagulable state (HCC)  Chronic, stable, she sees cardiology regularly.  She continues on Eliquis, losartan, metoprolol, torsemide as needed.  She denies any chest pain, shortness of breath, lightheadedness, dizziness, leg swelling.    Anxiety  Acute, uncomplicated issue.  She was recently in the emergency department with elevated blood pressures, anxiety was likely contributing.  We discussed medication options, can try hydroxyzine as needed  -     hydrOXYzine HCl (ATARAX) 25 MG Tab; Take 1 Tablet by mouth 3 times a day as needed for Anxiety.    Allergic rhinitis, unspecified seasonality, unspecified trigger  Chronic, uncontrolled.  Flonase has not been helpful.  We will try Zyrtec and Astelin nasal spray  -     cetirizine (ZYRTEC) 10 MG Tab; Take 1 Tablet by mouth every day.  -     azelastine (ASTELIN) 137 MCG/SPRAY nasal spray; Administer 1 Spray into affected nostril(S) 2 times a day.    Neuropathy  Chronic, stable, we discussed trying vitamin B12 supplements, Voltaren gel to her hands    Chronic kidney disease, stage 3a (HCC)  Chronic, stable, she sees nephrology regularly    Pruritus  Acute, uncomplicated issue.  Has itchy skin on her back, no rash.  We will try triamcinolone  -     triamcinolone acetonide (KENALOG) 0.1 % Cream; Apply thin layer to affected area twice a day as needed for rash, do not use longer than 14 days at a time.    Need for vaccination  -     Influenza Vaccine, High Dose (65+ Only)

## 2023-10-11 ENCOUNTER — DOCUMENTATION (OUTPATIENT)
Dept: HEALTH INFORMATION MANAGEMENT | Facility: OTHER | Age: 88
End: 2023-10-11
Payer: MEDICARE

## 2023-11-29 ENCOUNTER — PATIENT MESSAGE (OUTPATIENT)
Dept: HEALTH INFORMATION MANAGEMENT | Facility: OTHER | Age: 88
End: 2023-11-29

## 2024-01-05 DIAGNOSIS — I48.91 ATRIAL FIBRILLATION, UNSPECIFIED TYPE (HCC): ICD-10-CM

## 2024-01-16 ENCOUNTER — OFFICE VISIT (OUTPATIENT)
Dept: CARDIOLOGY | Facility: MEDICAL CENTER | Age: 89
End: 2024-01-16
Attending: NURSE PRACTITIONER
Payer: MEDICARE

## 2024-01-16 VITALS
OXYGEN SATURATION: 96 % | SYSTOLIC BLOOD PRESSURE: 158 MMHG | WEIGHT: 154 LBS | BODY MASS INDEX: 26.29 KG/M2 | DIASTOLIC BLOOD PRESSURE: 70 MMHG | HEART RATE: 72 BPM | HEIGHT: 64 IN

## 2024-01-16 DIAGNOSIS — I50.9 HEART FAILURE, NYHA CLASS 2 (HCC): ICD-10-CM

## 2024-01-16 DIAGNOSIS — N18.31 CHRONIC KIDNEY DISEASE, STAGE 3A: ICD-10-CM

## 2024-01-16 DIAGNOSIS — I48.91 ATRIAL FIBRILLATION, UNSPECIFIED TYPE (HCC): ICD-10-CM

## 2024-01-16 DIAGNOSIS — R04.0 NOSEBLEED: ICD-10-CM

## 2024-01-16 DIAGNOSIS — D68.69 SECONDARY HYPERCOAGULABLE STATE (HCC): ICD-10-CM

## 2024-01-16 DIAGNOSIS — I50.30 ACC/AHA STAGE C HEART FAILURE WITH PRESERVED EJECTION FRACTION (HCC): ICD-10-CM

## 2024-01-16 DIAGNOSIS — E78.5 DYSLIPIDEMIA: ICD-10-CM

## 2024-01-16 PROCEDURE — 99214 OFFICE O/P EST MOD 30 MIN: CPT | Performed by: NURSE PRACTITIONER

## 2024-01-16 PROCEDURE — 99213 OFFICE O/P EST LOW 20 MIN: CPT | Performed by: NURSE PRACTITIONER

## 2024-01-16 PROCEDURE — 3078F DIAST BP <80 MM HG: CPT | Performed by: NURSE PRACTITIONER

## 2024-01-16 PROCEDURE — 3077F SYST BP >= 140 MM HG: CPT | Performed by: NURSE PRACTITIONER

## 2024-01-16 RX ORDER — LOSARTAN POTASSIUM 50 MG/1
50 TABLET ORAL DAILY
Qty: 90 TABLET | Refills: 3 | Status: SHIPPED | OUTPATIENT
Start: 2024-01-16

## 2024-01-16 RX ORDER — TORSEMIDE 5 MG/1
5 TABLET ORAL DAILY
Qty: 90 TABLET | Refills: 3 | Status: SHIPPED | OUTPATIENT
Start: 2024-01-16

## 2024-01-16 ASSESSMENT — ENCOUNTER SYMPTOMS
ABDOMINAL PAIN: 0
MYALGIAS: 0
PALPITATIONS: 0
CLAUDICATION: 0
DIZZINESS: 0
ORTHOPNEA: 0
COUGH: 0
PND: 0
SHORTNESS OF BREATH: 0
FEVER: 0

## 2024-01-16 ASSESSMENT — FIBROSIS 4 INDEX: FIB4 SCORE: 2.83

## 2024-01-16 NOTE — PROGRESS NOTES
Chief Complaint   Patient presents with    Atrial Fibrillation    Dyslipidemia       Subjective     Veneracion Reyes Villagracia is a 89 y.o. female who presents today for follow-up on nosebleeds with her daughter.    Patient of Dr. Self.  Patient was last seen in clinic on 9/14/2023 with Dr. Self.  No changes were made during that visit.      Patient comes in the office today reporting a bloody nose episode.  She states her last episode was about 3 days ago.  She is concerned about the bleeding as she continues to have a little bit when she wipes her nose or coughs.  She is asking if she needs to reduce her Eliquis.    She denies any other overt signs of bleeding.    She denies chest pain, shortness of breath, palpitations, orthopnea, PND, edema or dizziness/lightheadedness.    Patient typically does not check her blood pressures at home as she feels fine.    Her and her daughter reports that she has been taking her medications.    Additionally, patient has the following medical problems:     -Atrial fibrillation: Taking Eliquis and metoprolol    -Dyslipidemia: Taking lovastatin    -Hypertension: On losartan    -CKD stage III, followed by nephrology, Dr. Miramontes    Past Medical History:   Diagnosis Date    Arthritis     knees    Cataract     jesse iol    Dental disorder     full dentures    GI bleed     in the [facility]    Heart burn     High cholesterol     Hypercholesteremia     Hypertension     pt states controlled on meds    Urinary tract infection 08/2019    currently on antibiotics for uti     Past Surgical History:   Procedure Laterality Date    ANTERIOR AND POSTERIOR REPAIR N/A 9/3/2019    Procedure: COLPORRHAPHY, COMBINED ANTEROPOSTERIOR - W/PERINEOPLASTY;  Surgeon: Jim Boykin M.D.;  Location: SURGERY SAME DAY [facility];  Service: Gynecology    ENTEROCELE REPAIR N/A 9/3/2019    Procedure: REPAIR, ENTEROCELE;  Surgeon: Jim Boykin M.D.;  Location: SURGERY SAME DAY [facility];  Service:  Gynecology    BLADDER SLING FEMALE N/A 9/3/2019    Procedure: BLADDER SLING, FEMALE - TOT;  Surgeon: Jim Boykin M.D.;  Location: SURGERY SAME DAY [facility];  Service: Gynecology    VAGINAL SUSPENSION N/A 9/3/2019    Procedure: COLPOPEXY - SACROSPINOUS VAULT SUSPENSION;  Surgeon: Jim Boykin M.D.;  Location: SURGERY SAME DAY [facility];  Service: Gynecology    BREAST BIOPSY Left 6/14/2018    Procedure: BREAST BIOPSY - EXCISION MASS;  Surgeon: Kacie Lizama M.D.;  Location: SURGERY [facility];  Service: General    OTHER      Yony cataracts     Family History   Problem Relation Age of Onset    Stroke Father     Breast Cancer Sister     Alcohol abuse Brother      Social History     Socioeconomic History    Marital status:      Spouse name: Not on file    Number of children: Not on file    Years of education: Not on file    Highest education level: Not on file   Occupational History    Not on file   Tobacco Use    Smoking status: Never    Smokeless tobacco: Never   Vaping Use    Vaping Use: Never used   Substance and Sexual Activity    Alcohol use: No    Drug use: No    Sexual activity: Never     Partners: Male   Other Topics Concern    Not on file   Social History Narrative    Not on file     Social Determinants of Health     Financial Resource Strain: Not on file   Food Insecurity: Not on file   Transportation Needs: Not on file   Physical Activity: Not on file   Stress: Not on file   Social Connections: Not on file   Intimate Partner Violence: Not on file   Housing Stability: Not on file     Allergies   Allergen Reactions    Penicillins Shortness of Breath and Rash     Difficulty breathing rash     Outpatient Encounter Medications as of 1/16/2024   Medication Sig Dispense Refill    losartan (COZAAR) 50 MG Tab Take 1 Tablet by mouth every day. 90 Tablet 3    torsemide (DEMADEX) 5 MG Tab Take 1 Tablet by mouth every day. 90 Tablet 3    apixaban (ELIQUIS) 5mg Tab Take 1 Tablet by mouth 2  "times a day. 180 Tablet 1    cetirizine (ZYRTEC) 10 MG Tab Take 1 Tablet by mouth every day. 90 Tablet 1    azelastine (ASTELIN) 137 MCG/SPRAY nasal spray Administer 1 Spray into affected nostril(S) 2 times a day. 30 mL 11    triamcinolone acetonide (KENALOG) 0.1 % Cream Apply thin layer to affected area twice a day as needed for rash, do not use longer than 14 days at a time. 80 g 3    metoprolol SR (TOPROL XL) 25 MG TABLET SR 24 HR Take 1 Tablet by mouth every day. 90 Tablet 3    lovastatin (MEVACOR) 20 MG Tab Take 1 Tablet by mouth every day. 90 Tablet 3    multivitamin Tab Take 1 Tablet by mouth every day.      Ascorbic Acid (VITAMIN C PO) Take  by mouth.      MELATONIN PO Take  by mouth.      Misc. Devices Misc One bedside commode 1 Each 0    hydrOXYzine HCl (ATARAX) 25 MG Tab Take 1 Tablet by mouth 3 times a day as needed for Anxiety. (Patient not taking: Reported on 1/16/2024) 90 Tablet 0    [DISCONTINUED] torsemide (DEMADEX) 5 MG Tab Take 1 Tablet by mouth every day. 90 Tablet 1    [DISCONTINUED] losartan (COZAAR) 50 MG Tab Take 1 Tablet by mouth every day. 90 Tablet 1     No facility-administered encounter medications on file as of 1/16/2024.     Review of Systems   Constitutional:  Negative for fever and malaise/fatigue.   HENT:  Positive for nosebleeds.    Respiratory:  Negative for cough and shortness of breath.    Cardiovascular:  Negative for chest pain, palpitations, orthopnea, claudication, leg swelling and PND.   Gastrointestinal:  Negative for abdominal pain.   Musculoskeletal:  Negative for myalgias.   Neurological:  Negative for dizziness.   All other systems reviewed and are negative.             Objective     BP (!) 158/70 (BP Location: Left arm, Patient Position: Sitting, BP Cuff Size: Adult)   Pulse 72   Ht 1.626 m (5' 4\")   Wt 69.9 kg (154 lb)   SpO2 96%   BMI 26.43 kg/m²     Physical Exam  Vitals reviewed.   Constitutional:       Appearance: She is well-developed.      Comments: Using " a walker   HENT:      Head: Normocephalic and atraumatic.   Eyes:      Pupils: Pupils are equal, round, and reactive to light.   Neck:      Vascular: No JVD.   Cardiovascular:      Rate and Rhythm: Normal rate and regular rhythm.      Heart sounds: Normal heart sounds.   Pulmonary:      Effort: Pulmonary effort is normal. No respiratory distress.      Breath sounds: Normal breath sounds. No wheezing or rales.   Abdominal:      General: Bowel sounds are normal.      Palpations: Abdomen is soft.   Musculoskeletal:         General: Normal range of motion.      Cervical back: Normal range of motion and neck supple.      Right lower leg: No edema.      Left lower leg: No edema.   Skin:     General: Skin is warm and dry.   Neurological:      General: No focal deficit present.      Mental Status: She is alert and oriented to person, place, and time.   Psychiatric:         Behavior: Behavior normal.       Lab Results   Component Value Date/Time    CHOLSTRLTOT 120 03/18/2023 04:31 AM    LDL 44 03/18/2023 04:31 AM    HDL 53 03/18/2023 04:31 AM    TRIGLYCERIDE 117 03/18/2023 04:31 AM       Lab Results   Component Value Date/Time    SODIUM 138 09/05/2023 05:21 PM    POTASSIUM 4.2 09/05/2023 05:21 PM    CHLORIDE 105 09/05/2023 05:21 PM    CO2 22 09/05/2023 05:21 PM    GLUCOSE 125 (H) 09/05/2023 05:21 PM    BUN 29 (H) 09/05/2023 05:21 PM    CREATININE 1.07 09/05/2023 05:21 PM     Lab Results   Component Value Date/Time    ALKPHOSPHAT 98 09/05/2023 05:21 PM    ASTSGOT 21 09/05/2023 05:21 PM    ALTSGPT 13 09/05/2023 05:21 PM    TBILIRUBIN 0.4 09/05/2023 05:21 PM         Transthoracic Echo Report 6/17/2020  No prior study is available for comparison.   Normal left ventricular systolic function.  Left ventricular ejection   fraction is visually estimated to be 65%.   Indeterminate diastolic function.  Normal inferior vena cava size and inspiratory collapse.  Estimated right ventricular systolic pressure is  29 mmHg.     Transthoracic  Echo Report 3/18/2023  Normal left ventricular systolic function.  The left ventricular ejection fraction is visually estimated to be 55-  60%.  Diastolic function is difficult to assess with arrhythmia.  The right ventricular free wall is not well visualized.  Right ventricular systolic pressure is estimated to be 45-50 mmHg.  Moderate biatrial dilation.  Trace to mild aortic insufficiency.  Mild tricuspid regurgitation.  Mild pulmonic insufficiency.  Dilated inferior vena cava with blunted inspiratory collapse.      Assessment & Plan     1. ACC/AHA stage C heart failure with preserved ejection fraction (HCC)  losartan (COZAAR) 50 MG Tab    torsemide (DEMADEX) 5 MG Tab      2. Heart failure, NYHA class 2 (HCC)        3. Dyslipidemia        4. Atrial fibrillation, unspecified type (HCC)        5. Secondary hypercoagulable state (HCC)        6. Chronic kidney disease, stage 3a (HCC)        7. Nosebleed            Medical Decision Making: Today's Assessment/Status/Plan:          Nosebleeds:  -Discussed techniques to decrease nosebleeds by using water-based gel such as K-Y jelly or Ocean nasal spray  -If bleeding worsens, may need to refer over to ENT  -At this time, she has not had a reoccurrence, I would not reduce her Eliquis dose at this time.  Aside from age, patient does not qualify for reduction in dose    Atrial fibrillation:  -Continue Eliquis 5 mg twice a day per above    HFpEF, stage C, NYHA class I-II:  -Continue torsemide 5 mg daily    Hypertension:  -BP borderline elevated  -Recommend patient to monitor blood pressure a couple days a week and bring in log to her next visit  -At this time I will not increase her medications unless her home blood pressures are increasing  -Continue losartan 50 mg daily  -Continue metoprolol SR 25 mg daily  -Continue torsemide 5 mg daily    Dyslipidemia:  -Continue lovastatin 20 mg daily    CKD stage III:  -Followed by nephrology    FU in clinic in 3 months with Dr. Self.  Sooner if needed.    Patient verbalizes understanding and agrees with the plan of care.     PLEASE NOTE: This Note was created using voice recognition Software. I have made every reasonable attempt to correct obvious errors, but I expect that there are errors of grammar and possibly content that I did not discover before finalizing the note

## 2024-03-04 ENCOUNTER — TELEPHONE (OUTPATIENT)
Dept: HEALTH INFORMATION MANAGEMENT | Facility: OTHER | Age: 89
End: 2024-03-04
Payer: MEDICARE

## 2024-03-05 NOTE — TELEPHONE ENCOUNTER
Patients daughter declined stating her sister is still researching providers and they will call back.

## 2024-04-09 ENCOUNTER — APPOINTMENT (OUTPATIENT)
Dept: CARDIOLOGY | Facility: MEDICAL CENTER | Age: 89
End: 2024-04-09
Attending: INTERNAL MEDICINE
Payer: MEDICARE

## 2024-04-24 ENCOUNTER — HOSPITAL ENCOUNTER (EMERGENCY)
Facility: MEDICAL CENTER | Age: 89
End: 2024-04-24
Attending: STUDENT IN AN ORGANIZED HEALTH CARE EDUCATION/TRAINING PROGRAM
Payer: MEDICARE

## 2024-04-24 ENCOUNTER — APPOINTMENT (OUTPATIENT)
Dept: RADIOLOGY | Facility: MEDICAL CENTER | Age: 89
End: 2024-04-24
Attending: STUDENT IN AN ORGANIZED HEALTH CARE EDUCATION/TRAINING PROGRAM
Payer: MEDICARE

## 2024-04-24 VITALS
DIASTOLIC BLOOD PRESSURE: 69 MMHG | HEIGHT: 62 IN | WEIGHT: 150 LBS | TEMPERATURE: 97 F | SYSTOLIC BLOOD PRESSURE: 166 MMHG | OXYGEN SATURATION: 98 % | HEART RATE: 72 BPM | RESPIRATION RATE: 20 BRPM | BODY MASS INDEX: 27.6 KG/M2

## 2024-04-24 DIAGNOSIS — I15.9 SECONDARY HYPERTENSION: ICD-10-CM

## 2024-04-24 DIAGNOSIS — R42 VERTIGO: ICD-10-CM

## 2024-04-24 LAB
ALBUMIN SERPL BCP-MCNC: 4.3 G/DL (ref 3.2–4.9)
ALBUMIN/GLOB SERPL: 1.1 G/DL
ALP SERPL-CCNC: 97 U/L (ref 30–99)
ALT SERPL-CCNC: 13 U/L (ref 2–50)
ANION GAP SERPL CALC-SCNC: 13 MMOL/L (ref 7–16)
AST SERPL-CCNC: 24 U/L (ref 12–45)
BASOPHILS # BLD AUTO: 0.5 % (ref 0–1.8)
BASOPHILS # BLD: 0.04 K/UL (ref 0–0.12)
BILIRUB SERPL-MCNC: 0.5 MG/DL (ref 0.1–1.5)
BUN SERPL-MCNC: 22 MG/DL (ref 8–22)
CALCIUM ALBUM COR SERPL-MCNC: 9.4 MG/DL (ref 8.5–10.5)
CALCIUM SERPL-MCNC: 9.6 MG/DL (ref 8.5–10.5)
CHLORIDE SERPL-SCNC: 103 MMOL/L (ref 96–112)
CO2 SERPL-SCNC: 21 MMOL/L (ref 20–33)
CREAT SERPL-MCNC: 0.99 MG/DL (ref 0.5–1.4)
EKG IMPRESSION: NORMAL
EOSINOPHIL # BLD AUTO: 0.16 K/UL (ref 0–0.51)
EOSINOPHIL NFR BLD: 1.8 % (ref 0–6.9)
ERYTHROCYTE [DISTWIDTH] IN BLOOD BY AUTOMATED COUNT: 45.5 FL (ref 35.9–50)
GFR SERPLBLD CREATININE-BSD FMLA CKD-EPI: 53 ML/MIN/1.73 M 2
GLOBULIN SER CALC-MCNC: 4 G/DL (ref 1.9–3.5)
GLUCOSE SERPL-MCNC: 118 MG/DL (ref 65–99)
HCT VFR BLD AUTO: 36.5 % (ref 37–47)
HGB BLD-MCNC: 12.3 G/DL (ref 12–16)
IMM GRANULOCYTES # BLD AUTO: 0.03 K/UL (ref 0–0.11)
IMM GRANULOCYTES NFR BLD AUTO: 0.3 % (ref 0–0.9)
LYMPHOCYTES # BLD AUTO: 3.71 K/UL (ref 1–4.8)
LYMPHOCYTES NFR BLD: 42.4 % (ref 22–41)
MCH RBC QN AUTO: 33 PG (ref 27–33)
MCHC RBC AUTO-ENTMCNC: 33.7 G/DL (ref 32.2–35.5)
MCV RBC AUTO: 97.9 FL (ref 81.4–97.8)
MONOCYTES # BLD AUTO: 0.74 K/UL (ref 0–0.85)
MONOCYTES NFR BLD AUTO: 8.4 % (ref 0–13.4)
NEUTROPHILS # BLD AUTO: 4.08 K/UL (ref 1.82–7.42)
NEUTROPHILS NFR BLD: 46.6 % (ref 44–72)
NRBC # BLD AUTO: 0 K/UL
NRBC BLD-RTO: 0 /100 WBC (ref 0–0.2)
PLATELET # BLD AUTO: 214 K/UL (ref 164–446)
PMV BLD AUTO: 10.4 FL (ref 9–12.9)
POTASSIUM SERPL-SCNC: 4.8 MMOL/L (ref 3.6–5.5)
PROT SERPL-MCNC: 8.3 G/DL (ref 6–8.2)
RBC # BLD AUTO: 3.73 M/UL (ref 4.2–5.4)
SODIUM SERPL-SCNC: 137 MMOL/L (ref 135–145)
WBC # BLD AUTO: 8.8 K/UL (ref 4.8–10.8)

## 2024-04-24 PROCEDURE — 85025 COMPLETE CBC W/AUTO DIFF WBC: CPT

## 2024-04-24 PROCEDURE — 700102 HCHG RX REV CODE 250 W/ 637 OVERRIDE(OP): Mod: UD | Performed by: STUDENT IN AN ORGANIZED HEALTH CARE EDUCATION/TRAINING PROGRAM

## 2024-04-24 PROCEDURE — A9270 NON-COVERED ITEM OR SERVICE: HCPCS | Mod: UD | Performed by: STUDENT IN AN ORGANIZED HEALTH CARE EDUCATION/TRAINING PROGRAM

## 2024-04-24 PROCEDURE — 80053 COMPREHEN METABOLIC PANEL: CPT

## 2024-04-24 PROCEDURE — 93005 ELECTROCARDIOGRAM TRACING: CPT

## 2024-04-24 PROCEDURE — 93005 ELECTROCARDIOGRAM TRACING: CPT | Performed by: STUDENT IN AN ORGANIZED HEALTH CARE EDUCATION/TRAINING PROGRAM

## 2024-04-24 PROCEDURE — 99284 EMERGENCY DEPT VISIT MOD MDM: CPT

## 2024-04-24 PROCEDURE — 36415 COLL VENOUS BLD VENIPUNCTURE: CPT

## 2024-04-24 PROCEDURE — 70450 CT HEAD/BRAIN W/O DYE: CPT

## 2024-04-24 RX ORDER — METOPROLOL TARTRATE 1 MG/ML
2.5 INJECTION, SOLUTION INTRAVENOUS ONCE
Status: DISCONTINUED | OUTPATIENT
Start: 2024-04-24 | End: 2024-04-24

## 2024-04-24 RX ORDER — MECLIZINE HYDROCHLORIDE 25 MG/1
25 TABLET ORAL ONCE
Status: COMPLETED | OUTPATIENT
Start: 2024-04-24 | End: 2024-04-24

## 2024-04-24 RX ORDER — MECLIZINE HYDROCHLORIDE 25 MG/1
25 TABLET ORAL 3 TIMES DAILY PRN
Qty: 30 TABLET | Refills: 0 | Status: SHIPPED | OUTPATIENT
Start: 2024-04-24

## 2024-04-24 RX ADMIN — MECLIZINE HYDROCHLORIDE 25 MG: 25 TABLET ORAL at 05:57

## 2024-04-24 ASSESSMENT — FIBROSIS 4 INDEX: FIB4 SCORE: 2.87

## 2024-04-24 ASSESSMENT — PAIN DESCRIPTION - PAIN TYPE: TYPE: ACUTE PAIN

## 2024-04-24 NOTE — ED NOTES
Discharge instructions reviewed with patient/ family. Patient verbalizes understanding of follow up care, medication management, and reasons to return to ER. PIV removed with no complications. Family assists pt to get dressed. Pt wheeled to lobby.

## 2024-04-24 NOTE — ED NOTES
Pt wheeled to restroom. Pt able to stand and hover over seat during urination. Pt moved to room, placed in gown, on cardiac monitor, call light within reach. Family at bedside. IV established.

## 2024-04-24 NOTE — ED PROVIDER NOTES
CHIEF COMPLAINT  Chief Complaint   Patient presents with    Dizziness     Patient to triage c/o dizziness, nausea and hypertension (history of), symptoms started two days ago and have not resolved.        LIMITATION TO HISTORY   Select: Patient is very hard of hearing  HPI    Veneracion Reyes Villagracia is a 89 y.o. female who presents to the Emergency Department evaluation of sensation that the room is spinning with associated nausea and vomiting.  Patient states that for the past 3 days she has been having intermittent episodes where she feels like the room is spinning, seems to be worse when she turns her head to the left.  Denies any associated headaches numbness tingling weakness in extremities no trauma no falls.  Does have an underlying history of paroxysmal atrial fibrillation is anticoagulated on Eliquis.  Family member who is an RN took her blood pressure and found it to be elevated in the 170s to 180s range prompting the visit to the ER.    OUTSIDE HISTORIAN(S):  Select:  family at bedside states that the patient's been complaining of dizziness for the past 2 to 3 days.    EXTERNAL RECORDS REVIEWED  Select: Other general records reviewed patient does have a history of atrial fibrillation is on Eliquis as well as hypertension.      PAST MEDICAL HISTORY  Past Medical History:   Diagnosis Date    Arthritis     knees    Cataract     jesse iol    Dental disorder     full dentures    GI bleed     in the [facility]    Heart burn     High cholesterol     Hypercholesteremia     Hypertension     pt states controlled on meds    Urinary tract infection 08/2019    currently on antibiotics for uti     .    SURGICAL HISTORY  Past Surgical History:   Procedure Laterality Date    ANTERIOR AND POSTERIOR REPAIR N/A 9/3/2019    Procedure: COLPORRHAPHY, COMBINED ANTEROPOSTERIOR - W/PERINEOPLASTY;  Surgeon: Jim Boykin M.D.;  Location: SURGERY SAME DAY [facility];  Service: Gynecology    ENTEROCELE REPAIR N/A 9/3/2019     Procedure: REPAIR, ENTEROCELE;  Surgeon: Jim Boykin M.D.;  Location: SURGERY SAME DAY [facility];  Service: Gynecology    BLADDER SLING FEMALE N/A 9/3/2019    Procedure: BLADDER SLING, FEMALE - TOT;  Surgeon: Jim Boykin M.D.;  Location: SURGERY SAME DAY [facility];  Service: Gynecology    VAGINAL SUSPENSION N/A 9/3/2019    Procedure: COLPOPEXY - SACROSPINOUS VAULT SUSPENSION;  Surgeon: Jim Boykin M.D.;  Location: SURGERY SAME DAY [facility];  Service: Gynecology    BREAST BIOPSY Left 6/14/2018    Procedure: BREAST BIOPSY - EXCISION MASS;  Surgeon: Kacie Lizama M.D.;  Location: SURGERY [facility];  Service: General    OTHER      Yony cataracts         FAMILY HISTORY  Family History   Problem Relation Age of Onset    Stroke Father     Breast Cancer Sister     Alcohol abuse Brother           SOCIAL HISTORY  Social History     Socioeconomic History    Marital status:      Spouse name: Not on file    Number of children: Not on file    Years of education: Not on file    Highest education level: Not on file   Occupational History    Not on file   Tobacco Use    Smoking status: Never    Smokeless tobacco: Never   Vaping Use    Vaping Use: Never used   Substance and Sexual Activity    Alcohol use: No    Drug use: No    Sexual activity: Never     Partners: Male   Other Topics Concern    Not on file   Social History Narrative    Not on file     Social Determinants of Health     Financial Resource Strain: Not on file   Food Insecurity: Not on file   Transportation Needs: Not on file   Physical Activity: Not on file   Stress: Not on file   Social Connections: Not on file   Intimate Partner Violence: Not on file   Housing Stability: Not on file         CURRENT MEDICATIONS  No current facility-administered medications on file prior to encounter.     Current Outpatient Medications on File Prior to Encounter   Medication Sig Dispense Refill    losartan (COZAAR) 50 MG Tab Take 1 Tablet by  "mouth every day. 90 Tablet 3    torsemide (DEMADEX) 5 MG Tab Take 1 Tablet by mouth every day. 90 Tablet 3    apixaban (ELIQUIS) 5mg Tab Take 1 Tablet by mouth 2 times a day. 180 Tablet 1    hydrOXYzine HCl (ATARAX) 25 MG Tab Take 1 Tablet by mouth 3 times a day as needed for Anxiety. (Patient not taking: Reported on 1/16/2024) 90 Tablet 0    cetirizine (ZYRTEC) 10 MG Tab Take 1 Tablet by mouth every day. 90 Tablet 1    azelastine (ASTELIN) 137 MCG/SPRAY nasal spray Administer 1 Spray into affected nostril(S) 2 times a day. 30 mL 11    triamcinolone acetonide (KENALOG) 0.1 % Cream Apply thin layer to affected area twice a day as needed for rash, do not use longer than 14 days at a time. 80 g 3    metoprolol SR (TOPROL XL) 25 MG TABLET SR 24 HR Take 1 Tablet by mouth every day. 90 Tablet 3    lovastatin (MEVACOR) 20 MG Tab Take 1 Tablet by mouth every day. 90 Tablet 3    multivitamin Tab Take 1 Tablet by mouth every day.      Ascorbic Acid (VITAMIN C PO) Take  by mouth.      MELATONIN PO Take  by mouth.      Misc. Devices Misc One bedside commode 1 Each 0           ALLERGIES  Allergies   Allergen Reactions    Penicillins Shortness of Breath and Rash     Difficulty breathing rash       PHYSICAL EXAM  VITAL SIGNS:BP (!) 180/74   Pulse 79   Temp 35.9 °C (96.7 °F) (Temporal)   Resp 19   Ht 1.575 m (5' 2\")   Wt 68 kg (150 lb)   SpO2 99%   BMI 27.44 kg/m²       VITALS - vital signs documented prior to this note have been reviewed and noted,  GENERAL - awake, alert, oriented, GCS 15, no apparent distress, non-toxic  appearing  HEENT - normocephalic, atraumatic, pupils equal, sclera anicteric, mucus  membranes moist  NECK - supple, no meningismus, full active range of motion, trachea midline  CARDIOVASCULAR - regular rate/rhythm, no murmurs/gallops/rubs  PULMONARY - no respiratory distress, speaking in full sentences, clear to  auscultation bilaterally, no wheezing/ronchi/rales, no accessory muscle " use  GASTROINTESTINAL - soft, non-tender, non-distended, no rebound, guarding,  or peritonitis  GENITOURINARY - Deferred  NEUROLOGIC -cranial nerves II through XII are intact pupils are equally round reactive to light right upper and left upper extremity hand , flexion at elbow, extension at the elbow 5 out of 5,   right lower left lower extremity leg raise, plantar flexion, dorsiflexion 5 out of 5 bilaterally, sensation is grossly intact in all extremities patellar DTRs are 2+ bilaterally no truncal ataxia normal finger-to-nose no appreciable papilledema on funduscopic exam positive [place] to the left  MUSCULOSKELETAL - no obvious asymmetry or deformities present  EXTREMITIES - warm, well-perfused, no cyanosis or significant edema  DERMATOLOGIC - warm, dry, no rashes, no jaundice  PSYCHIATRIC - normal affect, normal insight, normal concentration    DIAGNOSTIC STUDIES / PROCEDURES  EKG  I have independently interpreted this EKG  Interpreted below by myself as EKG shows a rate controlled atrial fibrillation at a rate of 90 with a normal QRS and QTc interval normal axis, there is artifact in lead 5 though no obvious acute ST elevation depression T wave version to suggest scheme interpretation is atrial fibrillation     Report   Date Value Ref Range Status   2024       [facility] Emergency Dept.    Test Date:  2024  Pt Name:    VENERACION VILLAGRACIA       Department: ER  MRN:        [id number]                      Room:  Gender:     Female                       Technician: [id number]  :        [id number]                   Requested By:ER TRIAGE PROTOCOL  Order #:    [id number]                    Reading MD: Dequan Lieberman    Measurements  Intervals                                Axis  Rate:       90                           P:          0  WV:         0                            QRS:        33  QRSD:       96                           T:          44  QT:         383  QTc:         469    Interpretive Statements  Atrial fibrillation  Compared to ECG 09/14/2023 13:07:57  Sinus rhythm no longer present no acute ischemic changes there is artifact   in  lead V5  Electronically Signed On 04- 06:14:03 PDT by Dequan Lieberman                LABS  Labs Reviewed   CBC WITH DIFFERENTIAL - Abnormal; Notable for the following components:       Result Value    RBC 3.73 (*)     Hematocrit 36.5 (*)     MCV 97.9 (*)     Lymphocytes 42.40 (*)     All other components within normal limits   COMP METABOLIC PANEL - Abnormal; Notable for the following components:    Glucose 118 (*)     Total Protein 8.3 (*)     Globulin 4.0 (*)     All other components within normal limits   ESTIMATED GFR - Abnormal; Notable for the following components:    GFR (CKD-EPI) 53 (*)     All other components within normal limits       CBC shows no evidence of anemia CMP is without significant metabolic derangements  RADIOLOGY  I have independently interpreted the diagnostic imaging associated with this visit and am waiting the final reading from the radiologist.   My preliminary interpretation is as follows: CT head is negative for bleed      Radiologist interpretation:   CT-HEAD W/O   Final Result         1.  No acute intracranial abnormality is identified, there are nonspecific white matter changes, commonly associated with small vessel ischemic disease.  Associated mild cerebral atrophy is noted.   2.  Atherosclerosis.              COURSE & MEDICAL DECISION MAKING    ED COURSE:        INTERVENTIONS BY ME:  Medications   meclizine (Antivert) tablet 25 mg (25 mg Oral Given 4/24/24 0557)       Response on recheck: Reevaluation at 0 600 patient is resting comfortably in no acute distress her blood pressures has improved into the 160/70 systolic range without any intervention.  Patient states he feels much better after the dose of meclizine repeat neurologic exam is unremarkable.    INITIAL ASSESSMENT, COURSE AND PLAN  Care  Narrative: Patient presented for evaluation of vertiginous symptoms in the setting of hypertension.  I differential included was not limited to central versus peripheral vertigo intracranial bleed hypertensive urgency versus emergency among many other considerations.  Patient's vertigo has been ongoing for the past 2 to 3 days, does seem to be worse especially when she moves her head to the left and is not constant in nature.  Raising concern for a possible peripheral vertigo.  However given her headache significantly elevated blood pressure as well as complaint vertigo did obtain a CT head to evaluate for possible intracranial bleed.  Fortunately CT head was negative for acute process, and CBC and CMP showed no abnormalities of clinical significance.  EKG did demonstrate atrial fibrillation which she has a known history of.  After the patient received a dose of meclizine she did state that her vertigo  had completely resolved she was feeling much better and her blood pressure improved without any intervention..  As such I consider discharge reasonable return precautions were discussed with the patient as well as her family members patient was discharged in a stable condition.           ADDITIONAL PROBLEM LIST  Hypertension   DISPOSITION AND DISCUSSIONS    We considered inpatient management of the patient's hypertension as well as for further evaluation of her vertigo though blood pressure improved without any particular intervention and vertigo improved with meclizine, and that is not constant in nature lowering concern for central process at this time.  Escalation of care considered, and ultimately not performed:acute inpatient care management, however at this time, the patient is most appropriate for outpatient management    Decision tools and prescription drugs considered including, but not limited to:  Meclizine .    FINAL DIAGNOSIS  1. Vertigo    2. Secondary hypertension    #3 atrial fibrillation          Electronically signed by: Dequan Lieberman DO ,6:17 AM 04/24/24

## 2024-04-24 NOTE — ED TRIAGE NOTES
"Chief Complaint   Patient presents with    Dizziness     Patient to triage c/o dizziness, nausea and hypertension (history of), symptoms started two days ago and have not resolved.        BP (!) 197/92   Pulse 78   Temp 35.9 °C (96.7 °F) (Temporal)   Resp 17   Ht 1.575 m (5' 2\")   Wt 68 kg (150 lb)   SpO2 97%     Patient educated on ed triage process, instructed to notify staff of any new or worsening symptoms, verbalizes understanding. Patient returned to ed lobby, apologized for wait times.     "

## 2024-04-25 ENCOUNTER — APPOINTMENT (OUTPATIENT)
Dept: LAB | Facility: MEDICAL CENTER | Age: 89
End: 2024-04-25
Payer: MEDICARE

## 2024-04-25 ENCOUNTER — OFFICE VISIT (OUTPATIENT)
Dept: CARDIOLOGY | Facility: MEDICAL CENTER | Age: 89
End: 2024-04-25
Attending: NURSE PRACTITIONER
Payer: MEDICARE

## 2024-04-25 VITALS
BODY MASS INDEX: 28.34 KG/M2 | WEIGHT: 154 LBS | SYSTOLIC BLOOD PRESSURE: 134 MMHG | DIASTOLIC BLOOD PRESSURE: 64 MMHG | OXYGEN SATURATION: 92 % | RESPIRATION RATE: 18 BRPM | HEART RATE: 66 BPM | HEIGHT: 62 IN

## 2024-04-25 DIAGNOSIS — N18.31 CHRONIC KIDNEY DISEASE, STAGE 3A: ICD-10-CM

## 2024-04-25 DIAGNOSIS — E78.5 DYSLIPIDEMIA: ICD-10-CM

## 2024-04-25 DIAGNOSIS — I10 HTN (HYPERTENSION), MALIGNANT: ICD-10-CM

## 2024-04-25 DIAGNOSIS — R04.0 NOSEBLEED: ICD-10-CM

## 2024-04-25 DIAGNOSIS — D68.69 SECONDARY HYPERCOAGULABLE STATE (HCC): ICD-10-CM

## 2024-04-25 DIAGNOSIS — I50.30 ACC/AHA STAGE C HEART FAILURE WITH PRESERVED EJECTION FRACTION (HCC): ICD-10-CM

## 2024-04-25 DIAGNOSIS — I50.9 HEART FAILURE, NYHA CLASS 2 (HCC): ICD-10-CM

## 2024-04-25 PROCEDURE — 99214 OFFICE O/P EST MOD 30 MIN: CPT | Performed by: NURSE PRACTITIONER

## 2024-04-25 PROCEDURE — 99212 OFFICE O/P EST SF 10 MIN: CPT | Performed by: NURSE PRACTITIONER

## 2024-04-25 RX ORDER — LOSARTAN POTASSIUM 100 MG/1
100 TABLET ORAL DAILY
Qty: 90 TABLET | Refills: 3 | Status: SHIPPED | OUTPATIENT
Start: 2024-04-25

## 2024-04-25 ASSESSMENT — MINNESOTA LIVING WITH HEART FAILURE QUESTIONNAIRE (MLHF)
DIFFICULTY WITH RECREATIONAL PASTIMES, SPORTS, HOBBIES: 5
WALKING ABOUT OR CLIMBING STAIRS DIFFICULT: 5
GIVING YOU SIDE EFFECTS FROM TREATMENTS: 5
DIFFICULTY TO CONCENTRATE OR REMEMBERING THINGS: 4
SWELLING IN ANKLES OR LEGS: 5
EATING LESS FOODS YOU LIKE: 5
MAKING YOU STAY IN A HOSPITAL: 5
TOTAL_SCORE: 97
FEELING LIKE A BURDEN TO FAMILY AND FRIENDS: 3
TIRED, FATIGUED OR LOW ON ENERGY: 5
DIFFICULTY GOING AWAY FROM HOME: 5
MAKING YOU WORRY: 4
LOSS OF SELF CONTROL IN YOUR LIFE: 3
WORKING AROUND THE HOUSE OR YARD DIFFICULT: 5
MAKING YOU SHORT OF BREATH: 5
DIFFICULTY SLEEPING WELL AT NIGHT: 5
DIFFICULTY SOCIALIZING WITH FAMILY OR FRIENDS: 5
DIFFICULTY WORKING TO EARN A LIVING: 5
COSTING YOU MONEY FOR MEDICAL CARE: 5
HAVING TO SIT OR LIE DOWN DURING THE DAY: 5
MAKING YOU FEEL DEPRESSED: 3
DIFFICULTY WITH SEXUAL ACTIVITIES: 5

## 2024-04-25 ASSESSMENT — ENCOUNTER SYMPTOMS
ABDOMINAL PAIN: 0
FEVER: 0
DIZZINESS: 1
NAUSEA: 1
SHORTNESS OF BREATH: 0
PND: 0
ORTHOPNEA: 0
CLAUDICATION: 0
PALPITATIONS: 0
MYALGIAS: 0
COUGH: 0
VOMITING: 1

## 2024-04-25 ASSESSMENT — FIBROSIS 4 INDEX: FIB4 SCORE: 2.86

## 2024-04-25 NOTE — PROGRESS NOTES
Chief Complaint   Patient presents with    Follow-Up     Dx ACC/AHA stage C heart failure with preserved ejection fraction (HCC)         Subjective     Veneracion Reyes Villagracia is a 89 y.o. female who presents today for follow-up on nosebleeds with her daughter, Tulio.  Also spoke to her daughter-in-law, Yolanda.    Patient of Dr. Self.  Patient was last seen in clinic on 1/16/2024 with myself.  During that visit, patient was given recommendations for to help with her nosebleeds and recommended patient to monitor her blood pressures and bring in a log.    Patient does check her blood pressures, does report some elevated blood pressures as high as 190s, systolic.  When her blood pressure gets high, patient gets anxious.    Patient reports that her nosebleeds have been stable, she denies chest pain, palpitations, orthopnea, PND, edema, shortness of breath.    Patient reports that she was in the ER last night due to vertigo, room spinning, associated with nausea and vomiting.  Patient was prescribed meclizine and discharged home for outpatient management    She reports her home weights are stable around 154 pounds.    Patient plans on going to the [facility] in June for a few weeks possibly up to 3 months.    Additionally, patient has the following medical problems:     -Atrial fibrillation: Taking Eliquis and metoprolol    -Dyslipidemia: Taking lovastatin    -Hypertension: On losartan    -CKD stage III, followed by nephrology, Dr. Miramontes    Past Medical History:   Diagnosis Date    Arthritis     knees    Cataract     jesse iol    Dental disorder     full dentures    GI bleed     in the [facility]    Heart burn     High cholesterol     Hypercholesteremia     Hypertension     pt states controlled on meds    Urinary tract infection 08/2019    currently on antibiotics for uti     Past Surgical History:   Procedure Laterality Date    ANTERIOR AND POSTERIOR REPAIR N/A 9/3/2019    Procedure: COLPORRHAPHY, COMBINED  ANTEROPOSTERIOR - W/PERINEOPLASTY;  Surgeon: Jim Boykin M.D.;  Location: SURGERY SAME DAY [facility] ORS;  Service: Gynecology    ENTEROCELE REPAIR N/A 9/3/2019    Procedure: REPAIR, ENTEROCELE;  Surgeon: Jim Boykin M.D.;  Location: SURGERY SAME DAY [facility] ORS;  Service: Gynecology    BLADDER SLING FEMALE N/A 9/3/2019    Procedure: BLADDER SLING, FEMALE - TOT;  Surgeon: Jim Boykin M.D.;  Location: SURGERY SAME DAY [facility] ORS;  Service: Gynecology    VAGINAL SUSPENSION N/A 9/3/2019    Procedure: COLPOPEXY - SACROSPINOUS VAULT SUSPENSION;  Surgeon: Jim Boykin M.D.;  Location: SURGERY SAME DAY [facility] ORS;  Service: Gynecology    BREAST BIOPSY Left 6/14/2018    Procedure: BREAST BIOPSY - EXCISION MASS;  Surgeon: Kacie Lizama M.D.;  Location: SURGERY [facility] [place] [facility];  Service: General    OTHER      Yony cataracts     Family History   Problem Relation Age of Onset    Stroke Father     Breast Cancer Sister     Alcohol abuse Brother      Social History     Socioeconomic History    Marital status:      Spouse name: Not on file    Number of children: Not on file    Years of education: Not on file    Highest education level: Not on file   Occupational History    Not on file   Tobacco Use    Smoking status: Never    Smokeless tobacco: Never   Vaping Use    Vaping Use: Never used   Substance and Sexual Activity    Alcohol use: No    Drug use: No    Sexual activity: Never     Partners: Male   Other Topics Concern    Not on file   Social History Narrative    Not on file     Social Determinants of Health     Financial Resource Strain: Not on file   Food Insecurity: Not on file   Transportation Needs: Not on file   Physical Activity: Not on file   Stress: Not on file   Social Connections: Not on file   Intimate Partner Violence: Not on file   Housing Stability: Not on file     Allergies   Allergen Reactions    Penicillins Shortness of Breath and Rash     Difficulty breathing rash     Outpatient  Encounter Medications as of 4/25/2024   Medication Sig Dispense Refill    losartan (COZAAR) 100 MG Tab Take 1 Tablet by mouth every day. 90 Tablet 3    torsemide (DEMADEX) 5 MG Tab Take 1 Tablet by mouth every day. 90 Tablet 3    apixaban (ELIQUIS) 5mg Tab Take 1 Tablet by mouth 2 times a day. 180 Tablet 1    lovastatin (MEVACOR) 20 MG Tab Take 1 Tablet by mouth every day. 90 Tablet 3    multivitamin Tab Take 1 Tablet by mouth every day.      meclizine (ANTIVERT) 25 MG Tab Take 1 Tablet by mouth 3 times a day as needed for Vertigo. (Patient not taking: Reported on 4/25/2024) 30 Tablet 0    [DISCONTINUED] losartan (COZAAR) 50 MG Tab Take 1 Tablet by mouth every day. 90 Tablet 3    hydrOXYzine HCl (ATARAX) 25 MG Tab Take 1 Tablet by mouth 3 times a day as needed for Anxiety. (Patient not taking: Reported on 1/16/2024) 90 Tablet 0    triamcinolone acetonide (KENALOG) 0.1 % Cream Apply thin layer to affected area twice a day as needed for rash, do not use longer than 14 days at a time. 80 g 3    [DISCONTINUED] cetirizine (ZYRTEC) 10 MG Tab Take 1 Tablet by mouth every day. (Patient not taking: Reported on 4/25/2024) 90 Tablet 1    [DISCONTINUED] azelastine (ASTELIN) 137 MCG/SPRAY nasal spray Administer 1 Spray into affected nostril(S) 2 times a day. (Patient not taking: Reported on 4/25/2024) 30 mL 11    metoprolol SR (TOPROL XL) 25 MG TABLET SR 24 HR Take 1 Tablet by mouth every day. 90 Tablet 3    [DISCONTINUED] Ascorbic Acid (VITAMIN C PO) Take  by mouth. (Patient not taking: Reported on 4/25/2024)      [DISCONTINUED] MELATONIN PO Take  by mouth. (Patient not taking: Reported on 4/25/2024)      [DISCONTINUED] Misc. Devices Misc One bedside commode (Patient not taking: Reported on 4/25/2024) 1 Each 0     No facility-administered encounter medications on file as of 4/25/2024.     Review of Systems   Constitutional:  Negative for fever and malaise/fatigue.   HENT:  Negative for nosebleeds.    Respiratory:  Negative for  "cough and shortness of breath.    Cardiovascular:  Negative for chest pain, palpitations, orthopnea, claudication, leg swelling and PND.   Gastrointestinal:  Positive for nausea and vomiting. Negative for abdominal pain.   Musculoskeletal:  Negative for myalgias.   Neurological:  Positive for dizziness.   All other systems reviewed and are negative.             Objective     /64 (BP Location: Left arm, Patient Position: Sitting, BP Cuff Size: Adult)   Pulse 66   Resp 18   Ht 1.575 m (5' 2\")   Wt 69.9 kg (154 lb)   SpO2 92%   BMI 28.17 kg/m²     Physical Exam  Vitals reviewed.   Constitutional:       Appearance: She is well-developed.      Comments: Using a walker   HENT:      Head: Normocephalic and atraumatic.   Eyes:      Pupils: Pupils are equal, round, and reactive to light.   Neck:      Vascular: No JVD.   Cardiovascular:      Rate and Rhythm: Normal rate and regular rhythm.      Heart sounds: Normal heart sounds.   Pulmonary:      Effort: Pulmonary effort is normal. No respiratory distress.      Breath sounds: Normal breath sounds. No wheezing or rales.   Abdominal:      General: Bowel sounds are normal.      Palpations: Abdomen is soft.   Musculoskeletal:         General: Normal range of motion.      Cervical back: Normal range of motion and neck supple.      Right lower leg: No edema.      Left lower leg: No edema.   Skin:     General: Skin is warm and dry.   Neurological:      General: No focal deficit present.      Mental Status: She is alert and oriented to person, place, and time.   Psychiatric:         Behavior: Behavior normal.       Lab Results   Component Value Date/Time    CHOLSTRLTOT 120 03/18/2023 04:31 AM    LDL 44 03/18/2023 04:31 AM    HDL 53 03/18/2023 04:31 AM    TRIGLYCERIDE 117 03/18/2023 04:31 AM       Lab Results   Component Value Date/Time    SODIUM 137 04/24/2024 05:22 AM    POTASSIUM 4.8 04/24/2024 05:22 AM    CHLORIDE 103 04/24/2024 05:22 AM    CO2 21 04/24/2024 05:22 AM "    GLUCOSE 118 (H) 04/24/2024 05:22 AM    BUN 22 04/24/2024 05:22 AM    CREATININE 0.99 04/24/2024 05:22 AM     Lab Results   Component Value Date/Time    ALKPHOSPHAT 97 04/24/2024 05:22 AM    ASTSGOT 24 04/24/2024 05:22 AM    ALTSGPT 13 04/24/2024 05:22 AM    TBILIRUBIN 0.5 04/24/2024 05:22 AM         Transthoracic Echo Report 6/17/2020  No prior study is available for comparison.   Normal left ventricular systolic function.  Left ventricular ejection   fraction is visually estimated to be 65%.   Indeterminate diastolic function.  Normal inferior vena cava size and inspiratory collapse.  Estimated right ventricular systolic pressure is  29 mmHg.     Transthoracic Echo Report 3/18/2023  Normal left ventricular systolic function.  The left ventricular ejection fraction is visually estimated to be 55-  60%.  Diastolic function is difficult to assess with arrhythmia.  The right ventricular free wall is not well visualized.  Right ventricular systolic pressure is estimated to be 45-50 mmHg.  Moderate biatrial dilation.  Trace to mild aortic insufficiency.  Mild tricuspid regurgitation.  Mild pulmonic insufficiency.  Dilated inferior vena cava with blunted inspiratory collapse.      Assessment & Plan     1. ACC/AHA stage C heart failure with preserved ejection fraction (HCC)  losartan (COZAAR) 100 MG Tab    Basic Metabolic Panel      2. HTN (hypertension), malignant  Basic Metabolic Panel      3. Heart failure, NYHA class 2 (HCC)        4. Dyslipidemia        5. Secondary hypercoagulable state (HCC)        6. Chronic kidney disease, stage 3a        7. Nosebleed              Medical Decision Making: Today's Assessment/Status/Plan:        Vertigo at this time has resolved along with nausea and vomiting.    Nosebleeds:  -Controlled  -If bleeding worsens, may need to refer over to ENT  -Continue Eliquis 5 mg twice a day    Atrial fibrillation:  -Rate controlled  -Continue Eliquis 5 mg twice a day per above  -Home heart rates  ranged from the 50s to 80s  -Continue metoprolol SR 25 mg daily    HFpEF, stage C, NYHA class I-II:  -Continue torsemide 5 mg daily  -Patient appears euvolemic, home weights are stable    Hypertension:  -BP stable in office today  -Recommend increasing losartan to 100 mg daily, patient's daughter-in-law reports patient does take this at night as she has elevated blood pressures mainly at night  -Continue metoprolol SR 25 mg daily  -Continue torsemide 5 mg daily  -BMP in 1 to 2 weeks  -Monitor and log Blood pressures at home. Call office or RTC if BP increasing or >180/100 or if symptoms of elevated blood pressure present. Reviewed s/sx of stroke and heart attack. Patient to go to ER or call 911 if present.      Dyslipidemia:  -Continue lovastatin 20 mg daily    CKD stage III:  -Followed by nephrology    FU in clinic in 4 weeks with labs. Sooner if needed.    Patient verbalizes understanding and agrees with the plan of care.     PLEASE NOTE: This Note was created using voice recognition Software. I have made every reasonable attempt to correct obvious errors, but I expect that there are errors of grammar and possibly content that I did not discover before finalizing the note

## 2024-05-08 ENCOUNTER — HOSPITAL ENCOUNTER (OUTPATIENT)
Dept: LAB | Facility: MEDICAL CENTER | Age: 89
End: 2024-05-08
Attending: NURSE PRACTITIONER
Payer: MEDICARE

## 2024-05-08 ENCOUNTER — OFFICE VISIT (OUTPATIENT)
Dept: MEDICAL GROUP | Facility: LAB | Age: 89
End: 2024-05-08
Payer: MEDICARE

## 2024-05-08 VITALS
SYSTOLIC BLOOD PRESSURE: 132 MMHG | DIASTOLIC BLOOD PRESSURE: 60 MMHG | WEIGHT: 151 LBS | HEART RATE: 64 BPM | HEIGHT: 62 IN | OXYGEN SATURATION: 94 % | BODY MASS INDEX: 27.79 KG/M2 | RESPIRATION RATE: 14 BRPM | TEMPERATURE: 97.8 F

## 2024-05-08 DIAGNOSIS — R73.01 ELEVATED FASTING GLUCOSE: ICD-10-CM

## 2024-05-08 DIAGNOSIS — I48.91 ATRIAL FIBRILLATION, UNSPECIFIED TYPE (HCC): ICD-10-CM

## 2024-05-08 DIAGNOSIS — E78.5 DYSLIPIDEMIA: ICD-10-CM

## 2024-05-08 DIAGNOSIS — I50.30 ACC/AHA STAGE C HEART FAILURE WITH PRESERVED EJECTION FRACTION (HCC): ICD-10-CM

## 2024-05-08 DIAGNOSIS — I50.32 CHRONIC DIASTOLIC HEART FAILURE (HCC): ICD-10-CM

## 2024-05-08 DIAGNOSIS — M85.89 OSTEOPENIA OF MULTIPLE SITES: ICD-10-CM

## 2024-05-08 DIAGNOSIS — I10 HTN (HYPERTENSION), MALIGNANT: ICD-10-CM

## 2024-05-08 DIAGNOSIS — R42 DIZZINESS: ICD-10-CM

## 2024-05-08 DIAGNOSIS — M17.0 PRIMARY OSTEOARTHRITIS OF BOTH KNEES: ICD-10-CM

## 2024-05-08 DIAGNOSIS — D68.69 SECONDARY HYPERCOAGULABLE STATE (HCC): ICD-10-CM

## 2024-05-08 DIAGNOSIS — Z09 HOSPITAL DISCHARGE FOLLOW-UP: ICD-10-CM

## 2024-05-08 DIAGNOSIS — G62.9 NEUROPATHY: ICD-10-CM

## 2024-05-08 LAB
EST. AVERAGE GLUCOSE BLD GHB EST-MCNC: 117 MG/DL
HBA1C MFR BLD: 5.7 % (ref 4–5.6)

## 2024-05-08 PROCEDURE — 99214 OFFICE O/P EST MOD 30 MIN: CPT | Performed by: NURSE PRACTITIONER

## 2024-05-08 PROCEDURE — 3075F SYST BP GE 130 - 139MM HG: CPT | Performed by: NURSE PRACTITIONER

## 2024-05-08 PROCEDURE — 3078F DIAST BP <80 MM HG: CPT | Performed by: NURSE PRACTITIONER

## 2024-05-08 RX ORDER — METOPROLOL SUCCINATE 25 MG/1
25 TABLET, EXTENDED RELEASE ORAL DAILY
Qty: 90 TABLET | Refills: 3 | Status: SHIPPED | OUTPATIENT
Start: 2024-05-08

## 2024-05-08 RX ORDER — LOSARTAN POTASSIUM 100 MG/1
100 TABLET ORAL DAILY
Qty: 90 TABLET | Refills: 3 | Status: SHIPPED | OUTPATIENT
Start: 2024-05-08

## 2024-05-08 RX ORDER — LOVASTATIN 20 MG/1
20 TABLET ORAL DAILY
Qty: 90 TABLET | Refills: 3 | Status: SHIPPED | OUTPATIENT
Start: 2024-05-08

## 2024-05-08 RX ORDER — TORSEMIDE 5 MG/1
5 TABLET ORAL DAILY
Qty: 90 TABLET | Refills: 3 | Status: SHIPPED | OUTPATIENT
Start: 2024-05-08

## 2024-05-08 ASSESSMENT — FIBROSIS 4 INDEX: FIB4 SCORE: 2.86

## 2024-05-08 ASSESSMENT — PATIENT HEALTH QUESTIONNAIRE - PHQ9: CLINICAL INTERPRETATION OF PHQ2 SCORE: 0

## 2024-05-08 NOTE — ASSESSMENT & PLAN NOTE
Chronic condition. Reports numbness and tingling in the tips of her fingers of her right hand. She is right handed. This has been ongoing for several years.

## 2024-05-08 NOTE — ASSESSMENT & PLAN NOTE
Chronic, stable condition. Taking demadex 5 mg daily. Denies weight changes. Most recent labs WNL. Followed by cardiology.

## 2024-05-08 NOTE — ASSESSMENT & PLAN NOTE
Chronic, stable condition. Followed by cardiology. Taking metoprolol with good rate control. Eliquis for anticoagulation. Denies side effects.

## 2024-05-08 NOTE — ASSESSMENT & PLAN NOTE
Last DEXA 2019:  IMPRESSION:  According to the World Health Organization classification, bone mineral density of this patient is osteopenic for the lumbar spine and osteopenic for the left femur.  10-year Probability of Fracture:  Major Osteoporotic     15.3%  Hip     6.1%  Population      [place] ()  Reports she is currently supplementing vitamin D and calcium.  She reports she  is engaging in routine weightbearing exercise.  Denies recent falls or fractures.

## 2024-05-08 NOTE — PROGRESS NOTES
Subjective:     CC:    Chief Complaint   Patient presents with    [facility] [place]    Hospital Follow-up     HISTORY OF THE PRESENT ILLNESS: Patient is a 89 y.o. female. This pleasant patient is here today to establish care and discuss the following:    Hospital follow up  Was seen in the ER on 4/24 for dizziness. CT head was normal. Her BP was elevated. She was given meclizine with relief of symptoms. Reports that she is feeling better overall. Does still have some intermittent dizziness, usually when she stands too quickly, but otherwise no concerns. She is no longer taking the meclizine. BP is now well controlled. She is followed by cardiology.     AF (atrial fibrillation) ([facility])  Chronic, stable condition. Followed by cardiology. Taking metoprolol with good rate control. Eliquis for anticoagulation. Denies side effects.     Chronic diastolic heart failure (HCC)  Chronic, stable condition. Taking demadex 5 mg daily. Denies weight changes. Most recent labs WNL. Followed by cardiology.    Osteopenia of multiple sites  Last DEXA 2019:  IMPRESSION:  According to the World Health Organization classification, bone mineral density of this patient is osteopenic for the lumbar spine and osteopenic for the left femur.  10-year Probability of Fracture:  Major Osteoporotic     15.3%  Hip     6.1%  Population      [place] ()  Reports she is currently supplementing vitamin D and calcium.  She reports she  is engaging in routine weightbearing exercise.  Denies recent falls or fractures.    Primary osteoarthritis of both knees  Chronic condition. Uses walker.     Neuropathy  Chronic condition. Reports numbness and tingling in the tips of her fingers of her right hand. She is right handed. This has been ongoing for several years.     ROS:   As documented in history of present illness above      Objective:     Exam: /60 (BP Location: Right arm, Patient Position: Sitting, BP Cuff Size: Adult)   Pulse 64   Temp 36.6 °C (97.8  "°F)   Resp 14   Ht 1.575 m (5' 2\")   Wt 68.5 kg (151 lb)   SpO2 94%  Body mass index is 27.62 kg/m².    Constitutional: Alert, no distress, well-groomed.  Skin: Warm, dry, good turgor, no rashes in visible areas.  Eye: Equal, round and reactive, conjunctiva clear, lids normal.  ENMT: Lips without lesions, good dentition, moist mucous membranes.  Neck: Trachea midline, no masses, no thyromegaly.  Respiratory: Unlabored respiratory effort, no cough. Clear to ausculation. No rales, ronchi, or wheezing.  Cardiovascular: Regular rate, irregular rhythm without murmur.   MSK: Normal gait, moves all extremities.  Neuro: Grossly non-focal.   Psych: Alert and oriented x3, normal affect and mood.    Assessment & Plan:   89 y.o. female with the following -    1. Hospital discharge follow-up  Reviewed the patient's hospitalization in depth including imaging, lab work and discharge summary. Answered all questions.    2. Dizziness  Improved. Still having some mild dizziness, usually during position changes. Patient can continue to take meclizine as needed.     3. Atrial fibrillation, unspecified type (HCC)  Chronic, stable condition.  Patient to continue medication as prescribed.  Continue to follow-up with cardiology.  - metoprolol SR (TOPROL XL) 25 MG TABLET SR 24 HR; Take 1 Tablet by mouth every day.  Dispense: 90 Tablet; Refill: 3  - apixaban (ELIQUIS) 5mg Tab; Take 1 Tablet by mouth 2 times a day.  Dispense: 180 Tablet; Refill: 1    4. Secondary hypercoagulable state (HCC)  Chronic, stable condition.  Patient to continue medication as prescribed.  Continue to follow-up with cardiology.    5. Chronic diastolic heart failure (HCC)  Chronic, stable condition.  Patient to continue medication as prescribed.  Continue to follow-up with cardiology.  - torsemide (DEMADEX) 5 MG Tab; Take 1 Tablet by mouth every day.  Dispense: 90 Tablet; Refill: 3  - losartan (COZAAR) 100 MG Tab; Take 1 Tablet by mouth every day.  Dispense: 90 " Tablet; Refill: 3    6. Dyslipidemia  Chronic condition. Labs as indicated.  Continue statin medication and lifestyle modifications.  - Lipid Profile; Future  - lovastatin (MEVACOR) 20 MG Tab; Take 1 Tablet by mouth every day.  Dispense: 90 Tablet; Refill: 3    7. Elevated fasting glucose  Labs ordered. Chronic condition. Recommend to reduce sugar/carbohydrate/alcohol, eat more vegetables and lean meats such as fish/chicken/turkey. Recommend 30 minutes of cardiovascular exercise most days of the week.  - HEMOGLOBIN A1C; Future    8. Primary osteoarthritis of both knees  Chronic condition. Patient to continue to use walker for stability. Consider PT if symptoms worsen.     9. Osteopenia of multiple sites  Recommend weight bearing exercises, taking calcium 1500 mg daily, adequate vitamin D.     10. Neuropathy  Discussed nocturnal wrist splinting in the neutral position, ice, rest. If treatment fails, patient will consider referral to physical therapy.

## 2024-05-09 PROBLEM — R73.03 PREDIABETES: Status: ACTIVE | Noted: 2024-05-09

## 2024-05-09 LAB
ANION GAP SERPL CALC-SCNC: 12 MMOL/L (ref 7–16)
BUN SERPL-MCNC: 28 MG/DL (ref 8–22)
CALCIUM SERPL-MCNC: 9.7 MG/DL (ref 8.5–10.5)
CHLORIDE SERPL-SCNC: 100 MMOL/L (ref 96–112)
CHOLEST SERPL-MCNC: 158 MG/DL (ref 100–199)
CO2 SERPL-SCNC: 24 MMOL/L (ref 20–33)
CREAT SERPL-MCNC: 1.15 MG/DL (ref 0.5–1.4)
FASTING STATUS PATIENT QL REPORTED: NORMAL
GFR SERPLBLD CREATININE-BSD FMLA CKD-EPI: 45 ML/MIN/1.73 M 2
GLUCOSE SERPL-MCNC: 119 MG/DL (ref 65–99)
HDLC SERPL-MCNC: 45 MG/DL
LDLC SERPL CALC-MCNC: 48 MG/DL
POTASSIUM SERPL-SCNC: 4.7 MMOL/L (ref 3.6–5.5)
SODIUM SERPL-SCNC: 136 MMOL/L (ref 135–145)
TRIGL SERPL-MCNC: 325 MG/DL (ref 0–149)

## 2024-05-09 NOTE — RESULT ENCOUNTER NOTE
Labs noted, no changes at this time, please discussed with patient that she can cancel her appointment with myself as her follow-up with Dr. Self is the next week.

## 2024-05-10 ENCOUNTER — TELEPHONE (OUTPATIENT)
Dept: CARDIOLOGY | Facility: MEDICAL CENTER | Age: 89
End: 2024-05-10
Payer: MEDICARE

## 2024-05-10 NOTE — TELEPHONE ENCOUNTER
----- Message from ISMA William sent at 5/9/2024  4:15 PM PDT -----  Labs noted, no changes at this time, please discussed with patient that she can cancel her appointment with myself as her follow-up with Dr. Self is the next week.    Noted above per SERGIO. Called pt, spoke to pt's daughter Ashli. Advised no changes per lab results as stated by SERGIO. Confirmed we will cancel 05/22 appt and keep 05/30 appt with BD. She verbalized understanding. 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 Advised Ashli we can complete FMLA for pt's cardiac issues and she verbalized understanding. She was appreciative of call.    05/22/24 appt cancelled per SERGIO recommendations.

## 2024-05-22 ENCOUNTER — APPOINTMENT (OUTPATIENT)
Dept: CARDIOLOGY | Facility: MEDICAL CENTER | Age: 89
End: 2024-05-22
Attending: NURSE PRACTITIONER
Payer: MEDICARE

## 2024-05-30 ENCOUNTER — APPOINTMENT (OUTPATIENT)
Dept: CARDIOLOGY | Facility: MEDICAL CENTER | Age: 89
End: 2024-05-30
Attending: INTERNAL MEDICINE
Payer: MEDICARE

## 2024-07-10 ENCOUNTER — APPOINTMENT (OUTPATIENT)
Dept: CARDIOLOGY | Facility: MEDICAL CENTER | Age: 89
End: 2024-07-10
Payer: MEDICARE

## 2024-08-26 ENCOUNTER — PHARMACY VISIT (OUTPATIENT)
Dept: PHARMACY | Facility: MEDICAL CENTER | Age: 89
End: 2024-08-26
Payer: COMMERCIAL

## 2024-08-26 PROCEDURE — RXMED WILLOW AMBULATORY MEDICATION CHARGE: Performed by: STUDENT IN AN ORGANIZED HEALTH CARE EDUCATION/TRAINING PROGRAM

## 2024-08-26 RX ORDER — NITROFURANTOIN 25; 75 MG/1; MG/1
100 CAPSULE ORAL 3 TIMES DAILY
Qty: 10 CAPSULE | Refills: 0 | OUTPATIENT
Start: 2024-08-26

## 2024-09-05 ENCOUNTER — APPOINTMENT (OUTPATIENT)
Dept: RADIOLOGY | Facility: MEDICAL CENTER | Age: 89
DRG: 177 | End: 2024-09-05
Attending: EMERGENCY MEDICINE
Payer: MEDICARE

## 2024-09-05 ENCOUNTER — HOSPITAL ENCOUNTER (INPATIENT)
Facility: MEDICAL CENTER | Age: 89
LOS: 5 days | DRG: 177 | End: 2024-09-10
Attending: EMERGENCY MEDICINE | Admitting: HOSPITALIST
Payer: MEDICARE

## 2024-09-05 DIAGNOSIS — R30.0 DYSURIA: ICD-10-CM

## 2024-09-05 DIAGNOSIS — R09.02 HYPOXIA: ICD-10-CM

## 2024-09-05 DIAGNOSIS — U07.1 COVID-19: ICD-10-CM

## 2024-09-05 DIAGNOSIS — R26.9 GAIT DISTURBANCE: ICD-10-CM

## 2024-09-05 DIAGNOSIS — I48.91 ATRIAL FIBRILLATION, UNSPECIFIED TYPE (HCC): ICD-10-CM

## 2024-09-05 DIAGNOSIS — I50.9 CONGESTIVE HEART FAILURE, UNSPECIFIED HF CHRONICITY, UNSPECIFIED HEART FAILURE TYPE (HCC): ICD-10-CM

## 2024-09-05 PROBLEM — J96.00 ACUTE RESPIRATORY FAILURE DUE TO COVID-19 (HCC): Status: ACTIVE | Noted: 2024-09-05

## 2024-09-05 PROBLEM — D53.9 MACROCYTIC ANEMIA: Status: ACTIVE | Noted: 2024-09-05

## 2024-09-05 PROBLEM — E66.3 OVERWEIGHT (BMI 25.0-29.9): Status: ACTIVE | Noted: 2024-09-05

## 2024-09-05 LAB
ALBUMIN SERPL BCP-MCNC: 3.6 G/DL (ref 3.2–4.9)
ALBUMIN/GLOB SERPL: 1.1 G/DL
ALP SERPL-CCNC: 102 U/L (ref 30–99)
ALT SERPL-CCNC: 35 U/L (ref 2–50)
ANION GAP SERPL CALC-SCNC: 12 MMOL/L (ref 7–16)
APPEARANCE UR: CLEAR
AST SERPL-CCNC: 35 U/L (ref 12–45)
BASOPHILS # BLD AUTO: 0.4 % (ref 0–1.8)
BASOPHILS # BLD: 0.04 K/UL (ref 0–0.12)
BILIRUB SERPL-MCNC: 0.5 MG/DL (ref 0.1–1.5)
BILIRUB UR QL STRIP.AUTO: NEGATIVE
BUN SERPL-MCNC: 22 MG/DL (ref 8–22)
CALCIUM ALBUM COR SERPL-MCNC: 9.7 MG/DL (ref 8.5–10.5)
CALCIUM SERPL-MCNC: 9.4 MG/DL (ref 8.5–10.5)
CHLORIDE SERPL-SCNC: 102 MMOL/L (ref 96–112)
CO2 SERPL-SCNC: 22 MMOL/L (ref 20–33)
COLOR UR: YELLOW
CREAT SERPL-MCNC: 0.88 MG/DL (ref 0.5–1.4)
EKG IMPRESSION: NORMAL
EOSINOPHIL # BLD AUTO: 0.18 K/UL (ref 0–0.51)
EOSINOPHIL NFR BLD: 1.8 % (ref 0–6.9)
ERYTHROCYTE [DISTWIDTH] IN BLOOD BY AUTOMATED COUNT: 49.1 FL (ref 35.9–50)
FLUAV RNA SPEC QL NAA+PROBE: NEGATIVE
FLUBV RNA SPEC QL NAA+PROBE: NEGATIVE
GFR SERPLBLD CREATININE-BSD FMLA CKD-EPI: 61 ML/MIN/1.73 M 2
GLOBULIN SER CALC-MCNC: 3.4 G/DL (ref 1.9–3.5)
GLUCOSE SERPL-MCNC: 112 MG/DL (ref 65–99)
GLUCOSE UR STRIP.AUTO-MCNC: NEGATIVE MG/DL
HCT VFR BLD AUTO: 34.6 % (ref 37–47)
HGB BLD-MCNC: 11.4 G/DL (ref 12–16)
IMM GRANULOCYTES # BLD AUTO: 0.03 K/UL (ref 0–0.11)
IMM GRANULOCYTES NFR BLD AUTO: 0.3 % (ref 0–0.9)
KETONES UR STRIP.AUTO-MCNC: NEGATIVE MG/DL
LEUKOCYTE ESTERASE UR QL STRIP.AUTO: NEGATIVE
LYMPHOCYTES # BLD AUTO: 3.02 K/UL (ref 1–4.8)
LYMPHOCYTES NFR BLD: 30.9 % (ref 22–41)
MAGNESIUM SERPL-MCNC: 2.1 MG/DL (ref 1.5–2.5)
MCH RBC QN AUTO: 33.8 PG (ref 27–33)
MCHC RBC AUTO-ENTMCNC: 32.9 G/DL (ref 32.2–35.5)
MCV RBC AUTO: 102.7 FL (ref 81.4–97.8)
MICRO URNS: NORMAL
MONOCYTES # BLD AUTO: 0.7 K/UL (ref 0–0.85)
MONOCYTES NFR BLD AUTO: 7.2 % (ref 0–13.4)
NEUTROPHILS # BLD AUTO: 5.79 K/UL (ref 1.82–7.42)
NEUTROPHILS NFR BLD: 59.4 % (ref 44–72)
NITRITE UR QL STRIP.AUTO: NEGATIVE
NRBC # BLD AUTO: 0 K/UL
NRBC BLD-RTO: 0 /100 WBC (ref 0–0.2)
NT-PROBNP SERPL IA-MCNC: 3534 PG/ML (ref 0–125)
PH UR STRIP.AUTO: 5.5 [PH] (ref 5–8)
PLATELET # BLD AUTO: 193 K/UL (ref 164–446)
PMV BLD AUTO: 10.2 FL (ref 9–12.9)
POTASSIUM SERPL-SCNC: 4.6 MMOL/L (ref 3.6–5.5)
PROT SERPL-MCNC: 7 G/DL (ref 6–8.2)
PROT UR QL STRIP: NEGATIVE MG/DL
RBC # BLD AUTO: 3.37 M/UL (ref 4.2–5.4)
RBC UR QL AUTO: NEGATIVE
RSV RNA SPEC QL NAA+PROBE: NEGATIVE
SARS-COV-2 RNA RESP QL NAA+PROBE: DETECTED
SODIUM SERPL-SCNC: 136 MMOL/L (ref 135–145)
SP GR UR STRIP.AUTO: 1.01
TROPONIN T SERPL-MCNC: 27 NG/L (ref 6–19)
TROPONIN T SERPL-MCNC: 36 NG/L (ref 6–19)
TSH SERPL-ACNC: 2.62 UIU/ML (ref 0.35–5.5)
UROBILINOGEN UR STRIP.AUTO-MCNC: 0.2 MG/DL
WBC # BLD AUTO: 9.8 K/UL (ref 4.8–10.8)

## 2024-09-05 PROCEDURE — 96374 THER/PROPH/DIAG INJ IV PUSH: CPT

## 2024-09-05 PROCEDURE — 99223 1ST HOSP IP/OBS HIGH 75: CPT | Mod: AI | Performed by: HOSPITALIST

## 2024-09-05 PROCEDURE — A9270 NON-COVERED ITEM OR SERVICE: HCPCS

## 2024-09-05 PROCEDURE — 0241U HCHG SARS-COV-2 COVID-19 NFCT DS RESP RNA 4 TRGT ED POC: CPT

## 2024-09-05 PROCEDURE — 700102 HCHG RX REV CODE 250 W/ 637 OVERRIDE(OP): Performed by: HOSPITALIST

## 2024-09-05 PROCEDURE — 81003 URINALYSIS AUTO W/O SCOPE: CPT

## 2024-09-05 PROCEDURE — 84443 ASSAY THYROID STIM HORMONE: CPT

## 2024-09-05 PROCEDURE — 87077 CULTURE AEROBIC IDENTIFY: CPT

## 2024-09-05 PROCEDURE — A9270 NON-COVERED ITEM OR SERVICE: HCPCS | Performed by: EMERGENCY MEDICINE

## 2024-09-05 PROCEDURE — 71045 X-RAY EXAM CHEST 1 VIEW: CPT

## 2024-09-05 PROCEDURE — 80053 COMPREHEN METABOLIC PANEL: CPT

## 2024-09-05 PROCEDURE — 36415 COLL VENOUS BLD VENIPUNCTURE: CPT

## 2024-09-05 PROCEDURE — 700102 HCHG RX REV CODE 250 W/ 637 OVERRIDE(OP): Performed by: EMERGENCY MEDICINE

## 2024-09-05 PROCEDURE — 93005 ELECTROCARDIOGRAM TRACING: CPT | Performed by: EMERGENCY MEDICINE

## 2024-09-05 PROCEDURE — 700102 HCHG RX REV CODE 250 W/ 637 OVERRIDE(OP)

## 2024-09-05 PROCEDURE — 87086 URINE CULTURE/COLONY COUNT: CPT

## 2024-09-05 PROCEDURE — 85025 COMPLETE CBC W/AUTO DIFF WBC: CPT

## 2024-09-05 PROCEDURE — 83880 ASSAY OF NATRIURETIC PEPTIDE: CPT

## 2024-09-05 PROCEDURE — 83735 ASSAY OF MAGNESIUM: CPT

## 2024-09-05 PROCEDURE — A9270 NON-COVERED ITEM OR SERVICE: HCPCS | Performed by: HOSPITALIST

## 2024-09-05 PROCEDURE — 700111 HCHG RX REV CODE 636 W/ 250 OVERRIDE (IP): Mod: JZ | Performed by: EMERGENCY MEDICINE

## 2024-09-05 PROCEDURE — 99285 EMERGENCY DEPT VISIT HI MDM: CPT

## 2024-09-05 PROCEDURE — 770020 HCHG ROOM/CARE - TELE (206)

## 2024-09-05 PROCEDURE — 84484 ASSAY OF TROPONIN QUANT: CPT

## 2024-09-05 RX ORDER — LOSARTAN POTASSIUM 50 MG/1
100 TABLET ORAL DAILY
Status: DISCONTINUED | OUTPATIENT
Start: 2024-09-05 | End: 2024-09-10 | Stop reason: HOSPADM

## 2024-09-05 RX ORDER — ONDANSETRON 4 MG/1
4 TABLET, ORALLY DISINTEGRATING ORAL EVERY 4 HOURS PRN
Status: DISCONTINUED | OUTPATIENT
Start: 2024-09-05 | End: 2024-09-10 | Stop reason: HOSPADM

## 2024-09-05 RX ORDER — LABETALOL HYDROCHLORIDE 5 MG/ML
10 INJECTION, SOLUTION INTRAVENOUS EVERY 4 HOURS PRN
Status: DISCONTINUED | OUTPATIENT
Start: 2024-09-05 | End: 2024-09-10 | Stop reason: HOSPADM

## 2024-09-05 RX ORDER — ALBUTEROL SULFATE 90 UG/1
2 INHALANT RESPIRATORY (INHALATION)
Status: DISCONTINUED | OUTPATIENT
Start: 2024-09-05 | End: 2024-09-10 | Stop reason: HOSPADM

## 2024-09-05 RX ORDER — FUROSEMIDE 10 MG/ML
40 INJECTION INTRAMUSCULAR; INTRAVENOUS ONCE
Status: COMPLETED | OUTPATIENT
Start: 2024-09-05 | End: 2024-09-05

## 2024-09-05 RX ORDER — FUROSEMIDE 10 MG/ML
20 INJECTION INTRAMUSCULAR; INTRAVENOUS
Status: DISCONTINUED | OUTPATIENT
Start: 2024-09-05 | End: 2024-09-08

## 2024-09-05 RX ORDER — ACETAMINOPHEN 325 MG/1
650 TABLET ORAL EVERY 6 HOURS PRN
Status: DISCONTINUED | OUTPATIENT
Start: 2024-09-05 | End: 2024-09-10 | Stop reason: HOSPADM

## 2024-09-05 RX ORDER — ONDANSETRON 2 MG/ML
4 INJECTION INTRAMUSCULAR; INTRAVENOUS EVERY 6 HOURS PRN
Status: DISCONTINUED | OUTPATIENT
Start: 2024-09-05 | End: 2024-09-05

## 2024-09-05 RX ORDER — NITROGLYCERIN 0.4 MG/1
0.4 TABLET SUBLINGUAL ONCE
Status: COMPLETED | OUTPATIENT
Start: 2024-09-05 | End: 2024-09-05

## 2024-09-05 RX ORDER — ONDANSETRON 4 MG/1
4 TABLET, ORALLY DISINTEGRATING ORAL EVERY 6 HOURS PRN
Status: DISCONTINUED | OUTPATIENT
Start: 2024-09-05 | End: 2024-09-05

## 2024-09-05 RX ORDER — METOPROLOL SUCCINATE 25 MG/1
25 TABLET, EXTENDED RELEASE ORAL DAILY
Status: DISCONTINUED | OUTPATIENT
Start: 2024-09-06 | End: 2024-09-09

## 2024-09-05 RX ORDER — POLYETHYLENE GLYCOL 3350 17 G/17G
1 POWDER, FOR SOLUTION ORAL
Status: DISCONTINUED | OUTPATIENT
Start: 2024-09-05 | End: 2024-09-10 | Stop reason: HOSPADM

## 2024-09-05 RX ORDER — IPRATROPIUM BROMIDE AND ALBUTEROL SULFATE 2.5; .5 MG/3ML; MG/3ML
3 SOLUTION RESPIRATORY (INHALATION)
Status: DISCONTINUED | OUTPATIENT
Start: 2024-09-05 | End: 2024-09-10 | Stop reason: HOSPADM

## 2024-09-05 RX ORDER — LOVASTATIN 20 MG
20 TABLET ORAL DAILY
Status: DISCONTINUED | OUTPATIENT
Start: 2024-09-06 | End: 2024-09-10 | Stop reason: HOSPADM

## 2024-09-05 RX ORDER — DEXAMETHASONE 4 MG/1
6 TABLET ORAL DAILY
Status: DISCONTINUED | OUTPATIENT
Start: 2024-09-05 | End: 2024-09-10 | Stop reason: HOSPADM

## 2024-09-05 RX ORDER — ONDANSETRON 2 MG/ML
4 INJECTION INTRAMUSCULAR; INTRAVENOUS EVERY 4 HOURS PRN
Status: DISCONTINUED | OUTPATIENT
Start: 2024-09-05 | End: 2024-09-10 | Stop reason: HOSPADM

## 2024-09-05 RX ORDER — GUAIFENESIN 600 MG/1
600 TABLET, EXTENDED RELEASE ORAL 2 TIMES DAILY PRN
Status: DISCONTINUED | OUTPATIENT
Start: 2024-09-05 | End: 2024-09-10 | Stop reason: HOSPADM

## 2024-09-05 RX ORDER — AMOXICILLIN 250 MG
2 CAPSULE ORAL EVERY EVENING
Status: DISCONTINUED | OUTPATIENT
Start: 2024-09-05 | End: 2024-09-10 | Stop reason: HOSPADM

## 2024-09-05 RX ORDER — POTASSIUM CHLORIDE 1500 MG/1
20 TABLET, EXTENDED RELEASE ORAL 2 TIMES DAILY
Status: DISCONTINUED | OUTPATIENT
Start: 2024-09-05 | End: 2024-09-08

## 2024-09-05 RX ORDER — HYDROXYZINE HYDROCHLORIDE 25 MG/1
25 TABLET, FILM COATED ORAL 3 TIMES DAILY PRN
Status: DISCONTINUED | OUTPATIENT
Start: 2024-09-05 | End: 2024-09-10 | Stop reason: HOSPADM

## 2024-09-05 RX ADMIN — Medication 5 MG: at 21:59

## 2024-09-05 RX ADMIN — APIXABAN 5 MG: 5 TABLET, FILM COATED ORAL at 21:14

## 2024-09-05 RX ADMIN — DEXAMETHASONE 6 MG: 4 TABLET ORAL at 17:57

## 2024-09-05 RX ADMIN — FUROSEMIDE 40 MG: 10 INJECTION, SOLUTION INTRAVENOUS at 15:44

## 2024-09-05 RX ADMIN — POTASSIUM CHLORIDE 20 MEQ: 1500 TABLET, EXTENDED RELEASE ORAL at 21:15

## 2024-09-05 RX ADMIN — LOSARTAN POTASSIUM 100 MG: 50 TABLET, FILM COATED ORAL at 21:13

## 2024-09-05 RX ADMIN — NITROGLYCERIN 0.4 MG: 0.4 TABLET, ORALLY DISINTEGRATING SUBLINGUAL at 15:43

## 2024-09-05 SDOH — ECONOMIC STABILITY: TRANSPORTATION INSECURITY
IN THE PAST 12 MONTHS, HAS LACK OF RELIABLE TRANSPORTATION KEPT YOU FROM MEDICAL APPOINTMENTS, MEETINGS, WORK OR FROM GETTING THINGS NEEDED FOR DAILY LIVING?: NO

## 2024-09-05 SDOH — ECONOMIC STABILITY: TRANSPORTATION INSECURITY
IN THE PAST 12 MONTHS, HAS THE LACK OF TRANSPORTATION KEPT YOU FROM MEDICAL APPOINTMENTS OR FROM GETTING MEDICATIONS?: NO

## 2024-09-05 ASSESSMENT — LIFESTYLE VARIABLES
ON A TYPICAL DAY WHEN YOU DRINK ALCOHOL HOW MANY DRINKS DO YOU HAVE: 0
EVER HAD A DRINK FIRST THING IN THE MORNING TO STEADY YOUR NERVES TO GET RID OF A HANGOVER: NO
EVER FELT BAD OR GUILTY ABOUT YOUR DRINKING: NO
HAVE PEOPLE ANNOYED YOU BY CRITICIZING YOUR DRINKING: NO
CONSUMPTION TOTAL: NEGATIVE
TOTAL SCORE: 0
HAVE YOU EVER FELT YOU SHOULD CUT DOWN ON YOUR DRINKING: NO
AVERAGE NUMBER OF DAYS PER WEEK YOU HAVE A DRINK CONTAINING ALCOHOL: 0
DOES PATIENT WANT TO STOP DRINKING: NO
HOW MANY TIMES IN THE PAST YEAR HAVE YOU HAD 5 OR MORE DRINKS IN A DAY: 0
TOTAL SCORE: 0
TOTAL SCORE: 0
ALCOHOL_USE: NO

## 2024-09-05 ASSESSMENT — CHA2DS2 SCORE
CHA2DS2 VASC SCORE: 3
DIABETES: NO
PRIOR STROKE OR TIA OR THROMBOEMBOLISM: NO
CHF OR LEFT VENTRICULAR DYSFUNCTION: YES
VASCULAR DISEASE: NO
SEX: MALE
AGE 75 OR GREATER: YES
HYPERTENSION: NO
AGE 65 TO 74: NO

## 2024-09-05 ASSESSMENT — COGNITIVE AND FUNCTIONAL STATUS - GENERAL
WALKING IN HOSPITAL ROOM: A LITTLE
TOILETING: A LITTLE
CLIMB 3 TO 5 STEPS WITH RAILING: A LITTLE
MOVING TO AND FROM BED TO CHAIR: A LITTLE
SUGGESTED CMS G CODE MODIFIER MOBILITY: CJ
MOBILITY SCORE: 21
DAILY ACTIVITIY SCORE: 22
HELP NEEDED FOR BATHING: A LITTLE
SUGGESTED CMS G CODE MODIFIER DAILY ACTIVITY: CJ

## 2024-09-05 ASSESSMENT — PATIENT HEALTH QUESTIONNAIRE - PHQ9
SUM OF ALL RESPONSES TO PHQ9 QUESTIONS 1 AND 2: 0
1. LITTLE INTEREST OR PLEASURE IN DOING THINGS: NOT AT ALL
2. FEELING DOWN, DEPRESSED, IRRITABLE, OR HOPELESS: NOT AT ALL

## 2024-09-05 ASSESSMENT — SOCIAL DETERMINANTS OF HEALTH (SDOH)
WITHIN THE PAST 12 MONTHS, THE FOOD YOU BOUGHT JUST DIDN'T LAST AND YOU DIDN'T HAVE MONEY TO GET MORE: NEVER TRUE
WITHIN THE PAST 12 MONTHS, YOU WORRIED THAT YOUR FOOD WOULD RUN OUT BEFORE YOU GOT THE MONEY TO BUY MORE: NEVER TRUE
WITHIN THE LAST YEAR, HAVE YOU BEEN KICKED, HIT, SLAPPED, OR OTHERWISE PHYSICALLY HURT BY YOUR PARTNER OR EX-PARTNER?: NO
WITHIN THE LAST YEAR, HAVE YOU BEEN AFRAID OF YOUR PARTNER OR EX-PARTNER?: NO
WITHIN THE LAST YEAR, HAVE TO BEEN RAPED OR FORCED TO HAVE ANY KIND OF SEXUAL ACTIVITY BY YOUR PARTNER OR EX-PARTNER?: NO
IN THE PAST 12 MONTHS, HAS THE ELECTRIC, GAS, OIL, OR WATER COMPANY THREATENED TO SHUT OFF SERVICE IN YOUR HOME?: NO
WITHIN THE LAST YEAR, HAVE YOU BEEN HUMILIATED OR EMOTIONALLY ABUSED IN OTHER WAYS BY YOUR PARTNER OR EX-PARTNER?: NO

## 2024-09-05 ASSESSMENT — PAIN DESCRIPTION - PAIN TYPE: TYPE: ACUTE PAIN

## 2024-09-05 ASSESSMENT — FIBROSIS 4 INDEX
FIB4 SCORE: 2.86
FIB4 SCORE: 2.82

## 2024-09-05 NOTE — ED TRIAGE NOTES
Pt to triage via w/c c/o painful urination. Pt was diagnosed with uti 2 weeks ago and finished antibiotics but still having symptoms. nad

## 2024-09-05 NOTE — ASSESSMENT & PLAN NOTE
TTE showed mild LVH, normal EF, mild TR.  Dehydrated, holding diuresis  Monitor I's and O's, BMP, vitals

## 2024-09-05 NOTE — H&P
Hospital Medicine History & Physical Note    Date of Service  9/5/2024    Primary Care Physician  ESA Monte.      Code Status  Full Code    Chief Complaint  Chief Complaint   Patient presents with    Painful Urination       History of Presenting Illness  Veneracion Reyes Villagracia is a 89 y.o. female with HFpEF, pulmonary hypertension, dyslipidemia, hypertension, and recurrent UTIs.  She presented 9/5/2024 with recent history of UTI which she was given Bactrim for but due to concerns of possible allergies she did not complete antibiotics stopped taking her antibiotics.  She since that time lost the taste of food and was coming more lethargic.  Her daughters were concerned possible COVID.  Her daughter was a nurse and had her come to the emergency room.  Here in the ER she is found to be hypoxic requiring 3 L of oxygen with an SpO2 now of 95% without oxygen she was in the mid 80s.  X-ray significant for bilateral opacification possible volume overload with a BNP that is greater than 3000.  The patient is alert she has a cough some mild sputum production denies any overt fevers no rhinorrhea no sore throat.  She is weak, feels some abdominal discomfort and constipation but no diarrhea.  Denies any current urinary issues.    In the emergency room she is COVID-positive, BNP 3534, troponin PARISH: 27    I discussed the plan of care with patient and family.    Review of Systems  ROS    Past Medical History   has a past medical history of Arthritis, Cataract, Dental disorder, GI bleed, Heart burn, High cholesterol, Hypercholesteremia, Hypertension, and Urinary tract infection (08/2019).    Surgical History   has a past surgical history that includes breast biopsy (Left, 6/14/2018); other; anterior and posterior repair (N/A, 9/3/2019); enterocele repair (N/A, 9/3/2019); bladder sling female (N/A, 9/3/2019); and vaginal suspension (N/A, 9/3/2019).     Family History  family history includes Alcohol abuse in her  brother; Breast Cancer in her sister; Stroke in her father.   Family history reviewed with patient. There is no family history that is pertinent to the chief complaint.     Social History   reports that she has never smoked. She has never used smokeless tobacco. She reports that she does not drink alcohol and does not use drugs.  .  She has had 9 kids.  Lives with one of her daughters.  Normally gets around holding onto furniture around her house otherwise uses a cane and a rolling walker. Acute respir    Allergies  Allergies   Allergen Reactions    Penicillins Shortness of Breath and Rash     Difficulty breathing rash       Medications  Prior to Admission Medications   Prescriptions Last Dose Informant Patient Reported? Taking?   apixaban (ELIQUIS) 5mg Tab 9/5/2024 at 0700 Patient, Family Member No Yes   Sig: Take 1 Tablet by mouth 2 times a day.   losartan (COZAAR) 100 MG Tab 9/4/2024 at hs Patient, Family Member No Yes   Sig: Take 1 Tablet by mouth every day.   lovastatin (MEVACOR) 20 MG Tab 9/5/2024 at am Patient, Family Member No Yes   Sig: Take 1 Tablet by mouth every day.   metoprolol SR (TOPROL XL) 25 MG TABLET SR 24 HR 9/5/2024 at 0700 Patient, Family Member No Yes   Sig: Take 1 Tablet by mouth every day.   nitrofurantoin (MACROBID) 100 MG Cap 8/31/2024 at finished Patient, Family Member No No   Sig: take 1 capsule by mouth twice a day   torsemide (DEMADEX) 5 MG Tab 9/5/2024 at am Patient, Family Member No Yes   Sig: Take 1 Tablet by mouth every day.      Facility-Administered Medications: None       Physical Exam  Temp:  [36.9 °C (98.5 °F)] 36.9 °C (98.5 °F)  Pulse:  [88-91] 91  Resp:  [16-32] 32  BP: (181-194)/(75-86) 188/79  SpO2:  [90 %-95 %] 95 %  Blood Pressure : (!) 186/86   Temperature: 36.9 °C (98.5 °F)   Pulse: 89   Respiration: 16   Pulse Oximetry: 95 %       Physical Exam    Laboratory:  Recent Labs     09/05/24  1339   WBC 9.8   RBC 3.37*   HEMOGLOBIN 11.4*   HEMATOCRIT 34.6*   MCV  102.7*   MCH 33.8*   MCHC 32.9   RDW 49.1   PLATELETCT 193   MPV 10.2     Recent Labs     09/05/24  1339   SODIUM 136   POTASSIUM 4.6   CHLORIDE 102   CO2 22   GLUCOSE 112*   BUN 22   CREATININE 0.88   CALCIUM 9.4     Recent Labs     09/05/24  1339   ALTSGPT 35   ASTSGOT 35   ALKPHOSPHAT 102*   TBILIRUBIN 0.5   GLUCOSE 112*         Recent Labs     09/05/24  1339   NTPROBNP 3534*         Recent Labs     09/05/24  1339   TROPONINT 27*       Imaging:  DX-CHEST-PORTABLE (1 VIEW)   Final Result      1.  Cardiomegaly with pulmonary edema.      2.  Small bilateral pleural effusions.      EC-ECHOCARDIOGRAM COMPLETE W/O CONT    (Results Pending)       Assessment/Plan:  Justification for Admission Status  I anticipate this patient will require at least two midnights for appropriate medical management, necessitating inpatient admission because acute hypoxia with COVID 19 pneumonia and possible heart failure/volume overload    Patient will need a Telemetry bed on MEDICAL service .  The need is secondary to elevated troponin and monitor on atrial fibrillation with acute respiratory failure with hypoxia.    * Acute respiratory failure due to COVID-19 (HCC)- (present on admission)  Assessment & Plan  Acute respiratory failure secondary to COVID-19 and concern of volume overload with elevated BNP  Decadron 6mg oral daily x 10 days  Titrate oxygen to keep SpO2 >89%  Bronchodilaters and nebs as needed  Diuresis with Lasix  Monitor I's and O's, labs, vitals  Check new echocardiogram has had history of reserved EF and elevated RVSP.  If worsens may need baricitinib      Macrocytic anemia  Assessment & Plan  Likely component of fatty liver  Monitor CBC    COVID-19  Assessment & Plan  see acute respiratory failure secondary to COVID  Decadron  Possible need of baricitinib if worsens    Overweight (BMI 25.0-29.9)  Assessment & Plan  Body mass index is 27.98 kg/m².    Chronic diastolic heart failure (HCC)- (present on  admission)  Assessment & Plan  Prior echocardiogram 2023 showed preserved EF 55% with elevated RVSP  Checking new echocardiogram given elevation of BNP greater than 3400  Diuretics  Monitor I's and O's, BMP, vitals    AF (atrial fibrillation) ([facility])- (present on admission)  Assessment & Plan  Rate control  Continue apixaban 5 mg twice a day  Monitoring on telemetry    Hearing loss- (present on admission)  Assessment & Plan  Hearing aids as needed  She speaks 2 languages her native tongue hears better with her daughter talking.    Dyslipidemia- (present on admission)  Assessment & Plan  Continue active management with lovastatin 20 mg daily        VTE prophylaxis: SCDs/TEDs and therapeutic anticoagulation with apixaban

## 2024-09-05 NOTE — ASSESSMENT & PLAN NOTE
see acute respiratory failure secondary to COVID  Decadron  Persistent leukocytosis, course of azithromycin started

## 2024-09-05 NOTE — ASSESSMENT & PLAN NOTE
Bradycardic, changing metoprolol to twice daily with hold parameters  Continue apixaban 5 mg twice a day  Monitoring on telemetry

## 2024-09-05 NOTE — ASSESSMENT & PLAN NOTE
Hearing aids as needed  She speaks 2 languages her native tongue hears better with her daughter talking.

## 2024-09-05 NOTE — ASSESSMENT & PLAN NOTE
Acute respiratory failure secondary to COVID-19 and concern of volume overload with elevated BNP  Decadron 6mg oral daily x 10 days  Titrate oxygen to keep SpO2 >89%, she is weaned down to room air during the day  Holding Lasix because of rising creatinine  Complete course of azithromycin

## 2024-09-05 NOTE — ED NOTES
To 58 via w/c with same report as triage note.  Daughter also reports pt SOB with any activity and congestion.  Denies fever, denies cough.

## 2024-09-05 NOTE — ED NOTES
Medication history reviewed with patient & family at bedside.   Med rec is complete  Allergies reviewed.     Patient was prescribed a 5 day course of Macrobid 100mg bid on 8/26/24- this was completed per family.   Anticoagulants: Eliquis 5mg - last dose 9/5/24 0700.    Refugio Butts PhT

## 2024-09-05 NOTE — ED PROVIDER NOTES
"ED Provider Note    CHIEF COMPLAINT  Chief Complaint   Patient presents with    Painful Urination       EXTERNAL RECORDS REVIEWED  Outpatient Notes patient was seen at the cardiology office April 25, 2024 for follow-up for stage III heart failure with preserved ejection fraction.  She was reported at that time to be experiencing elevated blood pressures.  It was noted that her blood pressure gets high when she is anxious.  Patient had an echocardiogram done March 18, 2023 which showed a normal left ejection fraction which is estimated to be 55 to 60%.  Diastolic function was difficult to assess with the arrhythmia.  She was told to continue her Eliquis and her torsemide weights were reported to be stable at that time.  Her losartan was increased due to elevated blood pressures.  Patient also has a history of atrial fibrillation in addition to her diastolic heart failure.    HPI/ROS  LIMITATION TO HISTORY   Select: Language  ,  Used .  Daughter provides translation.  OUTSIDE HISTORIAN(S):  Family daughter provides history via translation as noted below.    Veneracion Reyes Villagracia is a 89 y.o. female who presents to the ER with persistent dysuria despite finishing a course of Macrobid.  Patient also complains of shortness of breath with exertion that she noticed this morning.  Daughter reports that patient gets short of breath anytime she tries to do anything or walk.  No leg swelling.  Patient has a history of \"fluid on her lungs\" and for this reason the daughter's concern.  No cough.  No chest pain.  Patient has not had fevers or chills.  She denies abdominal pain.    PAST MEDICAL HISTORY   has a past medical history of Arthritis, Cataract, Dental disorder, GI bleed, Heart burn, High cholesterol, Hypercholesteremia, Hypertension, and Urinary tract infection (08/2019).    SURGICAL HISTORY   has a past surgical history that includes breast biopsy (Left, 6/14/2018); other; anterior and posterior repair " (N/A, 9/3/2019); enterocele repair (N/A, 9/3/2019); bladder sling female (N/A, 9/3/2019); and vaginal suspension (N/A, 9/3/2019).    FAMILY HISTORY  Family History   Problem Relation Age of Onset    Stroke Father     Breast Cancer Sister     Alcohol abuse Brother        SOCIAL HISTORY  Social History     Tobacco Use    Smoking status: Never    Smokeless tobacco: Never   Vaping Use    Vaping status: Never Used   Substance and Sexual Activity    Alcohol use: No    Drug use: No    Sexual activity: Never     Partners: Male       CURRENT MEDICATIONS  Home Medications       Reviewed by Bhupinder Jackson (Pharmacy Tech) on 09/05/24 at 1517  Med List Status: Complete     Medication Last Dose Status   apixaban (ELIQUIS) 5mg Tab 9/5/2024 Active   losartan (COZAAR) 100 MG Tab 9/4/2024 Active   lovastatin (MEVACOR) 20 MG Tab 9/5/2024 Active   metoprolol SR (TOPROL XL) 25 MG TABLET SR 24 HR 9/5/2024 Active   nitrofurantoin (MACROBID) 100 MG Cap 8/31/2024 Active   torsemide (DEMADEX) 5 MG Tab 9/5/2024 Active                  Audit from Redirected Encounters    **Home medications have not yet been reviewed for this encounter**         ALLERGIES  Allergies   Allergen Reactions    Penicillins Shortness of Breath and Rash     Difficulty breathing rash       PHYSICAL EXAM  VITAL SIGNS: BP (!) 171/72   Pulse 71   Temp 36.9 °C (98.5 °F) (Temporal)   Resp (!) 31   Wt 69.4 kg (153 lb)   SpO2 98%   BMI 27.98 kg/m²    Constitutional:  Well developed, well nourished; No acute distress   HENT: Normocephalic, Atraumatic, Bilateral external ears normal, Oropharynx moist, No erythema or exudates in posterior oropharynx.   Eyes: PERRL, EOMI, Conjunctiva normal, No discharge.   Neck: Normal range of motion, supple, nontender.  Mild JVD  Lymphatic: No lymphadenopathy noted.   Cardiovascular: Normal heart rate, Normal rhythm, No murmurs, rubs or gallops   Thorax & Lungs: There are crackles bilateral bases.  Slight bronchial breath sounds upper  lung fields.  No wheezes or rhonchi.  No chest tenderness. No crepitus or subQ air  Abdomen: soft, good bowel sounds, no guarding no rebound, no masses, no pulsatile mass, no tenderness, no distention  Skin: Warm, Dry, No erythema, No rash.   Back: No tenderness, No CVA tenderness.   Extremities: 2+ dp and pt pulses bilateral LEs;  Nontender; no pretibial edema  Neurologic: Alert & oriented x 4, clear speech,   Psychiatric: appropriate, normal affect     EKG/LABS  Results for orders placed or performed during the hospital encounter of 09/05/24   Urinalysis, Culture if Indicated    Specimen: Urine, Clean Catch   Result Value Ref Range    Color Yellow     Character Clear     Specific Gravity 1.006 <1.035    Ph 5.5 5.0 - 8.0    Glucose Negative Negative mg/dL    Ketones Negative Negative mg/dL    Protein Negative Negative mg/dL    Bilirubin Negative Negative    Urobilinogen, Urine 0.2 Negative    Nitrite Negative Negative    Leukocyte Esterase Negative Negative    Occult Blood Negative Negative    Micro Urine Req see below    CBC WITH DIFFERENTIAL   Result Value Ref Range    WBC 9.8 4.8 - 10.8 K/uL    RBC 3.37 (L) 4.20 - 5.40 M/uL    Hemoglobin 11.4 (L) 12.0 - 16.0 g/dL    Hematocrit 34.6 (L) 37.0 - 47.0 %    .7 (H) 81.4 - 97.8 fL    MCH 33.8 (H) 27.0 - 33.0 pg    MCHC 32.9 32.2 - 35.5 g/dL    RDW 49.1 35.9 - 50.0 fL    Platelet Count 193 164 - 446 K/uL    MPV 10.2 9.0 - 12.9 fL    Neutrophils-Polys 59.40 44.00 - 72.00 %    Lymphocytes 30.90 22.00 - 41.00 %    Monocytes 7.20 0.00 - 13.40 %    Eosinophils 1.80 0.00 - 6.90 %    Basophils 0.40 0.00 - 1.80 %    Immature Granulocytes 0.30 0.00 - 0.90 %    Nucleated RBC 0.00 0.00 - 0.20 /100 WBC    Neutrophils (Absolute) 5.79 1.82 - 7.42 K/uL    Lymphs (Absolute) 3.02 1.00 - 4.80 K/uL    Monos (Absolute) 0.70 0.00 - 0.85 K/uL    Eos (Absolute) 0.18 0.00 - 0.51 K/uL    Baso (Absolute) 0.04 0.00 - 0.12 K/uL    Immature Granulocytes (abs) 0.03 0.00 - 0.11 K/uL    NRBC  (Absolute) 0.00 K/uL   COMP METABOLIC PANEL   Result Value Ref Range    Sodium 136 135 - 145 mmol/L    Potassium 4.6 3.6 - 5.5 mmol/L    Chloride 102 96 - 112 mmol/L    Co2 22 20 - 33 mmol/L    Anion Gap 12.0 7.0 - 16.0    Glucose 112 (H) 65 - 99 mg/dL    Bun 22 8 - 22 mg/dL    Creatinine 0.88 0.50 - 1.40 mg/dL    Calcium 9.4 8.5 - 10.5 mg/dL    Correct Calcium 9.7 8.5 - 10.5 mg/dL    AST(SGOT) 35 12 - 45 U/L    ALT(SGPT) 35 2 - 50 U/L    Alkaline Phosphatase 102 (H) 30 - 99 U/L    Total Bilirubin 0.5 0.1 - 1.5 mg/dL    Albumin 3.6 3.2 - 4.9 g/dL    Total Protein 7.0 6.0 - 8.2 g/dL    Globulin 3.4 1.9 - 3.5 g/dL    A-G Ratio 1.1 g/dL   TROPONIN   Result Value Ref Range    Troponin T 27 (H) 6 - 19 ng/L   proBrain Natriuretic Peptide, NT   Result Value Ref Range    NT-proBNP 3534 (H) 0 - 125 pg/mL   ESTIMATED GFR   Result Value Ref Range    GFR (CKD-EPI) 61 >60 mL/min/1.73 m 2   MAGNESIUM   Result Value Ref Range    Magnesium 2.1 1.5 - 2.5 mg/dL   TSH   Result Value Ref Range    TSH 2.620 0.350 - 5.500 uIU/mL   EKG (NOW)   Result Value Ref Range    Report       [facility] Emergency Dept.    Test Date:  2024  Pt Name:    VENERACION VILLAGRACIA       Department: ER  MRN:        [id number]                      Room:       [facility]  Gender:     Female                       Technician: [id number]  :        [id number]                   Requested By:LIBBY MARY  Order #:    [id number]                    Juliano MD:    Measurements  Intervals                                Axis  Rate:       89                           P:          61  TN:         254                          QRS:        43  QRSD:       108                          T:          49  QT:         392  QTc:        477    Interpretive Statements  Sinus rhythm  Prolonged TN interval  Probable left ventricular hypertrophy  Baseline wander in lead(s) II,III,aVF  Compared to ECG 2024 04:08:22  First degree AV block now present  Atrial  fibrillation no longer present     POC CoV-2, FLU A/B, RSV by PCR   Result Value Ref Range    POC Influenza A RNA, PCR Negative Negative    POC Influenza B RNA, PCR Negative Negative    POC RSV, by PCR Negative Negative    POC SARS-CoV-2, PCR DETECTED (AA) NotDetected      I have independently interpreted this EKG:  Please see my EKG interpretation below    RADIOLOGY/PROCEDURES   I have independently interpreted the diagnostic imaging associated with this visit and am waiting the final reading from the radiologist.     My preliminary interpretation is as follows: ER MD is reviewed the patient's chest x-ray.  She has fluid overload and cardiomegaly.    Radiologist interpretation:  DX-CHEST-PORTABLE (1 VIEW)   Final Result      1.  Cardiomegaly with pulmonary edema.      2.  Small bilateral pleural effusions.      EC-ECHOCARDIOGRAM COMPLETE W/O CONT    (Results Pending)       COURSE & MEDICAL DECISION MAKING    ASSESSMENT, COURSE AND PLAN  Care Narrative: Patient presents to the ER complaining of persistent dysuria despite taking Macrobid for urinary tract infection.  Patient was diagnosed with UTI at [facility] last week.  She completed her antibiotics but continues to have pain with urination.  No abdominal pain.  No back pain.  Urine here in the ER is clear.  Additionally, patient complained of shortness of breath which started this morning.  She describes increased shortness of breath every time she moves or walks.  Upon arrival her O2 sats were 90% on room air.  However, she downward trended to 83% on room air and is now oxygen requiring at 3 L.  She has a history of CHF with preserved ejection fraction.  Chest x-ray shows significant pulmonary edema with cardiomegaly.  She is positive for COVID.  Her BNP is elevated at 3534.  She was given some Lasix and nitroglycerin here in the ER to try to offload her.  She will need to be admitted for shortness of breath, likely due to combination of  COVID and a CHF exacerbation.  She has no chest pain.  Her EKG is nonacute.  Troponin is minimally elevated at 27.  At this time no concern for STEMI or non-STEMI.  I spoke with Dr. Dodson, hospitalist on-call, and he will kindly evaluate the patient for hospitalization.         Daughter reports that patient's O2 sat dropped down to 83% on room air.  She is currently on 3 L of oxygen maintaining in the mid 90s.  She has developed some increased work of breathing here in the ER, even since arrival.  She has oxygen at home, but only wears it at nighttime.  She typically does not need it during the day.    ADDITIONAL PROBLEMS MANAGED  Problem #1: Persistent dysuria  Problem #2: Shortness of breath since this morning    DISPOSITION AND DISCUSSIONS  I have discussed management of the patient with the following physicians and WILFREDO's: Hospitalist    Discussion of management with other QHP or appropriate source(s): None     Escalation of care considered, and ultimately not performed:IV fluids.  Patient has fluid overload.  She will not be given IV fluids at this time.    Barriers to care at this time, including but not limited to:  Patient is hard of hearing .     Decision tools and prescription drugs considered including, but not limited to: Antibiotics patient has COVID.  No need for antibiotics.  Additionally, urine is clear.  No evidence of UTI .    FINAL DIAGNOSIS  1. COVID-19 Acute   2. Congestive heart failure, unspecified HF chronicity, unspecified heart failure type (HCC) Acute   3. Hypoxia Acute        This dictation has been created using voice recognition software. The accuracy of the dictation is limited by the abilities of the software. I expect there may be some errors of grammar and possibly content. I made every attempt to manually correct the errors within my dictation. However, errors related to voice recognition software may still exist and should be interpreted within the appropriate context.      Electronically signed by: Keena Buckley M.D., 9/5/2024 12:02 PM

## 2024-09-06 ENCOUNTER — APPOINTMENT (OUTPATIENT)
Dept: CARDIOLOGY | Facility: MEDICAL CENTER | Age: 89
DRG: 177 | End: 2024-09-06
Attending: INTERNAL MEDICINE
Payer: MEDICARE

## 2024-09-06 LAB
ANION GAP SERPL CALC-SCNC: 14 MMOL/L (ref 7–16)
BUN SERPL-MCNC: 25 MG/DL (ref 8–22)
CALCIUM SERPL-MCNC: 10.2 MG/DL (ref 8.5–10.5)
CHLORIDE SERPL-SCNC: 97 MMOL/L (ref 96–112)
CO2 SERPL-SCNC: 21 MMOL/L (ref 20–33)
CREAT SERPL-MCNC: 0.95 MG/DL (ref 0.5–1.4)
ERYTHROCYTE [DISTWIDTH] IN BLOOD BY AUTOMATED COUNT: 46.9 FL (ref 35.9–50)
GFR SERPLBLD CREATININE-BSD FMLA CKD-EPI: 56 ML/MIN/1.73 M 2
GLUCOSE SERPL-MCNC: 184 MG/DL (ref 65–99)
HCT VFR BLD AUTO: 34.6 % (ref 37–47)
HGB BLD-MCNC: 11.7 G/DL (ref 12–16)
MCH RBC QN AUTO: 33.7 PG (ref 27–33)
MCHC RBC AUTO-ENTMCNC: 33.8 G/DL (ref 32.2–35.5)
MCV RBC AUTO: 99.7 FL (ref 81.4–97.8)
PLATELET # BLD AUTO: 210 K/UL (ref 164–446)
PMV BLD AUTO: 10.9 FL (ref 9–12.9)
POTASSIUM SERPL-SCNC: 4.6 MMOL/L (ref 3.6–5.5)
RBC # BLD AUTO: 3.47 M/UL (ref 4.2–5.4)
SODIUM SERPL-SCNC: 132 MMOL/L (ref 135–145)
TROPONIN T SERPL-MCNC: 30 NG/L (ref 6–19)
WBC # BLD AUTO: 8.6 K/UL (ref 4.8–10.8)

## 2024-09-06 PROCEDURE — 700102 HCHG RX REV CODE 250 W/ 637 OVERRIDE(OP): Performed by: HOSPITALIST

## 2024-09-06 PROCEDURE — 85027 COMPLETE CBC AUTOMATED: CPT

## 2024-09-06 PROCEDURE — 84484 ASSAY OF TROPONIN QUANT: CPT

## 2024-09-06 PROCEDURE — 700102 HCHG RX REV CODE 250 W/ 637 OVERRIDE(OP)

## 2024-09-06 PROCEDURE — 700111 HCHG RX REV CODE 636 W/ 250 OVERRIDE (IP): Mod: JZ | Performed by: HOSPITALIST

## 2024-09-06 PROCEDURE — 80048 BASIC METABOLIC PNL TOTAL CA: CPT

## 2024-09-06 PROCEDURE — 99232 SBSQ HOSP IP/OBS MODERATE 35: CPT | Performed by: INTERNAL MEDICINE

## 2024-09-06 PROCEDURE — 770020 HCHG ROOM/CARE - TELE (206)

## 2024-09-06 PROCEDURE — 36415 COLL VENOUS BLD VENIPUNCTURE: CPT

## 2024-09-06 PROCEDURE — A9270 NON-COVERED ITEM OR SERVICE: HCPCS | Performed by: HOSPITALIST

## 2024-09-06 PROCEDURE — A9270 NON-COVERED ITEM OR SERVICE: HCPCS

## 2024-09-06 RX ADMIN — HYDROXYZINE HYDROCHLORIDE 25 MG: 25 TABLET, FILM COATED ORAL at 21:11

## 2024-09-06 RX ADMIN — HYDROXYZINE HYDROCHLORIDE 25 MG: 25 TABLET, FILM COATED ORAL at 00:13

## 2024-09-06 RX ADMIN — APIXABAN 5 MG: 5 TABLET, FILM COATED ORAL at 16:46

## 2024-09-06 RX ADMIN — METOPROLOL SUCCINATE 25 MG: 25 TABLET, EXTENDED RELEASE ORAL at 05:29

## 2024-09-06 RX ADMIN — DEXAMETHASONE 6 MG: 4 TABLET ORAL at 05:29

## 2024-09-06 RX ADMIN — APIXABAN 5 MG: 5 TABLET, FILM COATED ORAL at 05:29

## 2024-09-06 RX ADMIN — FUROSEMIDE 20 MG: 10 INJECTION, SOLUTION INTRAVENOUS at 05:29

## 2024-09-06 RX ADMIN — LOVASTATIN 20 MG: 20 TABLET ORAL at 05:28

## 2024-09-06 RX ADMIN — FUROSEMIDE 20 MG: 10 INJECTION, SOLUTION INTRAVENOUS at 16:46

## 2024-09-06 RX ADMIN — LOSARTAN POTASSIUM 100 MG: 50 TABLET, FILM COATED ORAL at 16:46

## 2024-09-06 RX ADMIN — SENNOSIDES AND DOCUSATE SODIUM 2 TABLET: 50; 8.6 TABLET ORAL at 16:46

## 2024-09-06 RX ADMIN — POTASSIUM CHLORIDE 20 MEQ: 1500 TABLET, EXTENDED RELEASE ORAL at 16:46

## 2024-09-06 RX ADMIN — POTASSIUM CHLORIDE 20 MEQ: 1500 TABLET, EXTENDED RELEASE ORAL at 05:29

## 2024-09-06 ASSESSMENT — FIBROSIS 4 INDEX: FIB4 SCORE: 2.82

## 2024-09-06 ASSESSMENT — PAIN DESCRIPTION - PAIN TYPE: TYPE: ACUTE PAIN

## 2024-09-06 ASSESSMENT — COPD QUESTIONNAIRES
DO YOU EVER COUGH UP ANY MUCUS OR PHLEGM?: YES, A FEW DAYS A WEEK OR MONTH
COPD SCREENING SCORE: 4
DURING THE PAST 4 WEEKS HOW MUCH DID YOU FEEL SHORT OF BREATH: SOME OF THE TIME
HAVE YOU SMOKED AT LEAST 100 CIGARETTES IN YOUR ENTIRE LIFE: NO/DON'T KNOW

## 2024-09-06 ASSESSMENT — ENCOUNTER SYMPTOMS
SHORTNESS OF BREATH: 0
ABDOMINAL PAIN: 0
COUGH: 1

## 2024-09-06 NOTE — CARE PLAN
Problem: Knowledge Deficit - Standard  Goal: Patient and family/care givers will demonstrate understanding of plan of care, disease process/condition, diagnostic tests and medications  Description: Target End Date:  1-3 days or as soon as patient condition allows    Document in Patient Education    1.  Patient and family/caregiver oriented to unit, equipment, visitation policy and means for communicating concern  2.  Complete/review Learning Assessment  3.  Assess knowledge level of disease process/condition, treatment plan, diagnostic tests and medications  4.  Explain disease process/condition, treatment plan, diagnostic tests and medications  Outcome: Progressing   The patient is Stable - Low risk of patient condition declining or worsening    Shift Goals  Clinical Goals: POC/ comfort  Patient Goals: rest    Progress made toward(s) clinical / shift goals:      Patient is not progressing towards the following goals:

## 2024-09-06 NOTE — CARE PLAN
The patient is Stable - Low risk of patient condition declining or worsening    Shift Goals  Clinical Goals: discuss POC, answer questions/concerns, orient to unit/room  Patient Goals: get better    Progress made toward(s) clinical / shift goals:        Problem: Care Map:  Admission Optimal Outcome for the Heart Failure Patient  Goal: Admission:  Optimal Care of the heart failure patient  9/6/2024 0035 by Nidhi Ayon V, R.N.  Outcome: Progressing  9/6/2024 0035 by Nidhi Ayon V, R.N.  Outcome: Progressing     Problem: Care Map:  Day 1 Optimal Outcome for the Heart Failure Patient  Goal: Day 1:  Optimal Care of the heart failure patient  9/6/2024 0035 by Nidhi Ayon V, R.N.  Outcome: Progressing  9/6/2024 0035 by Nidhi Ayon V, R.N.  Outcome: Progressing     Problem: Care Map:  Day 2 Optimal Outcome for the Heart Failure Patient  Goal: Day 2:  Optimal Care of the heart failure patient  9/6/2024 0035 by Nidhi Ayon V, R.N.  Outcome: Progressing  9/6/2024 0035 by Nidhi Ayon V, R.N.  Outcome: Progressing     Problem: Care Map:  Day 3 Optimal Outcome for the Heart Failure Patient  Goal: Day 3:  Optimal Care of the heart failure patient  9/6/2024 0035 by Nidhi Ayon V, R.N.  Outcome: Progressing  9/6/2024 0035 by Nidhi Ayon V, R.N.  Outcome: Progressing     Problem: Care Map:  Day Before Discharge Optimal Outcome for the Heart Failure Patient  Goal: Day Before Discharge:  Optimal Care of the heart failure patient  9/6/2024 0035 by Nidhi Ayon V, R.N.  Outcome: Progressing  9/6/2024 0035 by Nidhi Ayon V, R.N.  Outcome: Progressing     Problem: Care Map:  Day of Discharge Optimal Outcome for the Heart Failure Patient  Goal: Day of Discharge:  Optimal Care of the heart failure patient  9/6/2024 0035 by Nidhi Ayon V, R.N.  Outcome: Progressing  9/6/2024 0035 by Nidhi Ayon V, R.N.  Outcome: Progressing     Problem: Knowledge Deficit - Standard  Goal: Patient and family/care givers will  demonstrate understanding of plan of care, disease process/condition, diagnostic tests and medications  9/6/2024 0035 by Nidhi SALAS R.N.  Outcome: Progressing  9/6/2024 0035 by Nidhi SALAS R.N.  Outcome: Progressing

## 2024-09-06 NOTE — PROGRESS NOTES
Monitor Summary     Rhythm: SR  Heart Rate: 81-89  Ectopy: R-O PVC  Measurement: .25/.09/.42

## 2024-09-06 NOTE — PROGRESS NOTES
4 Eyes Skin Assessment Completed by ALANNA Terrell and DENG Youngblood.    Head WDL  Ears WDL  Nose WDL  Mouth WDL  Neck WDL  Breast/Chest WDL  Shoulder Blades WDL  Spine WDL  (R) Arm/Elbow/Hand WDL  (L) Arm/Elbow/Hand WDL  Abdomen WDL  Groin WDL  Scrotum/Coccyx/Buttocks Discoloration  (R) Leg WDL  (L) Leg WDL  (R) Heel/Foot/Toe WDL  (L) Heel/Foot/Toe WDL          Devices In Places Tele Box, Blood Pressure Cuff, Pulse Ox, and Nasal Cannula      Interventions In Place NC W/Ear Foams, TAP System, Pillows, and Heels Loaded W/Pillows    Possible Skin Injury No    Pictures Uploaded Into Epic N/A  Wound Consult Placed N/A  RN Wound Prevention Protocol Ordered Yes

## 2024-09-06 NOTE — PROGRESS NOTES
Bedside report received from off going RN/tech: Trudy assumed care of patient.     Fall Risk Score: LOW RISK  Fall risk interventions in place: Place yellow fall risk ID band on patient, Provide patient/family education based on risk assessment, Educate patient/family to call staff for assistance when getting out of bed, Place fall precaution signage outside patient door, Place patient in room close to nursing station, Utilize bed/chair fall alarm, and Bed alarm connected correctly  Bed type: Regular (Abiel Score less than 17 interventions in place)  Patient on cardiac monitor: Yes  IVF/IV medications: Not Applicable   Oxygen: How many liters 3L and Traced the line to wall oxygen  Bedside sitter: Not Applicable   Isolation: Isolation precautions in place COVID+

## 2024-09-06 NOTE — PROGRESS NOTES
Hospital Medicine Daily Progress Note    Date of Service  9/6/2024    Chief Complaint  Veneracion Reyes Villagracia is a 89 y.o. female admitted 9/5/2024 with SOB    Hospital Course  89 yo woman HFpEF, A-fib, pulmonary hypertension, dyslipidemia, hypertension, and recurrent UTIs and recently treated for UTI who presented with lethargy and found to be hypoxic and COVID-positive.  Chest x-ray showed pulmonary edema with small bilateral pleural effusions.    Interval Problem Update  96% on 3 L.  Intermittently hypertensive  Troponin 27, 36, 30  TTE pending  Patient says she is feeling better, is hard of hearing  I updated her daughter Ashli, I did confirm with her that patient wants full code which has been discussed by her PCP as well    I have discussed this patient's plan of care and discharge plan at IDT rounds today with Case Management, Nursing, Nursing leadership, and other members of the IDT team.    Consultants/Specialty  none    Code Status  Full Code    Disposition  The patient is not medically cleared for discharge to home or a post-acute facility.  Anticipate discharge to: home with close outpatient follow-up    I have placed the appropriate orders for post-discharge needs.    Review of Systems  Review of Systems   Respiratory:  Positive for cough (Improved). Negative for shortness of breath.    Cardiovascular:  Negative for chest pain.   Gastrointestinal:  Negative for abdominal pain.        Physical Exam  Temp:  [36.3 °C (97.3 °F)-36.6 °C (97.9 °F)] 36.4 °C (97.5 °F)  Pulse:  [71-92] 80  Resp:  [16-32] 16  BP: (118-194)/(72-98) 155/79  SpO2:  [92 %-98 %] 92 %    Physical Exam  Vitals and nursing note reviewed.   Constitutional:       General: She is not in acute distress.     Appearance: She is not toxic-appearing.      Comments: Hard of hearing   HENT:      Head: Normocephalic.      Mouth/Throat:      Mouth: Mucous membranes are moist.   Eyes:      General:         Right eye: No discharge.         Left  eye: No discharge.   Cardiovascular:      Rate and Rhythm: Normal rate and regular rhythm.   Pulmonary:      Effort: No respiratory distress.      Breath sounds: Rales (Diffuse bilaterally) present. No wheezing.   Abdominal:      Palpations: Abdomen is soft.      Tenderness: There is no abdominal tenderness.   Musculoskeletal:         General: No swelling.      Cervical back: Neck supple.   Skin:     General: Skin is warm and dry.   Neurological:      Mental Status: She is alert and oriented to person, place, and time.         Fluids    Intake/Output Summary (Last 24 hours) at 9/6/2024 1353  Last data filed at 9/6/2024 1200  Gross per 24 hour   Intake 100 ml   Output 2250 ml   Net -2150 ml        Laboratory  Recent Labs     09/05/24  1339 09/06/24  0342   WBC 9.8 8.6   RBC 3.37* 3.47*   HEMOGLOBIN 11.4* 11.7*   HEMATOCRIT 34.6* 34.6*   .7* 99.7*   MCH 33.8* 33.7*   MCHC 32.9 33.8   RDW 49.1 46.9   PLATELETCT 193 210   MPV 10.2 10.9     Recent Labs     09/05/24  1339 09/06/24  0342   SODIUM 136 132*   POTASSIUM 4.6 4.6   CHLORIDE 102 97   CO2 22 21   GLUCOSE 112* 184*   BUN 22 25*   CREATININE 0.88 0.95   CALCIUM 9.4 10.2                   Imaging  DX-CHEST-PORTABLE (1 VIEW)   Final Result      1.  Cardiomegaly with pulmonary edema.      2.  Small bilateral pleural effusions.      EC-ECHOCARDIOGRAM COMPLETE W/O CONT    (Results Pending)        Assessment/Plan  * Acute respiratory failure due to COVID-19 (HCC)- (present on admission)  Assessment & Plan  Acute respiratory failure secondary to COVID-19 and concern of volume overload with elevated BNP  Decadron 6mg oral daily x 10 days  Titrate oxygen to keep SpO2 >89%, she is weaned down to 1 L  Continues to have crackles on exam.  Continue diuresis with Lasix.  TTE pending  Check procalcitonin      Macrocytic anemia  Assessment & Plan  Likely component of fatty liver  Monitor CBC    COVID-19  Assessment & Plan  see acute respiratory failure secondary to  COVID  Decadron  Possible need of baricitinib if worsens    Overweight (BMI 25.0-29.9)  Assessment & Plan  Body mass index is 27.98 kg/m².    Chronic diastolic heart failure (HCC)- (present on admission)  Assessment & Plan  Prior echocardiogram 2023 showed preserved EF 55% with elevated RVSP  TTE is pending  Continue Lasix  Monitor I's and O's, BMP, vitals    AF (atrial fibrillation) (HCC)- (present on admission)  Assessment & Plan  Rate control  Continue apixaban 5 mg twice a day  Monitoring on telemetry    Hearing loss- (present on admission)  Assessment & Plan  Hearing aids as needed  She speaks 2 languages her native tongue hears better with her daughter talking.    Dyslipidemia- (present on admission)  Assessment & Plan  Continue active management with lovastatin 20 mg daily         VTE prophylaxis:    therapeutic anticoagulation with eliquis 5 mg BID      I have performed a physical exam and reviewed and updated ROS and Plan today (9/6/2024). In review of yesterday's note (9/5/2024), there are no changes except as documented above.

## 2024-09-07 ENCOUNTER — APPOINTMENT (OUTPATIENT)
Dept: CARDIOLOGY | Facility: MEDICAL CENTER | Age: 89
DRG: 177 | End: 2024-09-07
Attending: INTERNAL MEDICINE
Payer: MEDICARE

## 2024-09-07 LAB
ALBUMIN SERPL BCP-MCNC: 3.6 G/DL (ref 3.2–4.9)
BACTERIA UR CULT: NORMAL
BASOPHILS # BLD AUTO: 0.2 % (ref 0–1.8)
BASOPHILS # BLD: 0.03 K/UL (ref 0–0.12)
BUN SERPL-MCNC: 41 MG/DL (ref 8–22)
CALCIUM ALBUM COR SERPL-MCNC: 9.4 MG/DL (ref 8.5–10.5)
CALCIUM SERPL-MCNC: 9.1 MG/DL (ref 8.5–10.5)
CHLORIDE SERPL-SCNC: 98 MMOL/L (ref 96–112)
CO2 SERPL-SCNC: 22 MMOL/L (ref 20–33)
CREAT SERPL-MCNC: 0.99 MG/DL (ref 0.5–1.4)
EOSINOPHIL # BLD AUTO: 0 K/UL (ref 0–0.51)
EOSINOPHIL NFR BLD: 0 % (ref 0–6.9)
ERYTHROCYTE [DISTWIDTH] IN BLOOD BY AUTOMATED COUNT: 48.2 FL (ref 35.9–50)
FLUAV RNA SPEC QL NAA+PROBE: NEGATIVE
FLUBV RNA SPEC QL NAA+PROBE: NEGATIVE
GFR SERPLBLD CREATININE-BSD FMLA CKD-EPI: 53 ML/MIN/1.73 M 2
GLUCOSE SERPL-MCNC: 130 MG/DL (ref 65–99)
HCT VFR BLD AUTO: 35.3 % (ref 37–47)
HGB BLD-MCNC: 11.8 G/DL (ref 12–16)
IMM GRANULOCYTES # BLD AUTO: 0.12 K/UL (ref 0–0.11)
IMM GRANULOCYTES NFR BLD AUTO: 0.6 % (ref 0–0.9)
LV EJECT FRACT MOD 2C 99903: 52.16
LV EJECT FRACT MOD 4C 99902: 54.57
LV EJECT FRACT MOD BP 99901: 54.25
LYMPHOCYTES # BLD AUTO: 2.52 K/UL (ref 1–4.8)
LYMPHOCYTES NFR BLD: 13 % (ref 22–41)
MCH RBC QN AUTO: 33.4 PG (ref 27–33)
MCHC RBC AUTO-ENTMCNC: 33.4 G/DL (ref 32.2–35.5)
MCV RBC AUTO: 100 FL (ref 81.4–97.8)
MONOCYTES # BLD AUTO: 1.03 K/UL (ref 0–0.85)
MONOCYTES NFR BLD AUTO: 5.3 % (ref 0–13.4)
NEUTROPHILS # BLD AUTO: 15.65 K/UL (ref 1.82–7.42)
NEUTROPHILS NFR BLD: 80.9 % (ref 44–72)
NRBC # BLD AUTO: 0 K/UL
NRBC BLD-RTO: 0 /100 WBC (ref 0–0.2)
PHOSPHATE SERPL-MCNC: 3.7 MG/DL (ref 2.5–4.5)
PLATELET # BLD AUTO: 231 K/UL (ref 164–446)
PMV BLD AUTO: 10.6 FL (ref 9–12.9)
POTASSIUM SERPL-SCNC: 4.6 MMOL/L (ref 3.6–5.5)
PROCALCITONIN SERPL-MCNC: <0.05 NG/ML
RBC # BLD AUTO: 3.53 M/UL (ref 4.2–5.4)
RSV RNA SPEC QL NAA+PROBE: NEGATIVE
SARS-COV-2 RNA RESP QL NAA+PROBE: NOTDETECTED
SIGNIFICANT IND 70042: NORMAL
SITE SITE: NORMAL
SODIUM SERPL-SCNC: 135 MMOL/L (ref 135–145)
SOURCE SOURCE: NORMAL
SPECIMEN SOURCE: NORMAL
WBC # BLD AUTO: 19.4 K/UL (ref 4.8–10.8)

## 2024-09-07 PROCEDURE — A9270 NON-COVERED ITEM OR SERVICE: HCPCS

## 2024-09-07 PROCEDURE — 93306 TTE W/DOPPLER COMPLETE: CPT

## 2024-09-07 PROCEDURE — 770020 HCHG ROOM/CARE - TELE (206)

## 2024-09-07 PROCEDURE — 700102 HCHG RX REV CODE 250 W/ 637 OVERRIDE(OP)

## 2024-09-07 PROCEDURE — 0241U HCHG SARS-COV-2 COVID-19 NFCT DS RESP RNA 4 TRGT MIC: CPT

## 2024-09-07 PROCEDURE — A9270 NON-COVERED ITEM OR SERVICE: HCPCS | Performed by: HOSPITALIST

## 2024-09-07 PROCEDURE — 700102 HCHG RX REV CODE 250 W/ 637 OVERRIDE(OP): Performed by: HOSPITALIST

## 2024-09-07 PROCEDURE — 93306 TTE W/DOPPLER COMPLETE: CPT | Mod: 26 | Performed by: INTERNAL MEDICINE

## 2024-09-07 PROCEDURE — 700102 HCHG RX REV CODE 250 W/ 637 OVERRIDE(OP): Performed by: INTERNAL MEDICINE

## 2024-09-07 PROCEDURE — 84145 PROCALCITONIN (PCT): CPT

## 2024-09-07 PROCEDURE — 36415 COLL VENOUS BLD VENIPUNCTURE: CPT

## 2024-09-07 PROCEDURE — 85025 COMPLETE CBC W/AUTO DIFF WBC: CPT

## 2024-09-07 PROCEDURE — 99232 SBSQ HOSP IP/OBS MODERATE 35: CPT | Performed by: INTERNAL MEDICINE

## 2024-09-07 PROCEDURE — 80069 RENAL FUNCTION PANEL: CPT

## 2024-09-07 PROCEDURE — A9270 NON-COVERED ITEM OR SERVICE: HCPCS | Performed by: INTERNAL MEDICINE

## 2024-09-07 PROCEDURE — 700111 HCHG RX REV CODE 636 W/ 250 OVERRIDE (IP): Mod: JZ | Performed by: HOSPITALIST

## 2024-09-07 RX ORDER — AMLODIPINE BESYLATE 5 MG/1
5 TABLET ORAL
Status: DISCONTINUED | OUTPATIENT
Start: 2024-09-07 | End: 2024-09-10 | Stop reason: HOSPADM

## 2024-09-07 RX ADMIN — DEXAMETHASONE 6 MG: 4 TABLET ORAL at 05:17

## 2024-09-07 RX ADMIN — Medication 5 MG: at 21:05

## 2024-09-07 RX ADMIN — LOVASTATIN 20 MG: 20 TABLET ORAL at 05:17

## 2024-09-07 RX ADMIN — POTASSIUM CHLORIDE 20 MEQ: 1500 TABLET, EXTENDED RELEASE ORAL at 16:33

## 2024-09-07 RX ADMIN — SENNOSIDES AND DOCUSATE SODIUM 2 TABLET: 50; 8.6 TABLET ORAL at 16:33

## 2024-09-07 RX ADMIN — FUROSEMIDE 20 MG: 10 INJECTION, SOLUTION INTRAVENOUS at 05:22

## 2024-09-07 RX ADMIN — APIXABAN 5 MG: 5 TABLET, FILM COATED ORAL at 16:33

## 2024-09-07 RX ADMIN — AMLODIPINE BESYLATE 5 MG: 5 TABLET ORAL at 16:33

## 2024-09-07 RX ADMIN — HYDROXYZINE HYDROCHLORIDE 25 MG: 25 TABLET, FILM COATED ORAL at 21:05

## 2024-09-07 RX ADMIN — FUROSEMIDE 20 MG: 10 INJECTION, SOLUTION INTRAVENOUS at 16:33

## 2024-09-07 RX ADMIN — APIXABAN 5 MG: 5 TABLET, FILM COATED ORAL at 05:17

## 2024-09-07 RX ADMIN — LOSARTAN POTASSIUM 100 MG: 50 TABLET, FILM COATED ORAL at 16:33

## 2024-09-07 RX ADMIN — METOPROLOL SUCCINATE 25 MG: 25 TABLET, EXTENDED RELEASE ORAL at 05:17

## 2024-09-07 RX ADMIN — POTASSIUM CHLORIDE 20 MEQ: 1500 TABLET, EXTENDED RELEASE ORAL at 05:17

## 2024-09-07 ASSESSMENT — PAIN DESCRIPTION - PAIN TYPE
TYPE: ACUTE PAIN
TYPE: ACUTE PAIN

## 2024-09-07 ASSESSMENT — ENCOUNTER SYMPTOMS
COUGH: 1
SHORTNESS OF BREATH: 0
ABDOMINAL PAIN: 0

## 2024-09-07 ASSESSMENT — FIBROSIS 4 INDEX: FIB4 SCORE: 2.59

## 2024-09-07 NOTE — CARE PLAN
The patient is Stable - Low risk of patient condition declining or worsening    Shift Goals  Clinical Goals: POC/ comfort  Patient Goals: rest    Progress made toward(s) clinical / shift goals:  Progressing      Problem: Knowledge Deficit - Standard  Goal: Patient and family/care givers will demonstrate understanding of plan of care, disease process/condition, diagnostic tests and medications  Outcome: Progressing  Note: Pt verbalizes understanding of plan of care

## 2024-09-07 NOTE — PROGRESS NOTES
Hospital Medicine Daily Progress Note    Date of Service  9/7/2024    Chief Complaint  Veneracion Reyes Villagracia is a 89 y.o. female admitted 9/5/2024 with SOB    Hospital Course  89 yo woman HFpEF, A-fib, pulmonary hypertension, dyslipidemia, hypertension, and recurrent UTIs and recently treated for UTI who presented with lethargy and found to be hypoxic and COVID-positive.  Chest x-ray showed pulmonary edema with small bilateral pleural effusions.    Interval Problem Update  1L O2  WBC 19.4.,  Procalcitonin is low.  Her symptoms continue to improve.  I discussed with her daughter    I have discussed this patient's plan of care and discharge plan at IDT rounds today with Case Management, Nursing, Nursing leadership, and other members of the IDT team.    Consultants/Specialty  none    Code Status  Full Code    Disposition  The patient is not medically cleared for discharge to home or a post-acute facility.  Anticipate discharge to: home with close outpatient follow-up    I have placed the appropriate orders for post-discharge needs.    Review of Systems  Review of Systems   Respiratory:  Positive for cough (Improved). Negative for shortness of breath.    Cardiovascular:  Negative for chest pain.   Gastrointestinal:  Negative for abdominal pain.        Physical Exam  Temp:  [36 °C (96.8 °F)-36.7 °C (98.1 °F)] 36.3 °C (97.3 °F)  Pulse:  [69-87] 69  Resp:  [15-18] 18  BP: (146-171)/(61-84) 146/61  SpO2:  [90 %-97 %] 97 %    Physical Exam  Vitals and nursing note reviewed.   Constitutional:       General: She is not in acute distress.     Appearance: She is not toxic-appearing.      Comments: Hard of hearing   HENT:      Head: Normocephalic.      Mouth/Throat:      Mouth: Mucous membranes are moist.   Eyes:      General:         Right eye: No discharge.         Left eye: No discharge.   Cardiovascular:      Rate and Rhythm: Normal rate and regular rhythm.   Pulmonary:      Effort: No respiratory distress.      Breath  sounds: Rales (Diffuse bilaterally) present. No wheezing.   Abdominal:      Palpations: Abdomen is soft.      Tenderness: There is no abdominal tenderness.   Musculoskeletal:         General: No swelling.      Cervical back: Neck supple.   Skin:     General: Skin is warm and dry.   Neurological:      Mental Status: She is alert.      Comments: Oriented to self and daughter         Fluids    Intake/Output Summary (Last 24 hours) at 9/7/2024 1516  Last data filed at 9/7/2024 0813  Gross per 24 hour   Intake 240 ml   Output 2000 ml   Net -1760 ml        Laboratory  Recent Labs     09/05/24  1339 09/06/24  0342 09/07/24  0452   WBC 9.8 8.6 19.4*   RBC 3.37* 3.47* 3.53*   HEMOGLOBIN 11.4* 11.7* 11.8*   HEMATOCRIT 34.6* 34.6* 35.3*   .7* 99.7* 100.0*   MCH 33.8* 33.7* 33.4*   MCHC 32.9 33.8 33.4   RDW 49.1 46.9 48.2   PLATELETCT 193 210 231   MPV 10.2 10.9 10.6     Recent Labs     09/05/24  1339 09/06/24  0342 09/07/24  0452   SODIUM 136 132* 135   POTASSIUM 4.6 4.6 4.6   CHLORIDE 102 97 98   CO2 22 21 22   GLUCOSE 112* 184* 130*   BUN 22 25* 41*   CREATININE 0.88 0.95 0.99   CALCIUM 9.4 10.2 9.1                   Imaging  DX-CHEST-PORTABLE (1 VIEW)   Final Result      1.  Cardiomegaly with pulmonary edema.      2.  Small bilateral pleural effusions.      EC-ECHOCARDIOGRAM COMPLETE W/O CONT    (Results Pending)        Assessment/Plan  * Acute respiratory failure due to COVID-19 (HCC)- (present on admission)  Assessment & Plan  Acute respiratory failure secondary to COVID-19 and concern of volume overload with elevated BNP  Decadron 6mg oral daily x 10 days  Titrate oxygen to keep SpO2 >89%, she is weaned down to 1-2L  Continues to have crackles on exam.  Continue diuresis with Lasix.  TTE pending  Check procalcitonin      Macrocytic anemia  Assessment & Plan  Likely component of fatty liver  Monitor CBC    COVID-19  Assessment & Plan  see acute respiratory failure secondary to COVID  Decadron  Possible need of  baricitinib if worsens    Overweight (BMI 25.0-29.9)  Assessment & Plan  Body mass index is 27.98 kg/m².    Chronic diastolic heart failure (HCC)- (present on admission)  Assessment & Plan  Prior echocardiogram 2023 showed preserved EF 55% with elevated RVSP  TTE is pending  Continue Lasix  Monitor I's and O's, BMP, vitals    AF (atrial fibrillation) (HCC)- (present on admission)  Assessment & Plan  Rate control  Continue apixaban 5 mg twice a day  Monitoring on telemetry    Hearing loss- (present on admission)  Assessment & Plan  Hearing aids as needed  She speaks 2 languages her native tongue hears better with her daughter talking.    Dyslipidemia- (present on admission)  Assessment & Plan  Continue active management with lovastatin 20 mg daily         VTE prophylaxis:    therapeutic anticoagulation with eliquis 5 mg BID      I have performed a physical exam and reviewed and updated ROS and Plan today (9/7/2024). In review of yesterday's note (9/6/2024), there are no changes except as documented above.

## 2024-09-07 NOTE — PROGRESS NOTES
Bedside report received from off going RN/tech: Lea, assumed care of patient.     Fall Risk Score: LOW RISK  Fall risk interventions in place: Place yellow fall risk ID band on patient, Provide patient/family education based on risk assessment, Educate patient/family to call staff for assistance when getting out of bed, Place fall precaution signage outside patient door, and Place patient in room close to nursing station  Bed type: Regular (Abiel Score less than 17 interventions in place)  Patient on cardiac monitor: Yes  IVF/IV medications: Not Applicable   Oxygen: How many liters 1L, Traced the line to wall oxygen, and No oxygen tank in room  Bedside sitter: Not Applicable   Isolation: Not applicable

## 2024-09-07 NOTE — PROGRESS NOTES
Monitor Summary  Rhythm: Sinus Rhythm with 1st degree HB  Rate: 65-90  Ectopy: PVC, PAC, prolonged Qt  .25 / .08 / .50

## 2024-09-08 LAB
ALBUMIN SERPL BCP-MCNC: 3.7 G/DL (ref 3.2–4.9)
BASOPHILS # BLD AUTO: 0.1 % (ref 0–1.8)
BASOPHILS # BLD: 0.02 K/UL (ref 0–0.12)
BUN SERPL-MCNC: 57 MG/DL (ref 8–22)
CALCIUM ALBUM COR SERPL-MCNC: 9.6 MG/DL (ref 8.5–10.5)
CALCIUM SERPL-MCNC: 9.4 MG/DL (ref 8.5–10.5)
CHLORIDE SERPL-SCNC: 94 MMOL/L (ref 96–112)
CO2 SERPL-SCNC: 18 MMOL/L (ref 20–33)
CREAT SERPL-MCNC: 1.17 MG/DL (ref 0.5–1.4)
EKG IMPRESSION: NORMAL
EOSINOPHIL # BLD AUTO: 0 K/UL (ref 0–0.51)
EOSINOPHIL NFR BLD: 0 % (ref 0–6.9)
ERYTHROCYTE [DISTWIDTH] IN BLOOD BY AUTOMATED COUNT: 47 FL (ref 35.9–50)
GFR SERPLBLD CREATININE-BSD FMLA CKD-EPI: 44 ML/MIN/1.73 M 2
GLUCOSE SERPL-MCNC: 156 MG/DL (ref 65–99)
HCT VFR BLD AUTO: 37.9 % (ref 37–47)
HGB BLD-MCNC: 12.4 G/DL (ref 12–16)
IMM GRANULOCYTES # BLD AUTO: 0.14 K/UL (ref 0–0.11)
IMM GRANULOCYTES NFR BLD AUTO: 0.8 % (ref 0–0.9)
LYMPHOCYTES # BLD AUTO: 2.84 K/UL (ref 1–4.8)
LYMPHOCYTES NFR BLD: 15.3 % (ref 22–41)
MCH RBC QN AUTO: 32.5 PG (ref 27–33)
MCHC RBC AUTO-ENTMCNC: 32.7 G/DL (ref 32.2–35.5)
MCV RBC AUTO: 99.2 FL (ref 81.4–97.8)
MONOCYTES # BLD AUTO: 1.3 K/UL (ref 0–0.85)
MONOCYTES NFR BLD AUTO: 7 % (ref 0–13.4)
NEUTROPHILS # BLD AUTO: 14.21 K/UL (ref 1.82–7.42)
NEUTROPHILS NFR BLD: 76.8 % (ref 44–72)
NRBC # BLD AUTO: 0 K/UL
NRBC BLD-RTO: 0 /100 WBC (ref 0–0.2)
PHOSPHATE SERPL-MCNC: 3.8 MG/DL (ref 2.5–4.5)
PLATELET # BLD AUTO: 237 K/UL (ref 164–446)
PMV BLD AUTO: 10.3 FL (ref 9–12.9)
POTASSIUM SERPL-SCNC: 4.9 MMOL/L (ref 3.6–5.5)
RBC # BLD AUTO: 3.82 M/UL (ref 4.2–5.4)
SODIUM SERPL-SCNC: 130 MMOL/L (ref 135–145)
WBC # BLD AUTO: 18.5 K/UL (ref 4.8–10.8)

## 2024-09-08 PROCEDURE — 93005 ELECTROCARDIOGRAM TRACING: CPT | Performed by: INTERNAL MEDICINE

## 2024-09-08 PROCEDURE — 93010 ELECTROCARDIOGRAM REPORT: CPT | Performed by: INTERNAL MEDICINE

## 2024-09-08 PROCEDURE — A9270 NON-COVERED ITEM OR SERVICE: HCPCS | Performed by: INTERNAL MEDICINE

## 2024-09-08 PROCEDURE — 36415 COLL VENOUS BLD VENIPUNCTURE: CPT

## 2024-09-08 PROCEDURE — A9270 NON-COVERED ITEM OR SERVICE: HCPCS

## 2024-09-08 PROCEDURE — 99232 SBSQ HOSP IP/OBS MODERATE 35: CPT | Performed by: INTERNAL MEDICINE

## 2024-09-08 PROCEDURE — 85025 COMPLETE CBC W/AUTO DIFF WBC: CPT

## 2024-09-08 PROCEDURE — 770020 HCHG ROOM/CARE - TELE (206)

## 2024-09-08 PROCEDURE — 700102 HCHG RX REV CODE 250 W/ 637 OVERRIDE(OP): Performed by: HOSPITALIST

## 2024-09-08 PROCEDURE — 700111 HCHG RX REV CODE 636 W/ 250 OVERRIDE (IP): Mod: JZ | Performed by: HOSPITALIST

## 2024-09-08 PROCEDURE — 80069 RENAL FUNCTION PANEL: CPT

## 2024-09-08 PROCEDURE — A9270 NON-COVERED ITEM OR SERVICE: HCPCS | Performed by: HOSPITALIST

## 2024-09-08 PROCEDURE — 700102 HCHG RX REV CODE 250 W/ 637 OVERRIDE(OP): Performed by: INTERNAL MEDICINE

## 2024-09-08 PROCEDURE — 700102 HCHG RX REV CODE 250 W/ 637 OVERRIDE(OP)

## 2024-09-08 RX ORDER — AZITHROMYCIN 250 MG/1
500 TABLET, FILM COATED ORAL DAILY
Status: COMPLETED | OUTPATIENT
Start: 2024-09-08 | End: 2024-09-10

## 2024-09-08 RX ADMIN — HYDROXYZINE HYDROCHLORIDE 25 MG: 25 TABLET, FILM COATED ORAL at 23:12

## 2024-09-08 RX ADMIN — LOVASTATIN 20 MG: 20 TABLET ORAL at 06:06

## 2024-09-08 RX ADMIN — AMLODIPINE BESYLATE 5 MG: 5 TABLET ORAL at 06:05

## 2024-09-08 RX ADMIN — METOPROLOL SUCCINATE 25 MG: 25 TABLET, EXTENDED RELEASE ORAL at 06:05

## 2024-09-08 RX ADMIN — DEXAMETHASONE 6 MG: 4 TABLET ORAL at 06:06

## 2024-09-08 RX ADMIN — POTASSIUM CHLORIDE 20 MEQ: 1500 TABLET, EXTENDED RELEASE ORAL at 06:06

## 2024-09-08 RX ADMIN — LOSARTAN POTASSIUM 100 MG: 50 TABLET, FILM COATED ORAL at 17:33

## 2024-09-08 RX ADMIN — AZITHROMYCIN DIHYDRATE 500 MG: 250 TABLET ORAL at 09:54

## 2024-09-08 RX ADMIN — SENNOSIDES AND DOCUSATE SODIUM 2 TABLET: 50; 8.6 TABLET ORAL at 17:33

## 2024-09-08 RX ADMIN — APIXABAN 5 MG: 5 TABLET, FILM COATED ORAL at 17:33

## 2024-09-08 RX ADMIN — APIXABAN 5 MG: 5 TABLET, FILM COATED ORAL at 06:06

## 2024-09-08 RX ADMIN — FUROSEMIDE 20 MG: 10 INJECTION, SOLUTION INTRAVENOUS at 06:07

## 2024-09-08 ASSESSMENT — ENCOUNTER SYMPTOMS
ABDOMINAL PAIN: 0
COUGH: 1
SHORTNESS OF BREATH: 0
WEAKNESS: 1

## 2024-09-08 ASSESSMENT — FIBROSIS 4 INDEX
FIB4 SCORE: 1.85
FIB4 SCORE: 2.3

## 2024-09-08 ASSESSMENT — PAIN DESCRIPTION - PAIN TYPE
TYPE: ACUTE PAIN

## 2024-09-08 NOTE — PROGRESS NOTES
Monitor summary    Rhythm: SR, A-fib  Rate: 60-74  Ectopy: rPVC, Coup, 1st degree HB  Measurements: .28/.05/.40

## 2024-09-08 NOTE — CARE PLAN
Problem: Knowledge Deficit - Standard  Goal: Patient and family/care givers will demonstrate understanding of plan of care, disease process/condition, diagnostic tests and medications  Description: Target End Date:  1-3 days or as soon as patient condition allows    Document in Patient Education    1.  Patient and family/caregiver oriented to unit, equipment, visitation policy and means for communicating concern  2.  Complete/review Learning Assessment  3.  Assess knowledge level of disease process/condition, treatment plan, diagnostic tests and medications  4.  Explain disease process/condition, treatment plan, diagnostic tests and medications  Outcome: Progressing   The patient is Stable - Low risk of patient condition declining or worsening    Shift Goals  Clinical Goals: monitor O2 levels  Patient Goals: rest2    Progress made toward(s) clinical / shift goals:      Patient is not progressing towards the following goals:

## 2024-09-08 NOTE — CARE PLAN
Problem: Care Map:  Day 3 Optimal Outcome for the Heart Failure Patient  Goal: Day 3:  Optimal Care of the heart failure patient  Description: Target End Date:  end of day 3  Outcome: Progressing     Problem: Fall Risk  Goal: Patient will remain free from falls  Description: Target End Date:  Prior to discharge or change in level of care    Document interventions on the Mesha Flor Fall Risk Assessment    1.  Assess for fall risk factors  2.  Implement fall precautions  Outcome: Progressing   The patient is Stable - Low risk of patient condition declining or worsening    Shift Goals  Clinical Goals: wean O2  Patient Goals: rest  Family Goals: updates    Progress made toward(s) clinical / shift goals:      Patient is not progressing towards the following goals:

## 2024-09-08 NOTE — PROGRESS NOTES
Hospital Medicine Daily Progress Note    Date of Service  9/8/2024    Chief Complaint  Veneracion Reyes Villagracia is a 89 y.o. female admitted 9/5/2024 with SOB    Hospital Course  89 yo woman HFpEF, A-fib, pulmonary hypertension, dyslipidemia, hypertension, and recurrent UTIs and recently treated for UTI who presented with lethargy and found to be hypoxic and COVID-positive.  Chest x-ray showed pulmonary edema with small bilateral pleural effusions.  TTE showed mild LVH, normal EF, mild TR.    Interval Problem Update  WBC 19.4.,  Procalcitonin is low.  Patient says overall her shortness of breath and cough is doing better.  Remains on 2 L O2 with crackles on exam.  Will trial a course of azithromycin.  Daughter is worried about her strength, PT eval ordered    I have discussed this patient's plan of care and discharge plan at IDT rounds today with Case Management, Nursing, Nursing leadership, and other members of the IDT team.    Consultants/Specialty  none    Code Status  Full Code    Disposition  The patient is not medically cleared for discharge to home or a post-acute facility.  Anticipate discharge to: home with close outpatient follow-up    I have placed the appropriate orders for post-discharge needs.    Review of Systems  Review of Systems   Constitutional:  Negative for malaise/fatigue.   Respiratory:  Positive for cough (Improved). Negative for shortness of breath.    Cardiovascular:  Negative for chest pain.   Gastrointestinal:  Negative for abdominal pain.   Neurological:  Positive for weakness.        Physical Exam  Temp:  [36.1 °C (97 °F)-36.6 °C (97.9 °F)] 36.1 °C (97 °F)  Pulse:  [62-73] 63  Resp:  [17-18] 17  BP: (128-163)/(60-79) 150/60  SpO2:  [95 %-98 %] 96 %    Physical Exam  Vitals and nursing note reviewed.   Constitutional:       General: She is not in acute distress.     Appearance: She is not toxic-appearing.      Comments: Hard of hearing   HENT:      Head: Normocephalic.       Mouth/Throat:      Mouth: Mucous membranes are moist.   Eyes:      General:         Right eye: No discharge.         Left eye: No discharge.   Cardiovascular:      Rate and Rhythm: Normal rate and regular rhythm.   Pulmonary:      Effort: No respiratory distress.      Breath sounds: Rales (Diffuse bilaterally) present. No wheezing.   Abdominal:      Palpations: Abdomen is soft.      Tenderness: There is no abdominal tenderness.   Musculoskeletal:         General: No swelling.      Cervical back: Neck supple.   Skin:     General: Skin is warm and dry.   Neurological:      Mental Status: She is alert.      Comments: Oriented to self and daughter         Fluids    Intake/Output Summary (Last 24 hours) at 9/8/2024 1227  Last data filed at 9/8/2024 1000  Gross per 24 hour   Intake --   Output 600 ml   Net -600 ml        Laboratory  Recent Labs     09/06/24 0342 09/07/24 0452 09/08/24  0107   WBC 8.6 19.4* 18.5*   RBC 3.47* 3.53* 3.82*   HEMOGLOBIN 11.7* 11.8* 12.4   HEMATOCRIT 34.6* 35.3* 37.9   MCV 99.7* 100.0* 99.2*   MCH 33.7* 33.4* 32.5   MCHC 33.8 33.4 32.7   RDW 46.9 48.2 47.0   PLATELETCT 210 231 237   MPV 10.9 10.6 10.3     Recent Labs     09/06/24 0342 09/07/24  0452 09/08/24  0107   SODIUM 132* 135 130*   POTASSIUM 4.6 4.6 4.9   CHLORIDE 97 98 94*   CO2 21 22 18*   GLUCOSE 184* 130* 156*   BUN 25* 41* 57*   CREATININE 0.95 0.99 1.17   CALCIUM 10.2 9.1 9.4                   Imaging  EC-ECHOCARDIOGRAM COMPLETE W/O CONT   Final Result      DX-CHEST-PORTABLE (1 VIEW)   Final Result      1.  Cardiomegaly with pulmonary edema.      2.  Small bilateral pleural effusions.           Assessment/Plan  * Acute respiratory failure due to COVID-19 (HCC)- (present on admission)  Assessment & Plan  Acute respiratory failure secondary to COVID-19 and concern of volume overload with elevated BNP  Decadron 6mg oral daily x 10 days  Titrate oxygen to keep SpO2 >89%, she is weaned down to 1-2L  Continues to have crackles on  exam.  Holding Lasix because of rising creatinine  Persistent leukocytosis, course of azithromycin      Macrocytic anemia  Assessment & Plan  Hemoglobin within normal limits    COVID-19  Assessment & Plan  see acute respiratory failure secondary to COVID  Decadron  Persistent leukocytosis, course of azithromycin started    Overweight (BMI 25.0-29.9)  Assessment & Plan  Body mass index is 27.98 kg/m².    Chronic diastolic heart failure (HCC)- (present on admission)  Assessment & Plan   TTE showed mild LVH, normal EF, mild TR.  Hold Lasix for rising creatinine  Monitor I's and O's, BMP, vitals    AF (atrial fibrillation) (HCC)- (present on admission)  Assessment & Plan  Rate control  Continue apixaban 5 mg twice a day  Monitoring on telemetry    Hearing loss- (present on admission)  Assessment & Plan  Hearing aids as needed  She speaks 2 languages her native tongue hears better with her daughter talking.    Dyslipidemia- (present on admission)  Assessment & Plan  Continue active management with lovastatin 20 mg daily         VTE prophylaxis:    therapeutic anticoagulation with eliquis 5 mg BID      I have performed a physical exam and reviewed and updated ROS and Plan today (9/8/2024). In review of yesterday's note (9/7/2024), there are no changes except as documented above.

## 2024-09-08 NOTE — PROGRESS NOTES
Bedside report received from off going RN/tech: Lea, assumed care of patient.     Fall Risk Score: LOW RISK  Fall risk interventions in place: Place yellow fall risk ID band on patient, Provide patient/family education based on risk assessment, Educate patient/family to call staff for assistance when getting out of bed, Place fall precaution signage outside patient door, Place patient in room close to nursing station, Utilize bed/chair fall alarm, Notify charge of high risk for huddle, and Bed alarm connected correctly  Bed type: Regular (Abiel Score less than 17 interventions in place)  Patient on cardiac monitor: Yes  IVF/IV medications: Not Applicable   Oxygen: How many liters 1L, Traced the line to wall oxygen, and No oxygen tank in room  Bedside sitter: Not Applicable   Isolation: Isolation precautions in place

## 2024-09-08 NOTE — PROGRESS NOTES
Monitor Summary:   Rhythm: sr  Rate: 61-85  Measurement: .28/.09/.44  Ectopy: r pvc, rare pac,

## 2024-09-09 LAB
ALBUMIN SERPL BCP-MCNC: 3.8 G/DL (ref 3.2–4.9)
APPEARANCE UR: ABNORMAL
BACTERIA #/AREA URNS HPF: ABNORMAL /HPF
BASOPHILS # BLD AUTO: 0.1 % (ref 0–1.8)
BASOPHILS # BLD: 0.02 K/UL (ref 0–0.12)
BILIRUB UR QL STRIP.AUTO: NEGATIVE
BUN SERPL-MCNC: 60 MG/DL (ref 8–22)
CALCIUM ALBUM COR SERPL-MCNC: 9.3 MG/DL (ref 8.5–10.5)
CALCIUM SERPL-MCNC: 9.1 MG/DL (ref 8.5–10.5)
CHLORIDE SERPL-SCNC: 95 MMOL/L (ref 96–112)
CO2 SERPL-SCNC: 18 MMOL/L (ref 20–33)
COLOR UR: YELLOW
CREAT SERPL-MCNC: 1.5 MG/DL (ref 0.5–1.4)
CREAT UR-MCNC: 47.37 MG/DL
EOSINOPHIL # BLD AUTO: 0.01 K/UL (ref 0–0.51)
EOSINOPHIL NFR BLD: 0.1 % (ref 0–6.9)
EPI CELLS #/AREA URNS HPF: ABNORMAL /HPF
ERYTHROCYTE [DISTWIDTH] IN BLOOD BY AUTOMATED COUNT: 46.5 FL (ref 35.9–50)
GFR SERPLBLD CREATININE-BSD FMLA CKD-EPI: 32 ML/MIN/1.73 M 2
GLUCOSE SERPL-MCNC: 181 MG/DL (ref 65–99)
GLUCOSE UR STRIP.AUTO-MCNC: NEGATIVE MG/DL
HCT VFR BLD AUTO: 41.1 % (ref 37–47)
HGB BLD-MCNC: 14 G/DL (ref 12–16)
HYALINE CASTS #/AREA URNS LPF: ABNORMAL /LPF
IMM GRANULOCYTES # BLD AUTO: 0.18 K/UL (ref 0–0.11)
IMM GRANULOCYTES NFR BLD AUTO: 1 % (ref 0–0.9)
KETONES UR STRIP.AUTO-MCNC: NEGATIVE MG/DL
LEUKOCYTE ESTERASE UR QL STRIP.AUTO: ABNORMAL
LYMPHOCYTES # BLD AUTO: 3.32 K/UL (ref 1–4.8)
LYMPHOCYTES NFR BLD: 18.4 % (ref 22–41)
MCH RBC QN AUTO: 33.5 PG (ref 27–33)
MCHC RBC AUTO-ENTMCNC: 34.1 G/DL (ref 32.2–35.5)
MCV RBC AUTO: 98.3 FL (ref 81.4–97.8)
MICRO URNS: ABNORMAL
MONOCYTES # BLD AUTO: 1.12 K/UL (ref 0–0.85)
MONOCYTES NFR BLD AUTO: 6.2 % (ref 0–13.4)
NEUTROPHILS # BLD AUTO: 13.4 K/UL (ref 1.82–7.42)
NEUTROPHILS NFR BLD: 74.2 % (ref 44–72)
NITRITE UR QL STRIP.AUTO: NEGATIVE
NRBC # BLD AUTO: 0 K/UL
NRBC BLD-RTO: 0 /100 WBC (ref 0–0.2)
PH UR STRIP.AUTO: 5 [PH] (ref 5–8)
PHOSPHATE SERPL-MCNC: 4 MG/DL (ref 2.5–4.5)
PLATELET # BLD AUTO: 294 K/UL (ref 164–446)
PMV BLD AUTO: 10.3 FL (ref 9–12.9)
POTASSIUM SERPL-SCNC: 4.8 MMOL/L (ref 3.6–5.5)
PROCALCITONIN SERPL-MCNC: <0.05 NG/ML
PROT UR QL STRIP: NEGATIVE MG/DL
RBC # BLD AUTO: 4.18 M/UL (ref 4.2–5.4)
RBC # URNS HPF: ABNORMAL /HPF
RBC UR QL AUTO: NEGATIVE
SODIUM SERPL-SCNC: 130 MMOL/L (ref 135–145)
SODIUM UR-SCNC: <20 MMOL/L
SP GR UR STRIP.AUTO: 1.01
UROBILINOGEN UR STRIP.AUTO-MCNC: 0.2 MG/DL
WBC # BLD AUTO: 18.1 K/UL (ref 4.8–10.8)
WBC #/AREA URNS HPF: ABNORMAL /HPF

## 2024-09-09 PROCEDURE — 97535 SELF CARE MNGMENT TRAINING: CPT

## 2024-09-09 PROCEDURE — 85025 COMPLETE CBC W/AUTO DIFF WBC: CPT

## 2024-09-09 PROCEDURE — 700102 HCHG RX REV CODE 250 W/ 637 OVERRIDE(OP): Performed by: INTERNAL MEDICINE

## 2024-09-09 PROCEDURE — 97162 PT EVAL MOD COMPLEX 30 MIN: CPT

## 2024-09-09 PROCEDURE — 700102 HCHG RX REV CODE 250 W/ 637 OVERRIDE(OP)

## 2024-09-09 PROCEDURE — 36415 COLL VENOUS BLD VENIPUNCTURE: CPT

## 2024-09-09 PROCEDURE — 80069 RENAL FUNCTION PANEL: CPT

## 2024-09-09 PROCEDURE — 82570 ASSAY OF URINE CREATININE: CPT

## 2024-09-09 PROCEDURE — A9270 NON-COVERED ITEM OR SERVICE: HCPCS | Performed by: HOSPITALIST

## 2024-09-09 PROCEDURE — 700102 HCHG RX REV CODE 250 W/ 637 OVERRIDE(OP): Performed by: HOSPITALIST

## 2024-09-09 PROCEDURE — 99232 SBSQ HOSP IP/OBS MODERATE 35: CPT | Performed by: INTERNAL MEDICINE

## 2024-09-09 PROCEDURE — 84145 PROCALCITONIN (PCT): CPT

## 2024-09-09 PROCEDURE — 84300 ASSAY OF URINE SODIUM: CPT

## 2024-09-09 PROCEDURE — 770020 HCHG ROOM/CARE - TELE (206)

## 2024-09-09 PROCEDURE — A9270 NON-COVERED ITEM OR SERVICE: HCPCS

## 2024-09-09 PROCEDURE — A9270 NON-COVERED ITEM OR SERVICE: HCPCS | Performed by: INTERNAL MEDICINE

## 2024-09-09 PROCEDURE — 81001 URINALYSIS AUTO W/SCOPE: CPT

## 2024-09-09 RX ORDER — METOPROLOL TARTRATE 25 MG/1
12.5 TABLET, FILM COATED ORAL 2 TIMES DAILY
Status: DISCONTINUED | OUTPATIENT
Start: 2024-09-09 | End: 2024-09-10 | Stop reason: HOSPADM

## 2024-09-09 RX ADMIN — LOVASTATIN 20 MG: 20 TABLET ORAL at 04:29

## 2024-09-09 RX ADMIN — APIXABAN 2.5 MG: 2.5 TABLET, FILM COATED ORAL at 17:19

## 2024-09-09 RX ADMIN — APIXABAN 5 MG: 5 TABLET, FILM COATED ORAL at 04:29

## 2024-09-09 RX ADMIN — DEXAMETHASONE 6 MG: 4 TABLET ORAL at 04:29

## 2024-09-09 RX ADMIN — LOSARTAN POTASSIUM 100 MG: 50 TABLET, FILM COATED ORAL at 17:19

## 2024-09-09 RX ADMIN — Medication 5 MG: at 21:53

## 2024-09-09 RX ADMIN — AMLODIPINE BESYLATE 5 MG: 5 TABLET ORAL at 04:29

## 2024-09-09 RX ADMIN — AZITHROMYCIN DIHYDRATE 500 MG: 250 TABLET ORAL at 04:29

## 2024-09-09 ASSESSMENT — COGNITIVE AND FUNCTIONAL STATUS - GENERAL
SUGGESTED CMS G CODE MODIFIER MOBILITY: CK
TURNING FROM BACK TO SIDE WHILE IN FLAT BAD: A LITTLE
MOBILITY SCORE: 18
CLIMB 3 TO 5 STEPS WITH RAILING: A LITTLE
WALKING IN HOSPITAL ROOM: A LITTLE
MOVING FROM LYING ON BACK TO SITTING ON SIDE OF FLAT BED: A LITTLE
STANDING UP FROM CHAIR USING ARMS: A LITTLE
MOVING TO AND FROM BED TO CHAIR: A LITTLE

## 2024-09-09 ASSESSMENT — ENCOUNTER SYMPTOMS
FEVER: 0
PALPITATIONS: 0
WEIGHT LOSS: 0
CHILLS: 0
COUGH: 1
MYALGIAS: 0
SHORTNESS OF BREATH: 0
DIARRHEA: 0
DIZZINESS: 0
WEAKNESS: 0
ABDOMINAL PAIN: 0

## 2024-09-09 ASSESSMENT — GAIT ASSESSMENTS
DEVIATION: BRADYKINETIC;SHUFFLED GAIT
DISTANCE (FEET): 150
GAIT LEVEL OF ASSIST: STANDBY ASSIST
ASSISTIVE DEVICE: FRONT WHEEL WALKER

## 2024-09-09 ASSESSMENT — PAIN DESCRIPTION - PAIN TYPE
TYPE: ACUTE PAIN

## 2024-09-09 ASSESSMENT — FIBROSIS 4 INDEX: FIB4 SCORE: 1.85

## 2024-09-09 NOTE — PROGRESS NOTES
Hospital Medicine Daily Progress Note    Date of Service  9/9/2024    Chief Complaint  Veneracion Reyes Villagracia is a 89 y.o. female admitted 9/5/2024 with SOB    Hospital Course  89 yo woman HFpEF, A-fib, pulmonary hypertension, dyslipidemia, hypertension, and recurrent UTIs and recently treated for UTI who presented with lethargy and found to be hypoxic and COVID-positive.  Chest x-ray showed pulmonary edema with small bilateral pleural effusions.  TTE showed mild LVH, normal EF, mild TR.    Interval Problem Update  Patient denies any shortness of breath  Creatinine is increased to 1.5 today.  Patient says she has been afraid to drink too much fluids.  I encouraged her to drink more today.  Her daughter is also a nurse with nephrology.  I consulted Dr. Rizo  Patient is weaned off oxygen during the day, she does needed at night.  She already has nocturnal O2 at home  Her heart rate has been in the 40-50s, blood pressure is also low range.  I have changed her metoprolol with hold parameters, but may need to discontinue on discharge.  I discussed with cardiology EDGAR Curry who agrees with the plan.    I have discussed this patient's plan of care and discharge plan at IDT rounds today with Case Management, Nursing, Nursing leadership, and other members of the IDT team.    Consultants/Specialty  Nephrology    Code Status  Full Code    Disposition  The patient is not medically cleared for discharge to home or a post-acute facility.  Anticipate discharge to: home with close outpatient follow-up    I have placed the appropriate orders for post-discharge needs.    Review of Systems  Review of Systems   Constitutional:  Negative for malaise/fatigue.   Respiratory:  Positive for cough (Improved). Negative for shortness of breath.    Cardiovascular:  Negative for chest pain.   Gastrointestinal:  Negative for abdominal pain.   Neurological:  Negative for weakness.        Physical Exam  Temp:  [36.2 °C (97.2  °F)-36.5 °C (97.7 °F)] 36.3 °C (97.3 °F)  Pulse:  [50-65] 51  Resp:  [16-20] 18  BP: ()/(47-68) 98/47  SpO2:  [92 %-98 %] 94 %    Physical Exam  Vitals and nursing note reviewed.   Constitutional:       General: She is not in acute distress.     Appearance: She is not toxic-appearing.      Comments: Hard of hearing   HENT:      Head: Normocephalic.      Mouth/Throat:      Mouth: Mucous membranes are moist.   Eyes:      General:         Right eye: No discharge.         Left eye: No discharge.   Cardiovascular:      Rate and Rhythm: Normal rate and regular rhythm.   Pulmonary:      Effort: No respiratory distress.      Breath sounds: No wheezing or rales.   Abdominal:      Palpations: Abdomen is soft.      Tenderness: There is no abdominal tenderness.   Musculoskeletal:         General: No swelling.      Cervical back: Neck supple.   Skin:     General: Skin is warm and dry.   Neurological:      Mental Status: She is alert.      Comments: Oriented to self and daughter         Fluids    Intake/Output Summary (Last 24 hours) at 9/9/2024 1625  Last data filed at 9/9/2024 0912  Gross per 24 hour   Intake 480 ml   Output 1900 ml   Net -1420 ml        Laboratory  Recent Labs     09/07/24  0452 09/08/24  0107 09/09/24  0127   WBC 19.4* 18.5* 18.1*   RBC 3.53* 3.82* 4.18*   HEMOGLOBIN 11.8* 12.4 14.0   HEMATOCRIT 35.3* 37.9 41.1   .0* 99.2* 98.3*   MCH 33.4* 32.5 33.5*   MCHC 33.4 32.7 34.1   RDW 48.2 47.0 46.5   PLATELETCT 231 237 294   MPV 10.6 10.3 10.3     Recent Labs     09/07/24  0452 09/08/24  0107 09/09/24  0127   SODIUM 135 130* 130*   POTASSIUM 4.6 4.9 4.8   CHLORIDE 98 94* 95*   CO2 22 18* 18*   GLUCOSE 130* 156* 181*   BUN 41* 57* 60*   CREATININE 0.99 1.17 1.50*   CALCIUM 9.1 9.4 9.1                   Imaging  EC-ECHOCARDIOGRAM COMPLETE W/O CONT   Final Result      DX-CHEST-PORTABLE (1 VIEW)   Final Result      1.  Cardiomegaly with pulmonary edema.      2.  Small bilateral pleural effusions.            Assessment/Plan  * Acute respiratory failure due to COVID-19 (HCC)- (present on admission)  Assessment & Plan  Acute respiratory failure secondary to COVID-19 and concern of volume overload with elevated BNP  Decadron 6mg oral daily x 10 days  Titrate oxygen to keep SpO2 >89%, she is weaned down to room air during the day  Holding Lasix because of rising creatinine  Complete course of azithromycin      Macrocytic anemia  Assessment & Plan  Hemoglobin within normal limits    COVID-19  Assessment & Plan  see acute respiratory failure secondary to COVID  Decadron  Persistent leukocytosis, course of azithromycin started    Overweight (BMI 25.0-29.9)  Assessment & Plan  Body mass index is 27.98 kg/m².    Chronic kidney disease, stage 3a- (present on admission)  Assessment & Plan  Creatinine is rising with hyponatremia  Nephrology consulted  Holding diuretics and encourage oral intake  Trend BMP, check urine lytes    Chronic diastolic heart failure (HCC)- (present on admission)  Assessment & Plan   TTE showed mild LVH, normal EF, mild TR.  Dehydrated, holding diuresis  Monitor I's and O's, BMP, vitals    AF (atrial fibrillation) ([facility])- (present on admission)  Assessment & Plan  Bradycardic, changing metoprolol to twice daily with hold parameters  Continue apixaban 5 mg twice a day  Monitoring on telemetry    Hearing loss- (present on admission)  Assessment & Plan  Hearing aids as needed  She speaks 2 languages her native tongue hears better with her daughter talking.    Dyslipidemia- (present on admission)  Assessment & Plan  Continue active management with lovastatin 20 mg daily         VTE prophylaxis:    therapeutic anticoagulation with eliquis 2.5 mg BID      I have performed a physical exam and reviewed and updated ROS and Plan today (9/9/2024). In review of yesterday's note (9/8/2024), there are no changes except as documented above.

## 2024-09-09 NOTE — DISCHARGE PLANNING
Case Management Discharge Planning    Admission Date: 9/5/2024  GMLOS: 5  ALOS: 4    6-Clicks ADL Score: 22  6-Clicks Mobility Score: 21      Anticipated Discharge Dispo: Discharge Disposition: Discharged to home/self care (01)  Discharge Address: [address] [place] [address]   [place] [address]    DME Needed: No    Action(s) Taken: DC Assessment Complete (See below)     RN SCOOBY met with patient and daughter Ashli who is patient uses a Rollator at baseline. Pt is able to cook and is independent with ADLs/IADLs. Pt uses 2L Nocturnal oxygen and has a concentrator at home.    Patient's PCP is Betty HERNÁNDEZ; preferred pharmacy is [facility]. Insurance coverage confirmed via Medicare and Medicaid FFS.    Patient does not smoke, drink alcohol or use recreational drugs. Pt is on medication for Anxiety. No other MH concerns reported.     Patient's daughter Ashli stated she is an ICU nurse and her sister is a CNA. There will be no need for home health for this patient as she has plenty of support from her children.     Pending PT/OT evaluation and hospital course. Patient will have transport home provided by daughter at discharge.    Escalations Completed: None    Medically Clear: No    Next Steps: F/U with medical team regarding D/C needs/ barriers.     Barriers to Discharge:     Is the patient up for discharge tomorrow: No     Care Transition Team Assessment    Information Source  Orientation Level: Oriented X4  Information Given By: Patient  Informant's Name: Ashli Encinas(JAYLAA)  Who is responsible for making decisions for patient? : POA  Name(s) of Primary Decision Maker: ASHLI ENCINAS    Readmission Evaluation  Is this a readmission?: No    Elopement Risk  Legal Hold: No  Ambulatory or Self Mobile in Wheelchair: Yes  Disoriented: No  Psychiatric Symptoms: None  History of Wandering: No  Elopement this Admit: No  Vocalizing Wanting to Leave: No  Displays Behaviors, Body Language Wanting to Leave: No-Not at Risk for Elopement  Elopement Risk: Not  at Risk for Elopement    Interdisciplinary Discharge Planning  Lives with - Patient's Self Care Capacity: Adult Children (3 people live in house)  Patient or legal guardian wants to designate a caregiver: No  Support Systems: Children, Family Member(s)  Housing / Facility: 2 Story House    Discharge Preparedness  What is your plan after discharge?: Home with help  What are your discharge supports?: Child  Prior Functional Level: Ambulatory, Independent with Activities of Daily Living, Independent with Medication Management    Functional Assesment  Prior Functional Level: Ambulatory, Independent with Activities of Daily Living, Independent with Medication Management    Finances  Financial Barriers to Discharge: No  Prescription Coverage: Yes    Vision / Hearing Impairment  Vision Impairment : Yes  Right Eye Vision: Impaired, Wears Glasses  Left Eye Vision: Impaired, Wears Glasses  Hearing Impairment : Yes  Hearing Impairment: Hearing Device(s) Available, Both Ears  Does Pt Need Special Equipment for the Hearing Impaired?: No    Advance Directive  Advance Directive?:  (Advanced Directive)    Domestic Abuse  Have you ever been the victim of abuse or violence?: No  Possible Abuse/Neglect Reported to:: Not Applicable    Psychological Assessment  History of Substance Abuse: None  History of Psychiatric Problems: No    Discharge Risks or Barriers  Discharge risks or barriers?: Complex medical needs  Patient risk factors: Complex medical needs    Anticipated Discharge Information  Discharge Disposition: Discharged to home/self care (01)  Discharge Address: [address] [place] [address] [place] [address]

## 2024-09-09 NOTE — CARE PLAN
The patient is Stable - Low risk of patient condition declining or worsening    Shift Goals  Clinical Goals: Monitor O2 levels, trend labs, and safety  Patient Goals: Rest  Family Goals: Updates    Progress made toward(s) clinical / shift goals:    Problem: Care Map:  Day 3 Optimal Outcome for the Heart Failure Patient  Goal: Day 3:  Optimal Care of the heart failure patient  Description: Target End Date:  end of day 3  Outcome: Progressing     Problem: Knowledge Deficit - Standard  Goal: Patient and family/care givers will demonstrate understanding of plan of care, disease process/condition, diagnostic tests and medications  Description: Target End Date:  1-3 days or as soon as patient condition allows    Document in Patient Education    1.  Patient and family/caregiver oriented to unit, equipment, visitation policy and means for communicating concern  2.  Complete/review Learning Assessment  3.  Assess knowledge level of disease process/condition, treatment plan, diagnostic tests and medications  4.  Explain disease process/condition, treatment plan, diagnostic tests and medications  Outcome: Progressing     Problem: Fall Risk  Goal: Patient will remain free from falls  Description: Target End Date:  Prior to discharge or change in level of care    Document interventions on the [facility] [place] Fall Risk Assessment    1.  Assess for fall risk factors  2.  Implement fall precautions  Outcome: Progressing       Patient is not progressing towards the following goals:

## 2024-09-09 NOTE — ASSESSMENT & PLAN NOTE
Creatinine is rising with hyponatremia  Nephrology consulted  Holding diuretics and encourage oral intake  Trend BMP, check urine lytes

## 2024-09-09 NOTE — PROGRESS NOTES
Monitor Summary:   Rhythm: sfib  Rate: 58-69  Measurement: .18/.09/.19  Ectopy: rare pvc

## 2024-09-09 NOTE — PROGRESS NOTES
Monitor Summary:  Rate: 52-60  Rhythm: Afib  Ectopy: PVC, Trig  Measurements: -/ .07/-

## 2024-09-09 NOTE — CONSULTS
NEPHROLOGY CONSULTATION NOTE               Author: Enmanuel Davis D.O.    Consulted By:    Date of Service  9/8/2024     Chief Complaint  Veneracion Reyes Villagracia is a 89 y.o. female admitted 9/5/2024 with SOB     Hospital Course  89 yo woman HFpEF, A-fib, pulmonary hypertension, dyslipidemia, hypertension, and recurrent UTIs presented with lethargy and found to be hypoxic and COVID-positive.  Chest x-ray showed pulmonary edema with small bilateral pleural effusions.  TTE showed mild LVH, normal EF, mild TR.     Interval Problem Update  WBC 18.1, Creatinine uptrend to 1.5 from 1.17 yesterday. BUN 60 today uptrend from 57 yesterday. Patient's oxygen is 94% on room air. Patient on azithromycin and dexamethasone for COVID and elevated WBC. Lasix stopped yesterday due to elevated creatinine. Patient denies SOB, chest pain, diarrhea, urinary pain today and feels overall better since her admission.     ROS:   Review of Systems   Constitutional:  Negative for chills, fever and weight loss.   Cardiovascular:  Negative for chest pain and palpitations.   Gastrointestinal:  Negative for diarrhea.   Genitourinary:  Negative for dysuria, frequency and urgency.   Musculoskeletal:  Negative for myalgias.   Skin:  Negative for itching and rash.   Neurological:  Negative for dizziness.        PAST MEDICAL HISTORY:   Past Medical History:   Diagnosis Date    Arthritis     knees    Cataract     jesse iol    Dental disorder     full dentures    GI bleed     in the [facility]    Heart burn     High cholesterol     Hypercholesteremia     Hypertension     pt states controlled on meds    Urinary tract infection 08/2019    currently on antibiotics for uti       PAST SURGICAL HISTORY:      Past Surgical History:   Procedure Laterality Date    ANTERIOR AND POSTERIOR REPAIR N/A 9/3/2019    Procedure: COLPORRHAPHY, COMBINED ANTEROPOSTERIOR - W/PERINEOPLASTY;  Surgeon: Jim Boykin M.D.;  Location: SURGERY SAME DAY [facility];   "Service: Gynecology    ENTEROCELE REPAIR N/A 9/3/2019    Procedure: REPAIR, ENTEROCELE;  Surgeon: Jmi Boykin M.D.;  Location: SURGERY SAME DAY [facility];  Service: Gynecology    BLADDER SLING FEMALE N/A 9/3/2019    Procedure: BLADDER SLING, FEMALE - TOT;  Surgeon: Jim Boykni M.D.;  Location: SURGERY SAME DAY [facility];  Service: Gynecology    VAGINAL SUSPENSION N/A 9/3/2019    Procedure: COLPOPEXY - SACROSPINOUS VAULT SUSPENSION;  Surgeon: Jim Boykin M.D.;  Location: SURGERY SAME DAY [facility];  Service: Gynecology    BREAST BIOPSY Left 6/14/2018    Procedure: BREAST BIOPSY - EXCISION MASS;  Surgeon: Kacie Lizama M.D.;  Location: SURGERY [facility];  Service: General    OTHER      Yony cataracts       FAMILY HISTORY:   No family history of renal disease    SOCIAL HISTORY:   - No tobacco, No EtOH, No illicits    HOME MEDICATIONS:   No current facility-administered medications on file prior to encounter.     Current Outpatient Medications on File Prior to Encounter   Medication Sig Dispense Refill    lovastatin (MEVACOR) 20 MG Tab Take 1 Tablet by mouth every day. 90 Tablet 3    metoprolol SR (TOPROL XL) 25 MG TABLET SR 24 HR Take 1 Tablet by mouth every day. 90 Tablet 3    torsemide (DEMADEX) 5 MG Tab Take 1 Tablet by mouth every day. 90 Tablet 3    losartan (COZAAR) 100 MG Tab Take 1 Tablet by mouth every day. 90 Tablet 3    apixaban (ELIQUIS) 5mg Tab Take 1 Tablet by mouth 2 times a day. 180 Tablet 1    nitrofurantoin (MACROBID) 100 MG Cap take 1 capsule by mouth twice a day 10 Capsule 0       ALLERGIES:  Penicillins and Bloodless    PHYSICAL EXAM:  VS:  /55   Pulse (!) 58   Temp 36.2 °C (97.2 °F) (Temporal)   Resp 18   Ht 1.575 m (5' 2\")   Wt (P) 67.1 kg (147 lb 14.9 oz)   SpO2 98%   BMI (P) 27.06 kg/m²   Physical Exam  Constitutional:       General: She is not in acute distress.     Appearance: She is not ill-appearing, toxic-appearing or diaphoretic.   HENT:      " Head: Normocephalic and atraumatic.      Nose: No congestion.   Cardiovascular:      Rate and Rhythm: Normal rate.      Heart sounds: No murmur heard.     No friction rub. No gallop.   Pulmonary:      Effort: No respiratory distress.      Breath sounds: No wheezing or rales.   Musculoskeletal:         General: Swelling present.   Skin:     Coloration: Skin is not jaundiced.   Neurological:      General: No focal deficit present.      Mental Status: She is alert.      Cranial Nerves: No cranial nerve deficit.      Motor: No weakness.     Trace edema bilaterally up to ankles     LABS:  Recent Results (from the past 24 hour(s))   CBC WITH DIFFERENTIAL    Collection Time: 09/09/24  1:27 AM   Result Value Ref Range    WBC 18.1 (H) 4.8 - 10.8 K/uL    RBC 4.18 (L) 4.20 - 5.40 M/uL    Hemoglobin 14.0 12.0 - 16.0 g/dL    Hematocrit 41.1 37.0 - 47.0 %    MCV 98.3 (H) 81.4 - 97.8 fL    MCH 33.5 (H) 27.0 - 33.0 pg    MCHC 34.1 32.2 - 35.5 g/dL    RDW 46.5 35.9 - 50.0 fL    Platelet Count 294 164 - 446 K/uL    MPV 10.3 9.0 - 12.9 fL    Neutrophils-Polys 74.20 (H) 44.00 - 72.00 %    Lymphocytes 18.40 (L) 22.00 - 41.00 %    Monocytes 6.20 0.00 - 13.40 %    Eosinophils 0.10 0.00 - 6.90 %    Basophils 0.10 0.00 - 1.80 %    Immature Granulocytes 1.00 (H) 0.00 - 0.90 %    Nucleated RBC 0.00 0.00 - 0.20 /100 WBC    Neutrophils (Absolute) 13.40 (H) 1.82 - 7.42 K/uL    Lymphs (Absolute) 3.32 1.00 - 4.80 K/uL    Monos (Absolute) 1.12 (H) 0.00 - 0.85 K/uL    Eos (Absolute) 0.01 0.00 - 0.51 K/uL    Baso (Absolute) 0.02 0.00 - 0.12 K/uL    Immature Granulocytes (abs) 0.18 (H) 0.00 - 0.11 K/uL    NRBC (Absolute) 0.00 K/uL   Renal Function Panel    Collection Time: 09/09/24  1:27 AM   Result Value Ref Range    Sodium 130 (L) 135 - 145 mmol/L    Potassium 4.8 3.6 - 5.5 mmol/L    Chloride 95 (L) 96 - 112 mmol/L    Co2 18 (L) 20 - 33 mmol/L    Glucose 181 (H) 65 - 99 mg/dL    Creatinine 1.50 (H) 0.50 - 1.40 mg/dL    Bun 60 (H) 8 - 22 mg/dL     Calcium 9.1 8.5 - 10.5 mg/dL    Correct Calcium 9.3 8.5 - 10.5 mg/dL    Phosphorus 4.0 2.5 - 4.5 mg/dL    Albumin 3.8 3.2 - 4.9 g/dL   ESTIMATED GFR    Collection Time: 09/09/24  1:27 AM   Result Value Ref Range    GFR (CKD-EPI) 32 (A) >60 mL/min/1.73 m 2   PROCALCITONIN    Collection Time: 09/09/24  1:27 AM   Result Value Ref Range    Procalcitonin <0.05 <0.25 ng/mL       (click the triangle to expand results)    IMAGING:  EC-ECHOCARDIOGRAM COMPLETE W/O CONT   Final Result      DX-CHEST-PORTABLE (1 VIEW)   Final Result      1.  Cardiomegaly with pulmonary edema.      2.  Small bilateral pleural effusions.          PMH/FH/SH/meds/labs/images reviewed     ASSESSMENT:  # VÍCTOR:   - Baseline Cr~.99, increased to 1.5  - Likely 2/2 Furosemide use. Started 9/5 and Dc'd 9/8, 7 doses in total given of 20 mg.   # Fluid Overload   # Hyponatremia    PLAN:  - No role for RRT at this time. Will evaluate daily  - 0222-3399 Ml fluid restriction  - Daily CMP  - No diuretic use   - Avoid nephrotoxins   - Strict I/Os    Thank you for this consult, we will continue to follow. If SNa+ continues to drop then we will limit fluid restriction to 1 L daily.    I will defer all other problems to the patient's primary team.     Discussed plan with patient, primary team, and nursing staff.  Discussed with Dr. Rizo

## 2024-09-09 NOTE — PROGRESS NOTES
"Received report and assumed care of patient (pt) at [facility] change.    Assessment completed.  Pt is A&O x 4. Pt denies any pain at this time. Pt laying in bed . Vs /65   Pulse 65   Temp 36.3 °C (97.3 °F) (Temporal)   Resp 20   Ht 1.575 m (5' 2\")   Wt 67.3 kg (148 lb 5.9 oz)   SpO2 92%  on room air.     Fall precaution in place:  Bed is in lowest, locked position, bed alarm is on, call light and belongings are within reach. Pt educated to call for assistance when getting OOB.  Plan of care discussed and all questions answered. All other needs met at this time. Care continuous.   "

## 2024-09-10 ENCOUNTER — PHARMACY VISIT (OUTPATIENT)
Dept: PHARMACY | Facility: MEDICAL CENTER | Age: 89
End: 2024-09-10
Payer: COMMERCIAL

## 2024-09-10 VITALS
OXYGEN SATURATION: 94 % | RESPIRATION RATE: 18 BRPM | HEART RATE: 67 BPM | DIASTOLIC BLOOD PRESSURE: 60 MMHG | TEMPERATURE: 97.7 F | WEIGHT: 147.93 LBS | BODY MASS INDEX: 27.22 KG/M2 | SYSTOLIC BLOOD PRESSURE: 153 MMHG | HEIGHT: 62 IN

## 2024-09-10 LAB
ALBUMIN SERPL BCP-MCNC: 3.5 G/DL (ref 3.2–4.9)
BASOPHILS # BLD AUTO: 0.2 % (ref 0–1.8)
BASOPHILS # BLD: 0.03 K/UL (ref 0–0.12)
BUN SERPL-MCNC: 65 MG/DL (ref 8–22)
CALCIUM ALBUM COR SERPL-MCNC: 9.2 MG/DL (ref 8.5–10.5)
CALCIUM SERPL-MCNC: 8.8 MG/DL (ref 8.5–10.5)
CHLORIDE SERPL-SCNC: 94 MMOL/L (ref 96–112)
CO2 SERPL-SCNC: 18 MMOL/L (ref 20–33)
CREAT SERPL-MCNC: 1.3 MG/DL (ref 0.5–1.4)
EOSINOPHIL # BLD AUTO: 0.02 K/UL (ref 0–0.51)
EOSINOPHIL NFR BLD: 0.1 % (ref 0–6.9)
ERYTHROCYTE [DISTWIDTH] IN BLOOD BY AUTOMATED COUNT: 46.5 FL (ref 35.9–50)
GFR SERPLBLD CREATININE-BSD FMLA CKD-EPI: 38 ML/MIN/1.73 M 2
GLUCOSE SERPL-MCNC: 129 MG/DL (ref 65–99)
HCT VFR BLD AUTO: 40.7 % (ref 37–47)
HGB BLD-MCNC: 13.9 G/DL (ref 12–16)
IMM GRANULOCYTES # BLD AUTO: 0.17 K/UL (ref 0–0.11)
IMM GRANULOCYTES NFR BLD AUTO: 1.1 % (ref 0–0.9)
LYMPHOCYTES # BLD AUTO: 3.52 K/UL (ref 1–4.8)
LYMPHOCYTES NFR BLD: 22.4 % (ref 22–41)
MCH RBC QN AUTO: 33.7 PG (ref 27–33)
MCHC RBC AUTO-ENTMCNC: 34.2 G/DL (ref 32.2–35.5)
MCV RBC AUTO: 98.8 FL (ref 81.4–97.8)
MONOCYTES # BLD AUTO: 1.56 K/UL (ref 0–0.85)
MONOCYTES NFR BLD AUTO: 9.9 % (ref 0–13.4)
NEUTROPHILS # BLD AUTO: 10.38 K/UL (ref 1.82–7.42)
NEUTROPHILS NFR BLD: 66.3 % (ref 44–72)
NRBC # BLD AUTO: 0 K/UL
NRBC BLD-RTO: 0 /100 WBC (ref 0–0.2)
PHOSPHATE SERPL-MCNC: 4.6 MG/DL (ref 2.5–4.5)
PLATELET # BLD AUTO: 299 K/UL (ref 164–446)
PMV BLD AUTO: 10.2 FL (ref 9–12.9)
POTASSIUM SERPL-SCNC: 4.4 MMOL/L (ref 3.6–5.5)
RBC # BLD AUTO: 4.12 M/UL (ref 4.2–5.4)
SODIUM SERPL-SCNC: 128 MMOL/L (ref 135–145)
WBC # BLD AUTO: 15.7 K/UL (ref 4.8–10.8)

## 2024-09-10 PROCEDURE — A9270 NON-COVERED ITEM OR SERVICE: HCPCS | Performed by: HOSPITALIST

## 2024-09-10 PROCEDURE — RXMED WILLOW AMBULATORY MEDICATION CHARGE: Performed by: STUDENT IN AN ORGANIZED HEALTH CARE EDUCATION/TRAINING PROGRAM

## 2024-09-10 PROCEDURE — 99239 HOSP IP/OBS DSCHRG MGMT >30: CPT | Performed by: STUDENT IN AN ORGANIZED HEALTH CARE EDUCATION/TRAINING PROGRAM

## 2024-09-10 PROCEDURE — 700102 HCHG RX REV CODE 250 W/ 637 OVERRIDE(OP): Performed by: INTERNAL MEDICINE

## 2024-09-10 PROCEDURE — 700102 HCHG RX REV CODE 250 W/ 637 OVERRIDE(OP): Performed by: HOSPITALIST

## 2024-09-10 PROCEDURE — A9270 NON-COVERED ITEM OR SERVICE: HCPCS | Performed by: INTERNAL MEDICINE

## 2024-09-10 PROCEDURE — 85025 COMPLETE CBC W/AUTO DIFF WBC: CPT

## 2024-09-10 PROCEDURE — 36415 COLL VENOUS BLD VENIPUNCTURE: CPT

## 2024-09-10 PROCEDURE — 80069 RENAL FUNCTION PANEL: CPT

## 2024-09-10 RX ORDER — METOPROLOL TARTRATE 25 MG/1
12.5 TABLET, FILM COATED ORAL 2 TIMES DAILY
Qty: 60 TABLET | Refills: 0 | Status: SHIPPED | OUTPATIENT
Start: 2024-09-10 | End: 2024-09-19

## 2024-09-10 RX ORDER — DEXAMETHASONE 6 MG/1
6 TABLET ORAL DAILY
Qty: 3 TABLET | Refills: 0 | Status: SHIPPED | OUTPATIENT
Start: 2024-09-11 | End: 2024-09-14

## 2024-09-10 RX ORDER — AMLODIPINE BESYLATE 5 MG/1
5 TABLET ORAL DAILY
Qty: 30 TABLET | Refills: 0 | Status: SHIPPED | OUTPATIENT
Start: 2024-09-11

## 2024-09-10 RX ORDER — NITROFURANTOIN 25; 75 MG/1; MG/1
100 CAPSULE ORAL 2 TIMES DAILY
Qty: 6 CAPSULE | Refills: 0 | Status: SHIPPED | OUTPATIENT
Start: 2024-09-10 | End: 2024-09-10

## 2024-09-10 RX ORDER — ACETAMINOPHEN 325 MG/1
650 TABLET ORAL EVERY 6 HOURS PRN
Qty: 30 TABLET | Refills: 0 | Status: SHIPPED | OUTPATIENT
Start: 2024-09-10

## 2024-09-10 RX ORDER — GUAIFENESIN 600 MG/1
600 TABLET, EXTENDED RELEASE ORAL 2 TIMES DAILY PRN
Qty: 28 TABLET | Refills: 0 | Status: SHIPPED | OUTPATIENT
Start: 2024-09-10

## 2024-09-10 RX ORDER — CEFPODOXIME PROXETIL 200 MG/1
100 TABLET, FILM COATED ORAL DAILY
Qty: 2 TABLET | Refills: 0 | Status: ACTIVE | OUTPATIENT
Start: 2024-09-10 | End: 2024-09-13

## 2024-09-10 RX ADMIN — AMLODIPINE BESYLATE 5 MG: 5 TABLET ORAL at 04:35

## 2024-09-10 RX ADMIN — LOVASTATIN 20 MG: 20 TABLET ORAL at 04:35

## 2024-09-10 RX ADMIN — APIXABAN 2.5 MG: 2.5 TABLET, FILM COATED ORAL at 04:35

## 2024-09-10 RX ADMIN — AZITHROMYCIN DIHYDRATE 500 MG: 250 TABLET ORAL at 04:35

## 2024-09-10 RX ADMIN — DEXAMETHASONE 6 MG: 4 TABLET ORAL at 04:35

## 2024-09-10 NOTE — DISCHARGE INSTRUCTIONS
COVID-19  COVID-19, or coronavirus disease 2019, is an infection that is caused by a new (novel) coronavirus called SARS-CoV-2. COVID-19 can cause many symptoms. In some people, the virus may not cause any symptoms. In others, it may cause mild or severe symptoms. Some people with severe infection develop severe disease.  What are the causes?  This illness is caused by a virus. The virus may be in the air as tiny specks of fluid (aerosols) or droplets, or it may be on surfaces. You may catch the virus by:  Breathing in droplets from an infected person. Droplets can be spread by a person breathing, speaking, singing, coughing, or sneezing.  Touching something, like a table or a doorknob, that has virus on it (is contaminated) and then touching your mouth, nose, or eyes.  What increases the risk?  Risk for infection:  You are more likely to get infected with the COVID-19 virus if:  You are within 6 ft (1.8 m) of a person with COVID-19 for 15 minutes or longer.  You are providing care for a person who is infected with COVID-19.  You are in close personal contact with other people. Close personal contact includes hugging, kissing, or sharing eating or drinking utensils.  Risk for serious illness caused by COVID-19:  You are more likely to get seriously ill from the COVID-19 virus if:  You have cancer.  You have a long-term (chronic) disease, such as:  Chronic lung disease. This includes pulmonary embolism, chronic obstructive pulmonary disease, and cystic fibrosis.  Long-term disease that lowers your body's ability to fight infection (immunocompromise).  Serious cardiac conditions, such as heart failure, coronary artery disease, or cardiomyopathy.  Diabetes.  Chronic kidney disease.  Liver diseases. These include cirrhosis, nonalcoholic fatty liver disease, alcoholic liver disease, or autoimmune hepatitis.  You have obesity.  You are pregnant or were recently pregnant.  You have sickle cell disease.  What are the signs  or symptoms?  Symptoms of this condition can range from mild to severe. Symptoms may appear any time from 2 to 14 days after being exposed to the virus. They include:  Fever or chills.  Shortness of breath or trouble breathing.  Feeling tired or very tired.  Headaches, body aches, or muscle aches.  Runny or stuffy nose, sneezing, coughing, or sore throat.  New loss of taste or smell. This is rare.  Some people may also have stomach problems, such as nausea, vomiting, or diarrhea.  Other people may not have any symptoms of COVID-19.  How is this diagnosed?  This condition may be diagnosed by testing samples to check for the COVID-19 virus. The most common tests are the PCR test and the antigen test. Tests may be done in the lab or at home. They include:  Using a swab to take a sample of fluid from the back of your nose and throat (nasopharyngeal fluid), from your nose, or from your throat.  Testing a sample of saliva from your mouth.  Testing a sample of coughed-up mucus from your lungs (sputum).  How is this treated?  Treatment for COVID-19 infection depends on the severity of the condition.  Mild symptoms can be managed at home with rest, fluids, and over-the-counter medicines.  Serious symptoms may be treated in a hospital intensive care unit (ICU). Treatment in the ICU may include:  Supplemental oxygen. Extra oxygen is given through a tube in the nose, a face mask, or a harvey.  Medicines. These may include:  Antivirals, such as monoclonal antibodies. These help your body fight off certain viruses that can cause disease.  Anti-inflammatories, such as corticosteroids. These reduce inflammation and suppress the immune system.  Antithrombotics. These prevent or treat blood clots, if they develop.  Convalescent plasma. This helps boost your immune system, if you have an underlying immunosuppressive condition or are getting immunosuppressive treatments.  Prone positioning. This means you will lie on your stomach. This  helps oxygen to get into your lungs.  Infection control measures.  If you are at risk for more serious illness caused by COVID-19, your health care provider may prescribe two long-acting monoclonal antibodies, given together every 6 months.  How is this prevented?  To protect yourself:  Use preventive medicine (pre-exposure prophylaxis). You may get pre-exposure prophylaxis if you have moderate or severe immunocompromise.  Get vaccinated. Anyone 6 months old or older who meets guidelines can get a COVID-19 vaccine or vaccine series. This includes people who are pregnant or making breast milk (lactating).  Get an added dose of COVID-19 vaccine after your first vaccine or vaccine series if you have moderate to severe immunocompromise. This applies if you have had a solid organ transplant or have been diagnosed with an immunocompromising condition.  You should get the added dose 4 weeks after you got the first COVID-19 vaccine or vaccine series.  If you get an mRNA vaccine, you will need a 3-dose primary series.  If you get the J&J/Rebeca vaccine, you will need a 2-dose primary series, with the second dose being an mRNA vaccine.  Talk to your health care provider about getting experimental monoclonal antibodies. This treatment is approved under emergency use authorization to prevent severe illness before or after being exposed to the COVID-19 virus. You may be given monoclonal antibodies if:  You have moderate or severe immunocompromise. This includes treatments that lower your immune response. People with immunocompromise may not develop protection against COVID-19 when they are vaccinated.  You cannot be vaccinated. You may not get a vaccine if you have a severe allergic reaction to the vaccine or its components.  You are not fully vaccinated.  You are in a facility where COVID-19 is present and:  Are in close contact with a person who is infected with the COVID-19 virus.  Are at high risk of being exposed to the  COVID-19 virus.  You are at risk of illness from new variants of the COVID-19 virus.  To protect others:  If you have symptoms of COVID-19, take steps to prevent the virus from spreading to others.  Stay home. Leave your house only to get medical care. Do not use public transit, if possible.  Do not travel while you are sick.  Wash your hands often with soap and water for at least 20 seconds. If soap and water are not available, use alcohol-based hand .  Make sure that all people in your household wash their hands well and often.  Cough or sneeze into a tissue or your sleeve or elbow. Do not cough or sneeze into your hand or into the air.  Where to find more information  Centers for Disease Control and Prevention: www.cdc.gov/coronavirus  World Health Organization: www.who.int/health-topics/coronavirus  Get help right away if:  You have trouble breathing.  You have pain or pressure in your chest.  You are confused.  You have bluish lips and fingernails.  You have trouble waking from sleep.  You have symptoms that get worse.  These symptoms may be an emergency. Get help right away. Call 911.  Do not wait to see if the symptoms will go away.  Do not drive yourself to the hospital.  Summary  COVID-19 is an infection that is caused by a new coronavirus.  Sometimes, there are no symptoms. Other times, symptoms range from mild to severe. Some people with a severe COVID-19 infection develop severe disease.  The virus that causes COVID-19 can spread from person to person through droplets or aerosols from breathing, speaking, singing, coughing, or sneezing.  Mild symptoms of COVID-19 can be managed at home with rest, fluids, and over-the-counter medicines.  This information is not intended to replace advice given to you by your health care provider. Make sure you discuss any questions you have with your health care provider.  Document Revised: 12/08/2022 Document Reviewed: 12/08/2022  [employer] Patient Education ©  2023 [employer]    Urinary Tract Infection, Adult  A urinary tract infection (UTI) is an infection of any part of the urinary tract. The urinary tract includes:  The kidneys.  The ureters.  The bladder.  The urethra.  These organs make, store, and get rid of pee (urine) in the body.  What are the causes?  This infection is caused by germs (bacteria) in your genital area. These germs grow and cause swelling (inflammation) of your urinary tract.  What increases the risk?  The following factors may make you more likely to develop this condition:  Using a small, thin tube (catheter) to drain pee.  Not being able to control when you pee or poop (incontinence).  Being female. If you are female, these things can increase the risk:  Using these methods to prevent pregnancy:  A medicine that kills sperm (spermicide).  A device that blocks sperm (diaphragm).  Having low levels of a female hormone (estrogen).  Being pregnant.  You are more likely to develop this condition if:  You have genes that add to your risk.  You are sexually active.  You take antibiotic medicines.  You have trouble peeing because of:  A prostate that is bigger than normal, if you are male.  A blockage in the part of your body that drains pee from the bladder.  A kidney stone.  A nerve condition that affects your bladder.  Not getting enough to drink.  Not peeing often enough.  You have other conditions, such as:  Diabetes.  A weak disease-fighting system (immune system).  Sickle cell disease.  Gout.  Injury of the spine.  What are the signs or symptoms?  Symptoms of this condition include:  Needing to pee right away.  Peeing small amounts often.  Pain or burning when peeing.  Blood in the pee.  Pee that smells bad or not like normal.  Trouble peeing.  Pee that is cloudy.  Fluid coming from the vagina, if you are female.  Pain in the belly or lower back.  Other symptoms include:  Vomiting.  Not feeling hungry.  Feeling mixed up (confused). This may be  the first symptom in older adults.  Being tired and grouchy (irritable).  A fever.  Watery poop (diarrhea).  How is this treated?  Taking antibiotic medicine.  Taking other medicines.  Drinking enough water.  In some cases, you may need to see a specialist.  Follow these instructions at home:    Medicines  Take over-the-counter and prescription medicines only as told by your doctor.  If you were prescribed an antibiotic medicine, take it as told by your doctor. Do not stop taking it even if you start to feel better.  General instructions  Make sure you:  Pee until your bladder is empty.  Do not hold pee for a long time.  Empty your bladder after sex.  Wipe from front to back after peeing or pooping if you are a female. Use each tissue one time when you wipe.  Drink enough fluid to keep your pee pale yellow.  Keep all follow-up visits.  Contact a doctor if:  You do not get better after 1-2 days.  Your symptoms go away and then come back.  Get help right away if:  You have very bad back pain.  You have very bad pain in your lower belly.  You have a fever.  You have chills.  You feeling like you will vomit or you vomit.  Summary  A urinary tract infection (UTI) is an infection of any part of the urinary tract.  This condition is caused by germs in your genital area.  There are many risk factors for a UTI.  Treatment includes antibiotic medicines.  Drink enough fluid to keep your pee pale yellow.  This information is not intended to replace advice given to you by your health care provider. Make sure you discuss any questions you have with your health care provider.  Document Revised: 07/30/2021 Document Reviewed: 07/30/2021  Elsevier Patient Education © 2023 [employer]

## 2024-09-10 NOTE — THERAPY
Physical Therapy   Initial Evaluation     Patient Name: Veneracion Reyes Villagracia  Age:  89 y.o., Sex:  female  Medical Record #: [id number]  Today's Date: 9/9/2024     Precautions  Precautions: Fall Risk    Assessment  Patient is 89 y.o. female presenting with SOB . Pt found to have acute respiratory failure d/t Covid-19. Chest x-ray showed pulmonary edema with small bilateral pleural effusions. Pt with PMH including HFpEF, A-fib, pulmonary hypertension, DLD, HTN, and recurrent UTIs. Pt is independent with functional mobility at baseline using 4WW. Lives in 1SH with good family support from dtr and grandson.     During current session, pt presents near functional baseline requiring overall SBA for mobility as detailed below. Able to walk 150 ft and 100 ft with FWW, no LOB. Distance limited by SOB although anticipate that this will improve given time and frequent opportunities to mobilize OOB. Educated pt and dtrs about the importance of OOB mobility in setting of Covid-19 as below. Encouraged pt to continue ambulating to the toilet and in the hallway with nursing as tolerated. Recommend d/c home with charo FWW and HHPT. Pt and family deny any mobility concerns with d/c home. Patient will not be actively followed for physical therapy services at this time, however may be seen if requested by physician for 1 more visit within 30 days to address any discharge or equipment needs.    Plan    Physical Therapy Initial Treatment Plan   Duration: Discharge Needs Only    DC Equipment Recommendations: Front-Wheel Walker (ordered charo FWW)  Discharge Recommendations: Recommend home health for continued physical therapy services       Subjective    Pt received resting in bed, agreeable to participate.      Objective       09/09/24 [facility]    Services   Is patient using  services for this encounter? Yes   Language Interpreted Bermudian/Tagalog    Name 2 dtrs at bedside    Mode Other    Other  Mode 2 dtrs at bedside interpreted intermittently as needed   Refusal signed by patient? No   Content of Interpretation (select all) Other  (PT eval)   Initial Contact Note    Initial Contact Note Order Received and Verified, Evaluation Only - Patient Does Not Require Further Acute Physical Therapy at this Time.  However, May Benefit from Post Acute Therapy for Higher Level Functional Deficits.   Precautions   Precautions Fall Risk   Vitals   Pulse Oximetry 95 %   O2 Delivery Device None - Room Air   Pain 0 - 10 Group   Therapist Pain Assessment Post Activity Pain Same as Prior to Activity;Nurse Notified  (no c/o pain)   Prior Living Situation   Prior Services Intermittent Physical Support for ADL Per Family   Housing / Facility 1 Story House   Steps Into Home 1   Equipment Owned 4-Wheel Walker;Wheelchair   Lives with - Patient's Self Care Capacity Adult Children   Comments Lives in [place] with dtr and grandson. Grandson is home while dtr works. Family performs IADLs at baseline. Another local dtr works as an ICU nurse at the [employer]   Prior Level of Functional Mobility   Bed Mobility Independent   Transfer Status Independent   Ambulation Independent   Ambulation Distance limited community   Assistive Devices Used 4-Wheel Walker   Wheelchair Dependent   Stairs Independent   Comments dtr endorses 4WW use at home and 4WW vs wc use in the community   History of Falls   History of Falls Yes   Date of Last Fall   (pt and family report last fall in 2018)   Cognition    Cognition / Consciousness X   Speech/ Communication Hard of Hearing   Level of Consciousness Alert   Comments pleasant and cooperative   Passive ROM Lower Body   Passive ROM Lower Body WDL   Active ROM Lower Body    Active ROM Lower Body  WDL   Strength Lower Body   Lower Body Strength  X   Gross Strength Generalized Weakness, Equal Bilaterally   Comments functional for household ambulation   Sensation Lower Body   Comments no c/o altered  sensation BLE   Coordination Lower Body    Coordination Lower Body  WDL   Comments within age norms   Balance Assessment   Sitting Balance (Static) Fair +   Sitting Balance (Dynamic) Fair   Standing Balance (Static) Fair   Standing Balance (Dynamic) Fair   Weight Shift Sitting Fair   Weight Shift Standing Fair   Comments FWW   Bed Mobility    Supine to Sit Supervised   Scooting Supervised   Comments flat bed, up in chair post   Gait Analysis   Gait Level Of Assist Standby Assist   Assistive Device Front Wheel Walker   Distance (Feet) 150   # of Times Distance was Traveled 1  (plus 100 ft)   Deviation Bradykinetic;Shuffled Gait   # of Stairs Climbed 0   Comments distance limited by SOB   Functional Mobility   Sit to Stand Supervised   Bed, Chair, Wheelchair Transfer Standby Assist   Toilet Transfers Standby Assist   Transfer Method Stand Step   Mobility bed>up in hallway FWW>toilet>chair   Comments required cues for hand placement during txfers   6 Clicks Assessment - How much HELP from from another person do you currently need... (If the patient hasn't done an activity recently, how much help from another person do you think he/she would need if he/she tried?)   Turning from your back to your side while in a flat bed without using bedrails? 3   Moving from lying on your back to sitting on the side of a flat bed without using bedrails? 3   Moving to and from a bed to a chair (including a wheelchair)? 3   Standing up from a chair using your arms (e.g., wheelchair, or bedside chair)? 3   Walking in hospital room? 3   Climbing 3-5 steps with a railing? 3   6 clicks Mobility Score 18   Education Group   Education Provided Role of Physical Therapist   Role of Physical Therapist Patient Response Patient;Family;Acceptance;Explanation;Demonstration;;Verbal Demonstration;Action Demonstration   Additional Comments educated pt and 2 dtrs about importance of frequent OOB mobility including sitting in chair 3x/day for  meals, especially in setting of SOB 2/2 covid-19. Pt and dtrs agreeable   Physical Therapy Initial Treatment Plan    Duration Discharge Needs Only   Problem List    Problems None   Anticipated Discharge Equipment and Recommendations   DC Equipment Recommendations Front-Wheel Walker  (ordered charo FWW)   Discharge Recommendations Recommend home health for continued physical therapy services   Interdisciplinary Plan of Care Collaboration   IDT Collaboration with  Nursing;Family / Caregiver   Patient Position at End of Therapy Seated;Call Light within Reach;Tray Table within Reach;Phone within Reach;Family / Friend in Room   Collaboration Comments RN updated, 2 dtrs present and supportive   Session Information   Date / Session Number  9/9- d/c needs only

## 2024-09-10 NOTE — DISCHARGE PLANNING
Case Management Discharge Planning    Admission Date: 9/5/2024  GMLOS: 5  ALOS: 5    6-Clicks ADL Score: 22  6-Clicks Mobility Score: 18      Anticipated Discharge Dispo: Discharge Disposition: Discharged to home/self care (01)  Discharge Address: [address] [place] [address] [address] [place] [address]    DME Needed: Yes    DME Ordered: Yes    Action(s) Taken: Choice obtained and Referral(s) sent    RN CM met with patient and daughter at bedside to obtain DME Walker choice. Patient's daughter Ashli stated they do not want Home Health; choice for [facility] obtained and faxed to [facility].    Escalations Completed: None    Medically Clear: Yes    Next Steps: [facility] to deliver walker to bedside. CM will follow and assist with DCP needs as they arise.    Barriers to Discharge: None    Is the patient up for discharge tomorrow: No

## 2024-09-10 NOTE — PROGRESS NOTES
Pt. being discharged. Pt. educated on discharge instructions and new prescriptions. Pt verbalizes understanding. Follow up appointment made with PCP. PIV removed, monitor checked in. Pt. Going home with family.

## 2024-09-10 NOTE — CARE PLAN
The patient is Stable - Low risk of patient condition declining or worsening    Shift Goals  Clinical Goals: vss; monitor labs  Patient Goals: sleep  Family Goals: mabel    Progress made toward(s) clinical / shift goals:    Problem: Knowledge Deficit - Standard  Goal: Patient and family/care givers will demonstrate understanding of plan of care, disease process/condition, diagnostic tests and medications  Outcome: Progressing     Problem: Fall Risk  Goal: Patient will remain free from falls  Outcome: Progressing       Patient is not progressing towards the following goals:

## 2024-09-10 NOTE — DOCUMENTATION QUERY
"                                                                         [facility]                                                                       Query Response Note      PATIENT:               RADHA FIORE  ACCT #:                  [id number]  MRN:                     [id number]  :                      [id number]  ADMIT DATE:       2024 11:41 AM  DISCH DATE:        9/10/2024 11:01 AM  RESPONDING  PROVIDER #:        [id number]           QUERY TEXT:    Atrial fibrillation is documented in the Medical Record.      Please specify the type.    The patient's Clinical Indicators include:  89yo with dx of HTN, diastolic heart failure, VÍCTOR, hyponatremia, CKD3a     H&P \"...atrial fibrillation rate control. Continue apixaban...\"    Risk factors: atrial fibrillation, VÍCTOR, diastolic heart failure, hyponatremia, CKD3a, HTN  Treatments: medication, EKG, labwork, monitoring    If you agree with the above dx, please document in the medical record.    Contact me with questions.     Thank you,  TAYLOR Persaud, CDI  [email or web address]  Options provided:   -- Paroxysmal atrial fibrillation (self-terminating or intermittent spontaneously or with intervention within 7 days of onset)   -- Permanent atrial fibrillation (persistent or longstanding persistent AF where cardioversion cannot or will not be performed   -- Other persistent atrial fibrillation (AF that does not terminate within 7 days or that requires repeat pharmacological or electrical cardioversion.   -- Longstanding persistent atrial fibrillation (AF that is persistent and continuous, lasting longer than 1 year)   -- Chronic atrial fibrillation, unspecified (may refer to persistent, longstanding persistent or permanent AF.  A more specific descriptive term is preferred over the nonspecific AF   -- Atrial fibrillation unspecified   -- Other explanation, (please specify other explanation)      Query created by: Nataliia Gonzalez on 9/10/2024 10:08 " AM    RESPONSE TEXT:    Atrial fibrillation unspecified          Electronically signed by:  KIKI ALONSO MD 9/10/2024 12:18 PM

## 2024-09-10 NOTE — FACE TO FACE
Face to Face Note  -  Durable Medical Equipment    Paras Candelaria M.D. - NPI: [id number]  I certify that this patient is under my care and that they had a durable medical equipment(DME)face to face encounter by myself that meets the physician DME face-to-face encounter requirements with this patient on:    Date of encounter:   Patient:                    MRN:                       YOB: 2024  Veneracion Reyes Villagracia  [id number]  4/17/1932     The encounter with the patient was in whole, or in part, for the following medical condition, which is the primary reason for durable medical equipment:  Covid-19 Infection    I certify that, based on my findings, the following durable medical equipment is medically necessary:    Walkers.    My Clinical findings support the need for the above equipment due to:  Abnormal Gait

## 2024-09-10 NOTE — PROGRESS NOTES
Monitor Summary  Rhythm: Afib  Rate: 48-56  Ectopy: PVC  Measurements: --/0.08/--  ---12 hr Chart Review---   Per monitor room interpretation

## 2024-09-10 NOTE — DISCHARGE SUMMARY
Discharge Summary    CHIEF COMPLAINT ON ADMISSION  Chief Complaint   Patient presents with    Painful Urination       Reason for Admission  Painful urination     Admission Date  9/5/2024    CODE STATUS  Full Code    HPI & HOSPITAL COURSE  3 yo woman HFpEF, A-fib, pulmonary hypertension, dyslipidemia, hypertension, and recurrent UTIs and recently treated for UTI who presented with lethargy and found to be hypoxic and COVID-positive. Chest x-ray showed pulmonary edema with small bilateral pleural effusions. TTE showed mild LVH, normal EF, mild TR. she was started on a Decadron steroid regimen which she is to complete in the outpatient setting.  She was ultimately titrated off oxygen and back to nocturnal O2 use.  Throughout hospital stay, patient noted for bradycardia which was attributed to home metoprolol for which holding parameters were initiated.  Patient reporting dysuria and urgency for which she was discharged on a cefpodoxime antibiotic regimen which she is to complete in the outpatient setting.  She was eval by physical therapy and Occupational Therapy who recommended continuation of therapy with home health and patient was supplied a walker.  Stable patient within chronic condition was discharged home and instructed to follow-up with her primary care provider in the outpatient setting.    All results and plan of action discussed with patient and daughters at bedside reports they voiced understanding and agreement with the primary care team.  Patient and daughter were instructed to return to emergency department if symptoms were to worsen.    Therefore, she is discharged in good and stable condition to home with organized home healthcare and close outpatient follow-up.    The patient met 2-midnight criteria for an inpatient stay at the time of discharge.    Discharge Date  9/10/2024    FOLLOW UP ITEMS POST DISCHARGE  Primary care provider follow posthospital discharge care    DISCHARGE DIAGNOSES  Principal  Problem:    Acute respiratory failure due to COVID-19 (HCC) (POA: Yes)  Active Problems:    Dyslipidemia (POA: Yes)    Hearing loss (POA: Yes)    AF (atrial fibrillation) (HCC) (POA: Yes)    Chronic diastolic heart failure (HCC) (POA: Yes)    Chronic kidney disease, stage 3a (POA: Yes)    Overweight (BMI 25.0-29.9) (POA: Unknown)    COVID-19 (POA: Unknown)    Macrocytic anemia (POA: Unknown)  Resolved Problems:    * No resolved hospital problems. *      FOLLOW UP  Future Appointments   Date Time Provider Department Center   9/12/2024 12:20 PM [facility]   9/19/2024 12:45 PM HARINI Duran None     No follow-up provider specified.    MEDICATIONS ON DISCHARGE     Medication List        START taking these medications        Instructions   acetaminophen 325 MG Tabs  Commonly known as: Tylenol   Take 2 Tablets by mouth every 6 hours as needed for Moderate Pain.  Dose: 650 mg     amLODIPine 5 MG Tabs  Start taking on: September 11, 2024  Commonly known as: Norvasc   Take 1 Tablet by mouth every day.  Dose: 5 mg     dexamethasone 6 MG Tabs  Start taking on: September 11, 2024  Commonly known as: Decadron   Take 1 Tablet by mouth every day for 3 days.  Dose: 6 mg     guaiFENesin  MG Tb12  Commonly known as: Mucinex   Take 1 Tablet by mouth 2 times a day as needed for Cough (productive cough).  Dose: 600 mg     metoprolol tartrate 25 MG Tabs  Commonly known as: Lopressor   Doctor's comments: Hold for SBP <100 or HR <60  Take 0.5 Tablets by mouth 2 times a day.  Dose: 12.5 mg            CONTINUE taking these medications        Instructions   apixaban 5mg Tabs  Commonly known as: Eliquis   Doctor's comments: Patient going on extended vacation, needs early refill  Take 1 Tablet by mouth 2 times a day.  Dose: 5 mg     losartan 100 MG Tabs  Commonly known as: Cozaar   Doctor's comments: Patient going on extended vacation, needs early refill  Take 1 Tablet by mouth every day.  Dose:  100 mg     lovastatin 20 MG Tabs  Commonly known as: Mevacor   Doctor's comments: Patient going on extended vacation, needs early refill  Take 1 Tablet by mouth every day.  Dose: 20 mg            STOP taking these medications      metoprolol SR 25 MG Tb24  Commonly known as: Toprol XL     nitrofurantoin 100 MG Caps  Commonly known as: Macrobid     torsemide 5 MG Tabs  Commonly known as: Demadex              Allergies  Allergies   Allergen Reactions    Penicillins Shortness of Breath and Rash     Difficulty breathing rash    Bloodless        DIET  Orders Placed This Encounter   Procedures    Diet Order Diet: Cardiac; Fluid modifications: (optional): 1500 ml Fluid Restriction     Standing Status:   Standing     Number of Occurrences:   1     Order Specific Question:   Diet:     Answer:   Cardiac [6]     Order Specific Question:   Fluid modifications: (optional)     Answer:   1500 ml Fluid Restriction [9]       ACTIVITY  As tolerated.  Weight bearing as tolerated    CONSULTATIONS  Nephrology    PROCEDURES  None    LABORATORY  Lab Results   Component Value Date    SODIUM 128 (L) 09/10/2024    POTASSIUM 4.4 09/10/2024    CHLORIDE 94 (L) 09/10/2024    CO2 18 (L) 09/10/2024    GLUCOSE 129 (H) 09/10/2024    BUN 65 (H) 09/10/2024    CREATININE 1.30 09/10/2024        Lab Results   Component Value Date    WBC 15.7 (H) 09/10/2024    HEMOGLOBIN 13.9 09/10/2024    HEMATOCRIT 40.7 09/10/2024    PLATELETCT 299 09/10/2024        Total time of the discharge process exceeds 36 minutes.

## 2024-09-10 NOTE — DISCHARGE PLANNING
Received Choice Form @: 4368  Agency/Facility Name: [facility]  Referral Sent Per Choice Form @: 1216

## 2024-09-10 NOTE — CARE PLAN
Problem: Knowledge Deficit - Standard  Goal: Patient and family/care givers will demonstrate understanding of plan of care, disease process/condition, diagnostic tests and medications  Description: Target End Date:  1-3 days or as soon as patient condition allows    Document in Patient Education    1.  Patient and family/caregiver oriented to unit, equipment, visitation policy and means for communicating concern  2.  Complete/review Learning Assessment  3.  Assess knowledge level of disease process/condition, treatment plan, diagnostic tests and medications  4.  Explain disease process/condition, treatment plan, diagnostic tests and medications  Outcome: Progressing     Problem: Fall Risk  Goal: Patient will remain free from falls  Description: Target End Date:  Prior to discharge or change in level of care    Document interventions on the [place] Fall Risk Assessment    1.  Assess for fall risk factors  2.  Implement fall precautions  Outcome: Progressing   The patient is Stable - Low risk of patient condition declining or worsening    Shift Goals  Clinical Goals: monitor crt  Patient Goals: rest  Family Goals: Updates    Progress made toward(s) clinical / shift goals:      Patient is not progressing towards the following goals:

## 2024-09-11 ENCOUNTER — PATIENT OUTREACH (OUTPATIENT)
Dept: MEDICAL GROUP | Facility: LAB | Age: 89
End: 2024-09-11
Payer: MEDICARE

## 2024-09-11 NOTE — PROGRESS NOTES
9/11: Pt has an appointment scheduled with PCP within two business days of discharge on 9/12/24 at 1220 for TCM. Therefore, a call by the RN is not required and PCP may bill for TCM appt.

## 2024-09-16 ENCOUNTER — HOSPITAL ENCOUNTER (OUTPATIENT)
Dept: LAB | Facility: MEDICAL CENTER | Age: 89
End: 2024-09-16
Attending: NURSE PRACTITIONER
Payer: MEDICARE

## 2024-09-16 LAB
ALBUMIN SERPL BCP-MCNC: 3.5 G/DL (ref 3.2–4.9)
ALBUMIN/GLOB SERPL: 1.3 G/DL
ALP SERPL-CCNC: 85 U/L (ref 30–99)
ALT SERPL-CCNC: 16 U/L (ref 2–50)
ANION GAP SERPL CALC-SCNC: 14 MMOL/L (ref 7–16)
AST SERPL-CCNC: 19 U/L (ref 12–45)
BILIRUB SERPL-MCNC: 0.5 MG/DL (ref 0.1–1.5)
BUN SERPL-MCNC: 43 MG/DL (ref 8–22)
CALCIUM ALBUM COR SERPL-MCNC: 8.7 MG/DL (ref 8.5–10.5)
CALCIUM SERPL-MCNC: 8.3 MG/DL (ref 8.5–10.5)
CHLORIDE SERPL-SCNC: 98 MMOL/L (ref 96–112)
CO2 SERPL-SCNC: 13 MMOL/L (ref 20–33)
CREAT SERPL-MCNC: 0.97 MG/DL (ref 0.5–1.4)
GFR SERPLBLD CREATININE-BSD FMLA CKD-EPI: 55 ML/MIN/1.73 M 2
GLOBULIN SER CALC-MCNC: 2.7 G/DL (ref 1.9–3.5)
GLUCOSE SERPL-MCNC: 117 MG/DL (ref 65–99)
POTASSIUM SERPL-SCNC: 5.2 MMOL/L (ref 3.6–5.5)
PROT SERPL-MCNC: 6.2 G/DL (ref 6–8.2)
SODIUM SERPL-SCNC: 125 MMOL/L (ref 135–145)

## 2024-09-16 PROCEDURE — 36415 COLL VENOUS BLD VENIPUNCTURE: CPT

## 2024-09-16 PROCEDURE — 80053 COMPREHEN METABOLIC PANEL: CPT

## 2024-09-19 ENCOUNTER — OFFICE VISIT (OUTPATIENT)
Dept: CARDIOLOGY | Facility: MEDICAL CENTER | Age: 89
End: 2024-09-19
Attending: INTERNAL MEDICINE
Payer: MEDICARE

## 2024-09-19 ENCOUNTER — APPOINTMENT (OUTPATIENT)
Dept: CARDIOLOGY | Facility: MEDICAL CENTER | Age: 89
End: 2024-09-19
Attending: STUDENT IN AN ORGANIZED HEALTH CARE EDUCATION/TRAINING PROGRAM
Payer: MEDICARE

## 2024-09-19 VITALS
WEIGHT: 151 LBS | DIASTOLIC BLOOD PRESSURE: 70 MMHG | OXYGEN SATURATION: 100 % | BODY MASS INDEX: 27.79 KG/M2 | RESPIRATION RATE: 16 BRPM | HEART RATE: 51 BPM | SYSTOLIC BLOOD PRESSURE: 120 MMHG | HEIGHT: 62 IN

## 2024-09-19 DIAGNOSIS — I50.9 HEART FAILURE, NYHA CLASS 2 (HCC): ICD-10-CM

## 2024-09-19 DIAGNOSIS — N18.31 CHRONIC KIDNEY DISEASE, STAGE 3A: ICD-10-CM

## 2024-09-19 DIAGNOSIS — N18.31 STAGE 3A CHRONIC KIDNEY DISEASE: ICD-10-CM

## 2024-09-19 DIAGNOSIS — R06.09 DYSPNEA ON EXERTION: ICD-10-CM

## 2024-09-19 DIAGNOSIS — I48.0 PAROXYSMAL ATRIAL FIBRILLATION (HCC): ICD-10-CM

## 2024-09-19 DIAGNOSIS — Z79.899 HIGH RISK MEDICATION USE: ICD-10-CM

## 2024-09-19 DIAGNOSIS — I50.30 ACC/AHA STAGE C HEART FAILURE WITH PRESERVED EJECTION FRACTION (HCC): ICD-10-CM

## 2024-09-19 DIAGNOSIS — D68.69 SECONDARY HYPERCOAGULABLE STATE (HCC): ICD-10-CM

## 2024-09-19 DIAGNOSIS — I10 HTN (HYPERTENSION), MALIGNANT: ICD-10-CM

## 2024-09-19 LAB — EKG IMPRESSION: NORMAL

## 2024-09-19 PROCEDURE — G2211 COMPLEX E/M VISIT ADD ON: HCPCS | Performed by: INTERNAL MEDICINE

## 2024-09-19 PROCEDURE — 99212 OFFICE O/P EST SF 10 MIN: CPT | Performed by: INTERNAL MEDICINE

## 2024-09-19 PROCEDURE — 3078F DIAST BP <80 MM HG: CPT | Performed by: INTERNAL MEDICINE

## 2024-09-19 PROCEDURE — 93010 ELECTROCARDIOGRAM REPORT: CPT | Performed by: INTERNAL MEDICINE

## 2024-09-19 PROCEDURE — 99215 OFFICE O/P EST HI 40 MIN: CPT | Performed by: INTERNAL MEDICINE

## 2024-09-19 PROCEDURE — 3074F SYST BP LT 130 MM HG: CPT | Performed by: INTERNAL MEDICINE

## 2024-09-19 PROCEDURE — 93005 ELECTROCARDIOGRAM TRACING: CPT | Performed by: INTERNAL MEDICINE

## 2024-09-19 RX ORDER — FINERENONE 10 MG/1
10 TABLET, FILM COATED ORAL DAILY
Qty: 30 TABLET | Refills: 11 | Status: SHIPPED | OUTPATIENT
Start: 2024-09-19

## 2024-09-19 RX ORDER — MULTIVIT WITH MINERALS/LUTEIN
TABLET ORAL
COMMUNITY

## 2024-09-19 RX ORDER — FUROSEMIDE 20 MG
20 TABLET ORAL
COMMUNITY
Start: 2024-09-11 | End: 2024-09-19

## 2024-09-19 ASSESSMENT — ENCOUNTER SYMPTOMS
FALLS: 0
DIZZINESS: 0
DEPRESSION: 0
EYE PAIN: 0
SHORTNESS OF BREATH: 1
BRUISES/BLEEDS EASILY: 0
WEIGHT LOSS: 0
PND: 0
DOUBLE VISION: 0
EYE DISCHARGE: 0
LOSS OF CONSCIOUSNESS: 0
ABDOMINAL PAIN: 0
CHILLS: 0
SENSORY CHANGE: 0
FEVER: 0
PALPITATIONS: 0
BLOOD IN STOOL: 0
ORTHOPNEA: 0
CLAUDICATION: 0
COUGH: 0
VOMITING: 0
SPEECH CHANGE: 0
HALLUCINATIONS: 0
MYALGIAS: 0
BLURRED VISION: 0
NAUSEA: 0
HEADACHES: 0

## 2024-09-19 ASSESSMENT — FIBROSIS 4 INDEX: FIB4 SCORE: 1.461538461538461538

## 2024-09-19 NOTE — PROGRESS NOTES
Chief Complaint   Patient presents with    Congestive Heart Failure     F/v dx: ACC/AHA stage C heart failure with preserved ejection fraction (HCC)    Atrial Fibrillation    Dyslipidemia       Subjective     Veneracion Reyes Villagracia is a 89 y.o. female who presents today for cardiac care and management in heart failure clinic due to recent hospitalization for heart failure exacerbation.  She does have a diagnosis of heart failure with preserved ejection fraction.  Patient is not a good candidate for SGLT2 inhibitor due to recurrent UTIs.    Patient still gets winded with daily living activities and exertion. No symptoms at rest.    I have independently interpreted and reviewed echocardiogram's actual images with patient which showed normal left ventricular systolic function. No wall motion abnormality. No evidence of pulmonary hypertension. No significant valvular disease.    I have independently interpreted and reviewed blood tests results with patient in clinic which show GFR of 55, NT pro BNP of 3534, K of 5.2.    I have personally interpreted EKG today with patient, there is no evidence of acute coronary syndrome, no evidence of prior infarct, normal NE and QT interval, no significant conduction disease. Sinus bradycardia.        Past Medical History:   Diagnosis Date    Arthritis     knees    Cataract     jesse iol    Dental disorder     full dentures    GI bleed     in the [facility]    Heart burn     High cholesterol     Hypercholesteremia     Hypertension     pt states controlled on meds    Urinary tract infection 08/2019    currently on antibiotics for uti     Past Surgical History:   Procedure Laterality Date    ANTERIOR AND POSTERIOR REPAIR N/A 9/3/2019    Procedure: COLPORRHAPHY, COMBINED ANTEROPOSTERIOR - W/PERINEOPLASTY;  Surgeon: Jim Boykin M.D.;  Location: SURGERY SAME DAY [facility];  Service: Gynecology    ENTEROCELE REPAIR N/A 9/3/2019    Procedure: REPAIR, ENTEROCELE;  Surgeon: Jim  OLYA Boykin M.D.;  Location: SURGERY SAME DAY [facility];  Service: Gynecology    BLADDER SLING FEMALE N/A 9/3/2019    Procedure: BLADDER SLING, FEMALE - TOT;  Surgeon: Jim Boykin M.D.;  Location: SURGERY SAME DAY [facility];  Service: Gynecology    VAGINAL SUSPENSION N/A 9/3/2019    Procedure: COLPOPEXY - SACROSPINOUS VAULT SUSPENSION;  Surgeon: Jim Boykin M.D.;  Location: SURGERY SAME DAY [facility];  Service: Gynecology    BREAST BIOPSY Left 6/14/2018    Procedure: BREAST BIOPSY - EXCISION MASS;  Surgeon: Kacie Lizama M.D.;  Location: SURGERY [facility];  Service: General    OTHER      Yony cataracts     Family History   Problem Relation Age of Onset    Stroke Father     Breast Cancer Sister     Alcohol abuse Brother      Social History     Socioeconomic History    Marital status:      Spouse name: Not on file    Number of children: Not on file    Years of education: Not on file    Highest education level: Not on file   Occupational History    Not on file   Tobacco Use    Smoking status: Never    Smokeless tobacco: Never   Vaping Use    Vaping status: Never Used   Substance and Sexual Activity    Alcohol use: No    Drug use: No    Sexual activity: Never     Partners: Male   Other Topics Concern    Not on file   Social History Narrative    Not on file     Social Determinants of Health     Financial Resource Strain: Not on file   Food Insecurity: No Food Insecurity (9/5/2024)    Hunger Vital Sign     Worried About Running Out of Food in the Last Year: Never true     Ran Out of Food in the Last Year: Never true   Transportation Needs: No Transportation Needs (9/5/2024)    PRAPARE - Transportation     Lack of Transportation (Medical): No     Lack of Transportation (Non-Medical): No   Physical Activity: Not on file   Stress: Not on file   Social Connections: Not on file   Intimate Partner Violence: Not At Risk (9/5/2024)    Humiliation, Afraid, Rape, and Kick questionnaire     Fear of  Current or Ex-Partner: No     Emotionally Abused: No     Physically Abused: No     Sexually Abused: No   Housing Stability: Low Risk  (9/5/2024)    Housing Stability Vital Sign     Unable to Pay for Housing in the Last Year: No     Number of Times Moved in the Last Year: 0     Homeless in the Last Year: No     Allergies   Allergen Reactions    Penicillins Shortness of Breath and Rash     Difficulty breathing rash    Bloodless      Outpatient Encounter Medications as of 9/19/2024   Medication Sig Dispense Refill    Melatonin 1 MG Cap Take  by mouth.      Ascorbic Acid (VITAMIN C) 1000 MG Tab Take  by mouth.      apixaban (ELIQUIS) 2.5mg Tab Take 1 Tablet by mouth 2 times a day. 180 Tablet 4    Finerenone (KERENDIA) 10 MG Tab Take 10 mg by mouth every day. 30 Tablet 11    acetaminophen (TYLENOL) 325 MG Tab Take 2 Tablets by mouth every 6 hours as needed for Moderate Pain. 30 Tablet 0    amLODIPine (NORVASC) 5 MG Tab Take 1 Tablet by mouth every day. 30 Tablet 0    guaiFENesin ER (MUCINEX) 600 MG TABLET SR 12 HR Take 1 Tablet by mouth 2 times a day as needed for Cough (productive cough). 28 Tablet 0    lovastatin (MEVACOR) 20 MG Tab Take 1 Tablet by mouth every day. 90 Tablet 3    losartan (COZAAR) 100 MG Tab Take 1 Tablet by mouth every day. 90 Tablet 3    [DISCONTINUED] furosemide (LASIX) 20 MG Tab Take 20 mg by mouth every day. (Patient not taking: Reported on 9/19/2024)      [DISCONTINUED] metoprolol tartrate (LOPRESSOR) 25 MG Tab Take 0.5 Tablets by mouth 2 times a day. 60 Tablet 0    [DISCONTINUED] apixaban (ELIQUIS) 5mg Tab Take 1 Tablet by mouth 2 times a day. 180 Tablet 1     No facility-administered encounter medications on file as of 9/19/2024.     Review of Systems   Constitutional:  Negative for chills, fever, malaise/fatigue and weight loss.   HENT:  Negative for ear discharge, ear pain, hearing loss and nosebleeds.    Eyes:  Negative for blurred vision, double vision, pain and discharge.   Respiratory:   "Positive for shortness of breath. Negative for cough.    Cardiovascular:  Negative for chest pain, palpitations, orthopnea, claudication, leg swelling and PND.   Gastrointestinal:  Negative for abdominal pain, blood in stool, melena, nausea and vomiting.   Genitourinary:  Negative for dysuria and hematuria.   Musculoskeletal:  Negative for falls, joint pain and myalgias.   Skin:  Negative for itching and rash.   Neurological:  Negative for dizziness, sensory change, speech change, loss of consciousness and headaches.   Endo/Heme/Allergies:  Negative for environmental allergies. Does not bruise/bleed easily.   Psychiatric/Behavioral:  Negative for depression, hallucinations and suicidal ideas.               Objective     /70 (BP Location: Left arm, Patient Position: Sitting, BP Cuff Size: Adult)   Pulse (!) 51   Resp 16   Ht 1.575 m (5' 2\")   Wt 68.5 kg (151 lb)   SpO2 100%   BMI 27.62 kg/m²     Physical Exam  Vitals and nursing note reviewed.   Constitutional:       General: She is not in acute distress.     Appearance: She is not diaphoretic.   HENT:      Head: Normocephalic and atraumatic.      Right Ear: External ear normal.      Left Ear: External ear normal.      Nose: No congestion or rhinorrhea.   Eyes:      General:         Right eye: No discharge.         Left eye: No discharge.   Neck:      Thyroid: No thyromegaly.      Vascular: No JVD.   Cardiovascular:      Rate and Rhythm: Normal rate and regular rhythm.      Pulses: Normal pulses.   Pulmonary:      Effort: No respiratory distress.   Abdominal:      General: There is no distension.      Tenderness: There is no abdominal tenderness.   Musculoskeletal:         General: No swelling or tenderness.      Right lower leg: No edema.      Left lower leg: No edema.   Skin:     General: Skin is warm and dry.   Neurological:      Mental Status: She is alert and oriented to person, place, and time.      Cranial Nerves: No cranial nerve deficit. "   Psychiatric:         Behavior: Behavior normal.                Assessment & Plan     1. ACC/AHA stage C heart failure with preserved ejection fraction (HCC)  Finerenone (KERENDIA) 10 MG Tab      2. Heart failure, NYHA class 2 (HCC)  Finerenone (KERENDIA) 10 MG Tab      3. Paroxysmal atrial fibrillation (HCC)  EKG    apixaban (ELIQUIS) 2.5mg Tab      4. HTN (hypertension), malignant        5. Chronic kidney disease, stage 3a        6. Secondary hypercoagulable state (HCC)        7. Dyspnea on exertion  Finerenone (KERENDIA) 10 MG Tab    proBrain Natriuretic Peptide, NT      8. High risk medication use  Basic Metabolic Panel      9. Stage 3a chronic kidney disease  Finerenone (KERENDIA) 10 MG Tab          Medical Decision Making: Today's Assessment/Status/Plan:   Today, based on physical examination findings, patient is euvolemic. No JVD, lungs are clear to auscultation, no pitting edema in bilateral lower extremities, no ascites.    Dry weight is 151 lbs.    Due to bradycardia, will stop Metoprolol.    Will reduce Eliquis to 2.5 mg BID due to concern for bleeding and also with reduced GFR in setting of advanced age of 89.    Blood pressure is well controlled. Continue Amlodipine 5 mg daily and Losartan 100 mg daily.    Trial of Finerinone due to CKD along with high BP and benefits for CHF.    This visit encounter signifies the visit complexity inherent to evaluation and management associated with medical care services that serve as the continuing focal point for all needed health care services and/or with medical care services that are part of ongoing care related to this patient's single, serious condition, complex cardiac condition.    David Sorensen M.D.

## 2024-09-21 ENCOUNTER — APPOINTMENT (OUTPATIENT)
Dept: RADIOLOGY | Facility: MEDICAL CENTER | Age: 89
DRG: 394 | End: 2024-09-21
Attending: EMERGENCY MEDICINE
Payer: MEDICARE

## 2024-09-21 ENCOUNTER — HOSPITAL ENCOUNTER (INPATIENT)
Facility: MEDICAL CENTER | Age: 89
LOS: 2 days | DRG: 394 | End: 2024-09-23
Attending: EMERGENCY MEDICINE | Admitting: STUDENT IN AN ORGANIZED HEALTH CARE EDUCATION/TRAINING PROGRAM
Payer: MEDICARE

## 2024-09-21 DIAGNOSIS — K62.5 RECTAL BLEEDING: ICD-10-CM

## 2024-09-21 DIAGNOSIS — K92.1 HEMATOCHEZIA: ICD-10-CM

## 2024-09-21 DIAGNOSIS — R74.8 ELEVATED LIPASE: ICD-10-CM

## 2024-09-21 LAB
ALBUMIN SERPL BCP-MCNC: 3.4 G/DL (ref 3.2–4.9)
ALBUMIN/GLOB SERPL: 1.2 G/DL
ALP SERPL-CCNC: 74 U/L (ref 30–99)
ALT SERPL-CCNC: 15 U/L (ref 2–50)
ANION GAP SERPL CALC-SCNC: 11 MMOL/L (ref 7–16)
APTT PPP: 34.7 SEC (ref 24.7–36)
AST SERPL-CCNC: 21 U/L (ref 12–45)
BASOPHILS # BLD AUTO: 0.2 % (ref 0–1.8)
BASOPHILS # BLD: 0.02 K/UL (ref 0–0.12)
BILIRUB SERPL-MCNC: 0.3 MG/DL (ref 0.1–1.5)
BUN SERPL-MCNC: 33 MG/DL (ref 8–22)
CALCIUM ALBUM COR SERPL-MCNC: 8.9 MG/DL (ref 8.5–10.5)
CALCIUM SERPL-MCNC: 8.4 MG/DL (ref 8.5–10.5)
CFT BLD TEG: 9.7 MIN (ref 4.6–9.1)
CFT P HPASE BLD TEG: 9.5 MIN (ref 4.3–8.3)
CHLORIDE SERPL-SCNC: 98 MMOL/L (ref 96–112)
CLOT ANGLE BLD TEG: 72.2 DEGREES (ref 63–78)
CLOT LYSIS 30M P MA LENFR BLD TEG: 0 % (ref 0–2.6)
CO2 SERPL-SCNC: 16 MMOL/L (ref 20–33)
CREAT SERPL-MCNC: 1.13 MG/DL (ref 0.5–1.4)
CT.EXTRINSIC BLD ROTEM: 1.3 MIN (ref 0.8–2.1)
EOSINOPHIL # BLD AUTO: 0.14 K/UL (ref 0–0.51)
EOSINOPHIL NFR BLD: 1.6 % (ref 0–6.9)
ERYTHROCYTE [DISTWIDTH] IN BLOOD BY AUTOMATED COUNT: 48.9 FL (ref 35.9–50)
GFR SERPLBLD CREATININE-BSD FMLA CKD-EPI: 46 ML/MIN/1.73 M 2
GLOBULIN SER CALC-MCNC: 2.9 G/DL (ref 1.9–3.5)
GLUCOSE SERPL-MCNC: 107 MG/DL (ref 65–99)
HCT VFR BLD AUTO: 34.9 % (ref 37–47)
HGB BLD-MCNC: 11.4 G/DL (ref 12–16)
IMM GRANULOCYTES # BLD AUTO: 0.05 K/UL (ref 0–0.11)
IMM GRANULOCYTES NFR BLD AUTO: 0.6 % (ref 0–0.9)
INR PPP: 1.35 (ref 0.87–1.13)
LIPASE SERPL-CCNC: 130 U/L (ref 11–82)
LYMPHOCYTES # BLD AUTO: 1.94 K/UL (ref 1–4.8)
LYMPHOCYTES NFR BLD: 22.8 % (ref 22–41)
MCF BLD TEG: 65 MM (ref 52–69)
MCF.PLATELET INHIB BLD ROTEM: 28.3 MM (ref 15–32)
MCH RBC QN AUTO: 32.9 PG (ref 27–33)
MCHC RBC AUTO-ENTMCNC: 32.7 G/DL (ref 32.2–35.5)
MCV RBC AUTO: 100.9 FL (ref 81.4–97.8)
MONOCYTES # BLD AUTO: 1.16 K/UL (ref 0–0.85)
MONOCYTES NFR BLD AUTO: 13.6 % (ref 0–13.4)
NEUTROPHILS # BLD AUTO: 5.2 K/UL (ref 1.82–7.42)
NEUTROPHILS NFR BLD: 61.2 % (ref 44–72)
NRBC # BLD AUTO: 0 K/UL
NRBC BLD-RTO: 0 /100 WBC (ref 0–0.2)
PA AA BLD-ACNC: 0 % (ref 0–11)
PA ADP BLD-ACNC: 2.1 % (ref 0–17)
PLATELET # BLD AUTO: 186 K/UL (ref 164–446)
PMV BLD AUTO: 9.9 FL (ref 9–12.9)
POTASSIUM SERPL-SCNC: 4.6 MMOL/L (ref 3.6–5.5)
PROT SERPL-MCNC: 6.3 G/DL (ref 6–8.2)
PROTHROMBIN TIME: 16.7 SEC (ref 12–14.6)
RBC # BLD AUTO: 3.46 M/UL (ref 4.2–5.4)
SODIUM SERPL-SCNC: 125 MMOL/L (ref 135–145)
TEG ALGORITHM TGALG: ABNORMAL
WBC # BLD AUTO: 8.5 K/UL (ref 4.8–10.8)

## 2024-09-21 PROCEDURE — 85014 HEMATOCRIT: CPT

## 2024-09-21 PROCEDURE — A9270 NON-COVERED ITEM OR SERVICE: HCPCS | Performed by: STUDENT IN AN ORGANIZED HEALTH CARE EDUCATION/TRAINING PROGRAM

## 2024-09-21 PROCEDURE — 86850 RBC ANTIBODY SCREEN: CPT

## 2024-09-21 PROCEDURE — 36415 COLL VENOUS BLD VENIPUNCTURE: CPT

## 2024-09-21 PROCEDURE — 86901 BLOOD TYPING SEROLOGIC RH(D): CPT

## 2024-09-21 PROCEDURE — 99223 1ST HOSP IP/OBS HIGH 75: CPT | Mod: 25,AI | Performed by: STUDENT IN AN ORGANIZED HEALTH CARE EDUCATION/TRAINING PROGRAM

## 2024-09-21 PROCEDURE — 80048 BASIC METABOLIC PNL TOTAL CA: CPT

## 2024-09-21 PROCEDURE — 770006 HCHG ROOM/CARE - MED/SURG/GYN SEMI*

## 2024-09-21 PROCEDURE — 700102 HCHG RX REV CODE 250 W/ 637 OVERRIDE(OP)

## 2024-09-21 PROCEDURE — 700102 HCHG RX REV CODE 250 W/ 637 OVERRIDE(OP): Performed by: STUDENT IN AN ORGANIZED HEALTH CARE EDUCATION/TRAINING PROGRAM

## 2024-09-21 PROCEDURE — 83690 ASSAY OF LIPASE: CPT

## 2024-09-21 PROCEDURE — 85018 HEMOGLOBIN: CPT

## 2024-09-21 PROCEDURE — 80053 COMPREHEN METABOLIC PANEL: CPT

## 2024-09-21 PROCEDURE — 86900 BLOOD TYPING SEROLOGIC ABO: CPT

## 2024-09-21 PROCEDURE — 99497 ADVNCD CARE PLAN 30 MIN: CPT | Performed by: STUDENT IN AN ORGANIZED HEALTH CARE EDUCATION/TRAINING PROGRAM

## 2024-09-21 PROCEDURE — 700111 HCHG RX REV CODE 636 W/ 250 OVERRIDE (IP): Performed by: STUDENT IN AN ORGANIZED HEALTH CARE EDUCATION/TRAINING PROGRAM

## 2024-09-21 PROCEDURE — 74174 CTA ABD&PLVS W/CONTRAST: CPT

## 2024-09-21 PROCEDURE — 700117 HCHG RX CONTRAST REV CODE 255: Mod: UD | Performed by: EMERGENCY MEDICINE

## 2024-09-21 PROCEDURE — 99222 1ST HOSP IP/OBS MODERATE 55: CPT | Mod: AI | Performed by: INTERNAL MEDICINE

## 2024-09-21 PROCEDURE — 85347 COAGULATION TIME ACTIVATED: CPT

## 2024-09-21 PROCEDURE — 85384 FIBRINOGEN ACTIVITY: CPT

## 2024-09-21 PROCEDURE — A9270 NON-COVERED ITEM OR SERVICE: HCPCS

## 2024-09-21 PROCEDURE — 85576 BLOOD PLATELET AGGREGATION: CPT | Mod: 91

## 2024-09-21 PROCEDURE — 99285 EMERGENCY DEPT VISIT HI MDM: CPT

## 2024-09-21 PROCEDURE — 85610 PROTHROMBIN TIME: CPT

## 2024-09-21 PROCEDURE — 85025 COMPLETE CBC W/AUTO DIFF WBC: CPT

## 2024-09-21 PROCEDURE — 85730 THROMBOPLASTIN TIME PARTIAL: CPT

## 2024-09-21 RX ORDER — LOSARTAN POTASSIUM 50 MG/1
100 TABLET ORAL DAILY
Status: DISCONTINUED | OUTPATIENT
Start: 2024-09-22 | End: 2024-09-23 | Stop reason: HOSPADM

## 2024-09-21 RX ORDER — LOVASTATIN 20 MG/1
20 TABLET ORAL EVERY EVENING
Status: DISCONTINUED | OUTPATIENT
Start: 2024-09-21 | End: 2024-09-23 | Stop reason: HOSPADM

## 2024-09-21 RX ORDER — ONDANSETRON 2 MG/ML
4 INJECTION INTRAMUSCULAR; INTRAVENOUS EVERY 4 HOURS PRN
Status: DISCONTINUED | OUTPATIENT
Start: 2024-09-21 | End: 2024-09-23 | Stop reason: HOSPADM

## 2024-09-21 RX ORDER — ONDANSETRON 4 MG/1
4 TABLET, ORALLY DISINTEGRATING ORAL EVERY 4 HOURS PRN
Status: DISCONTINUED | OUTPATIENT
Start: 2024-09-21 | End: 2024-09-23 | Stop reason: HOSPADM

## 2024-09-21 RX ORDER — LABETALOL HYDROCHLORIDE 5 MG/ML
10 INJECTION, SOLUTION INTRAVENOUS EVERY 4 HOURS PRN
Status: DISCONTINUED | OUTPATIENT
Start: 2024-09-21 | End: 2024-09-23 | Stop reason: HOSPADM

## 2024-09-21 RX ORDER — CEFPODOXIME PROXETIL 200 MG/1
200 TABLET, FILM COATED ORAL 2 TIMES DAILY
COMMUNITY
Start: 2024-09-10

## 2024-09-21 RX ORDER — HYDROXYZINE HYDROCHLORIDE 25 MG/1
25 TABLET, FILM COATED ORAL 3 TIMES DAILY PRN
COMMUNITY

## 2024-09-21 RX ORDER — PANTOPRAZOLE SODIUM 40 MG/10ML
40 INJECTION, POWDER, LYOPHILIZED, FOR SOLUTION INTRAVENOUS 2 TIMES DAILY
Status: DISCONTINUED | OUTPATIENT
Start: 2024-09-21 | End: 2024-09-23 | Stop reason: HOSPADM

## 2024-09-21 RX ORDER — AMLODIPINE BESYLATE 5 MG/1
5 TABLET ORAL DAILY
Status: DISCONTINUED | OUTPATIENT
Start: 2024-09-21 | End: 2024-09-23 | Stop reason: HOSPADM

## 2024-09-21 RX ORDER — SODIUM CHLORIDE, SODIUM LACTATE, POTASSIUM CHLORIDE, CALCIUM CHLORIDE 600; 310; 30; 20 MG/100ML; MG/100ML; MG/100ML; MG/100ML
INJECTION, SOLUTION INTRAVENOUS CONTINUOUS
Status: ACTIVE | OUTPATIENT
Start: 2024-09-22 | End: 2024-09-22

## 2024-09-21 RX ORDER — DEXAMETHASONE 6 MG/1
6 TABLET ORAL DAILY
COMMUNITY

## 2024-09-21 RX ORDER — HYDROXYZINE HYDROCHLORIDE 50 MG/1
25 TABLET, FILM COATED ORAL 3 TIMES DAILY PRN
Status: DISCONTINUED | OUTPATIENT
Start: 2024-09-21 | End: 2024-09-23 | Stop reason: HOSPADM

## 2024-09-21 RX ORDER — ACETAMINOPHEN 325 MG/1
650 TABLET ORAL EVERY 6 HOURS PRN
Status: DISCONTINUED | OUTPATIENT
Start: 2024-09-21 | End: 2024-09-23 | Stop reason: HOSPADM

## 2024-09-21 RX ADMIN — Medication 5 MG: at 21:07

## 2024-09-21 RX ADMIN — PANTOPRAZOLE SODIUM 40 MG: 40 INJECTION, POWDER, FOR SOLUTION INTRAVENOUS at 18:32

## 2024-09-21 RX ADMIN — IOHEXOL 94 ML: 350 INJECTION, SOLUTION INTRAVENOUS at 15:45

## 2024-09-21 RX ADMIN — LOVASTATIN 20 MG: 20 TABLET ORAL at 18:32

## 2024-09-21 SDOH — ECONOMIC STABILITY: TRANSPORTATION INSECURITY
IN THE PAST 12 MONTHS, HAS THE LACK OF TRANSPORTATION KEPT YOU FROM MEDICAL APPOINTMENTS OR FROM GETTING MEDICATIONS?: YES

## 2024-09-21 SDOH — ECONOMIC STABILITY: TRANSPORTATION INSECURITY
IN THE PAST 12 MONTHS, HAS LACK OF RELIABLE TRANSPORTATION KEPT YOU FROM MEDICAL APPOINTMENTS, MEETINGS, WORK OR FROM GETTING THINGS NEEDED FOR DAILY LIVING?: YES

## 2024-09-21 ASSESSMENT — COGNITIVE AND FUNCTIONAL STATUS - GENERAL
DAILY ACTIVITIY SCORE: 24
SUGGESTED CMS G CODE MODIFIER MOBILITY: CJ
SUGGESTED CMS G CODE MODIFIER DAILY ACTIVITY: CH
WALKING IN HOSPITAL ROOM: A LITTLE
CLIMB 3 TO 5 STEPS WITH RAILING: A LITTLE
MOBILITY SCORE: 22

## 2024-09-21 ASSESSMENT — LIFESTYLE VARIABLES
HOW MANY TIMES IN THE PAST YEAR HAVE YOU HAD 5 OR MORE DRINKS IN A DAY: 0
ALCOHOL_USE: NO
EVER FELT BAD OR GUILTY ABOUT YOUR DRINKING: NO
AVERAGE NUMBER OF DAYS PER WEEK YOU HAVE A DRINK CONTAINING ALCOHOL: 0
HAVE PEOPLE ANNOYED YOU BY CRITICIZING YOUR DRINKING: NO
TOTAL SCORE: 0
EVER HAD A DRINK FIRST THING IN THE MORNING TO STEADY YOUR NERVES TO GET RID OF A HANGOVER: NO
ON A TYPICAL DAY WHEN YOU DRINK ALCOHOL HOW MANY DRINKS DO YOU HAVE: 0
CONSUMPTION TOTAL: NEGATIVE
HAVE YOU EVER FELT YOU SHOULD CUT DOWN ON YOUR DRINKING: NO
DOES PATIENT WANT TO STOP DRINKING: NO

## 2024-09-21 ASSESSMENT — PATIENT HEALTH QUESTIONNAIRE - PHQ9
2. FEELING DOWN, DEPRESSED, IRRITABLE, OR HOPELESS: NOT AT ALL
2. FEELING DOWN, DEPRESSED, IRRITABLE, OR HOPELESS: NOT AT ALL
SUM OF ALL RESPONSES TO PHQ9 QUESTIONS 1 AND 2: 0
SUM OF ALL RESPONSES TO PHQ9 QUESTIONS 1 AND 2: 0
1. LITTLE INTEREST OR PLEASURE IN DOING THINGS: NOT AT ALL
1. LITTLE INTEREST OR PLEASURE IN DOING THINGS: NOT AT ALL

## 2024-09-21 ASSESSMENT — ENCOUNTER SYMPTOMS
CARDIOVASCULAR NEGATIVE: 1
EYES NEGATIVE: 1
WEAKNESS: 1
PSYCHIATRIC NEGATIVE: 1
NEUROLOGICAL NEGATIVE: 1
ABDOMINAL PAIN: 1
BLOOD IN STOOL: 1
RESPIRATORY NEGATIVE: 1
MUSCULOSKELETAL NEGATIVE: 1

## 2024-09-21 ASSESSMENT — SOCIAL DETERMINANTS OF HEALTH (SDOH)
WITHIN THE LAST YEAR, HAVE YOU BEEN HUMILIATED OR EMOTIONALLY ABUSED IN OTHER WAYS BY YOUR PARTNER OR EX-PARTNER?: NO
WITHIN THE LAST YEAR, HAVE TO BEEN RAPED OR FORCED TO HAVE ANY KIND OF SEXUAL ACTIVITY BY YOUR PARTNER OR EX-PARTNER?: NO
WITHIN THE PAST 12 MONTHS, THE FOOD YOU BOUGHT JUST DIDN'T LAST AND YOU DIDN'T HAVE MONEY TO GET MORE: NEVER TRUE
WITHIN THE LAST YEAR, HAVE YOU BEEN AFRAID OF YOUR PARTNER OR EX-PARTNER?: NO
IN THE PAST 12 MONTHS, HAS THE ELECTRIC, GAS, OIL, OR WATER COMPANY THREATENED TO SHUT OFF SERVICE IN YOUR HOME?: NO
WITHIN THE LAST YEAR, HAVE YOU BEEN KICKED, HIT, SLAPPED, OR OTHERWISE PHYSICALLY HURT BY YOUR PARTNER OR EX-PARTNER?: NO
WITHIN THE PAST 12 MONTHS, YOU WORRIED THAT YOUR FOOD WOULD RUN OUT BEFORE YOU GOT THE MONEY TO BUY MORE: NEVER TRUE

## 2024-09-21 ASSESSMENT — PAIN DESCRIPTION - PAIN TYPE
TYPE: ACUTE PAIN
TYPE: ACUTE PAIN

## 2024-09-21 ASSESSMENT — FIBROSIS 4 INDEX
FIB4 SCORE: 1.461538461538461538
FIB4 SCORE: 2.68

## 2024-09-21 NOTE — ASSESSMENT & PLAN NOTE
Followed closely by cardiology  Recently discontinued metoprolol due to bradycardia  Holding apixaban due to GI bleed

## 2024-09-21 NOTE — ASSESSMENT & PLAN NOTE
Associated with abdominal pain.  4 episodes of bright red blood per rectum.  Hb 11.4 on admission; baseline 13-14.  Down to 10.3 this afternoon.  No additional episodes of BRBPR  - Gastroenterology is following; discussed with EVERARDO Parker  - Recommending colonoscopy and possible EGD for further evaluation  - Continue n.p.o. status at this time

## 2024-09-21 NOTE — ED NOTES
Pt to [facility] 28 via w/c accompanied by daughter.  Provided a gown to change into.  Placed up for ERP evaluation.

## 2024-09-21 NOTE — ASSESSMENT & PLAN NOTE
I had a long conversation with patient and her daughter Ashli at bedside.  We discussed that without knowing if bleeding will persist it will be difficult to resume anticoagulation.  They are agreeable to proceeding forward with scoping to determine etiology of bleeding.  This will allow them to make a decision about restarting anticoagulation moving forward.  Of course, given her atrial fibrillation-patient is at risk for stroke without Eliquis on board.  Discussed with EVERARDO Parker-gastroenterology.  ACP time spent 19 minutes.

## 2024-09-21 NOTE — ED TRIAGE NOTES
"Chief Complaint   Patient presents with    Lower GI Bleed     Bright red blood to stool x 4 since this am. No streaks. Daughter reports pt is not bloodless. + dizziness, + weakness. - AB pain. Feels like she needs to poop. + thinners     Pt wheeled from lobby with steady gait. Skin signs pale, warm, moist. Pt speaking full sentences, A & O x 4. Respirations equal and unlabored. Pt educated on triage process and to alert staff of any changing conditions. Pt returned to phlebotomy.     /52   Pulse 63   Temp 36.5 °C (97.7 °F) (Temporal)   Resp 18   Ht 1.499 m (4' 11\")   Wt 69.4 kg (153 lb)   SpO2 94%   BMI 30.90 kg/m²       "

## 2024-09-21 NOTE — ED PROVIDER NOTES
ER Provider Note    Scribed for eKena Buckley M.d. by Leonor Rust. 9/21/2024  1:14 PM    Primary Care Provider: ISMA Monte    CHIEF COMPLAINT   Chief Complaint   Patient presents with    Lower GI Bleed     Bright red blood to stool x 4 since this am. No streaks. Daughter reports pt is not bloodless. + dizziness, + weakness. - AB pain. Feels like she needs to poop. + thinners     EXTERNAL RECORDS REVIEWED  Hospital records reviewed showed that the patient was last seen on 9/19 by cardiology. She has a history of heart failure ad atrial fibrillation. Patient was taken off her Metoprolol due to bradycardic and her Eliquis was reduced from 5 mg to 2.5 mg due to concern of bleeding. The patient was just admitted to [facility] 9/5-10 for painful urination due to recurrent UTIs. She was also found to be COVID positive and hypoxic. The patient is on oxygen at night only. Her creatinine bumped to 1.5 during the most recent hospitalization.     HPI/ROS  LIMITATION TO HISTORY    Language Tagalog, Daughter used as   OUTSIDE HISTORIAN(S):  Daughter at bedside who endorsed the patent's symptoms and provided additional history as seen below.     Veneracion Reyes Villagracia is a 89 y.o. female who presents to the ED complaining of 4 episodes of bright red stools onset this morning. Patient denies the blood being a dark color in her stool. She has had four episodes of the bright red stools. Daughter reports that the patient sensation like she needs to have a bowel movement.  She started having urge to defecate yesterday.  Denies any fevers, or chills. Patient endorses some reflux sensation after drinking coffee. Denies any abdominal pain. Daughter adds that the patient had not been able to void since yesterday. Patient is on 2.5 mg of Eliquis and her last dose was last night. Patient denies bleeding from elsewhere in her body. Daughter added that the patient has seemed weak since her most recent admission. She  also noted that the patient's most recent hemoglobin earlier this week was 14. Patient lives with her other daughter.     PAST MEDICAL HISTORY  Past Medical History:   Diagnosis Date    Arthritis     knees    Cataract     yony iol    Dental disorder     full dentures    GI bleed     in the [facility]    Heart burn     High cholesterol     Hypercholesteremia     Hypertension     pt states controlled on meds    Urinary tract infection 08/2019    currently on antibiotics for uti       SURGICAL HISTORY  Past Surgical History:   Procedure Laterality Date    ANTERIOR AND POSTERIOR REPAIR N/A 9/3/2019    Procedure: COLPORRHAPHY, COMBINED ANTEROPOSTERIOR - W/PERINEOPLASTY;  Surgeon: Jim Boykin M.D.;  Location: SURGERY SAME DAY [facility];  Service: Gynecology    ENTEROCELE REPAIR N/A 9/3/2019    Procedure: REPAIR, ENTEROCELE;  Surgeon: Jim Boykin M.D.;  Location: SURGERY SAME DAY [facility];  Service: Gynecology    BLADDER SLING FEMALE N/A 9/3/2019    Procedure: BLADDER SLING, FEMALE - TOT;  Surgeon: Jim Boykin M.D.;  Location: SURGERY SAME DAY [facility];  Service: Gynecology    VAGINAL SUSPENSION N/A 9/3/2019    Procedure: COLPOPEXY - SACROSPINOUS VAULT SUSPENSION;  Surgeon: Jim Boykin M.D.;  Location: SURGERY SAME DAY [facility];  Service: Gynecology    BREAST BIOPSY Left 6/14/2018    Procedure: BREAST BIOPSY - EXCISION MASS;  Surgeon: Kacie Lizama M.D.;  Location: SURGERY [facility];  Service: General    OTHER      Yony cataracts       FAMILY HISTORY  Family History   Problem Relation Age of Onset    Stroke Father     Breast Cancer Sister     Alcohol abuse Brother        SOCIAL HISTORY   reports that she has never smoked. She has never used smokeless tobacco. She reports that she does not drink alcohol and does not use drugs.    CURRENT MEDICATIONS  Previous Medications    ACETAMINOPHEN (TYLENOL) 325 MG TAB    Take 2 Tablets by mouth every 6 hours as needed for Moderate Pain.     "AMLODIPINE (NORVASC) 5 MG TAB    Take 1 Tablet by mouth every day.    APIXABAN (ELIQUIS) 2.5MG TAB    Take 1 Tablet by mouth 2 times a day.    ASCORBIC ACID (VITAMIN C) 1000 MG TAB    Take  by mouth.    FINERENONE (KERENDIA) 10 MG TAB    Take 10 mg by mouth every day.    GUAIFENESIN ER (MUCINEX) 600 MG TABLET SR 12 HR    Take 1 Tablet by mouth 2 times a day as needed for Cough (productive cough).    LOSARTAN (COZAAR) 100 MG TAB    Take 1 Tablet by mouth every day.    LOVASTATIN (MEVACOR) 20 MG TAB    Take 1 Tablet by mouth every day.    MELATONIN 1 MG CAP    Take  by mouth.       ALLERGIES  Penicillins and Bloodless    PHYSICAL EXAM  /52   Pulse 63   Temp 36.5 °C (97.7 °F) (Temporal)   Resp 18   Ht 1.499 m (4' 11\")   Wt 69.4 kg (153 lb)   SpO2 94%   BMI 30.90 kg/m²   Constitutional: Well developed, well nourished; No acute distress; Non-toxic appearance.   HENT: Normocephalic, atraumatic; Bilateral external ears normal; Oropharyngeal exam deferred due to COVID-19 outbreak and lack of oropharyngeal complaints.   Eyes: PERRL, EOMI, Conjunctiva normal. No discharge.   Neck:  Supple, nontender midline; No stridor; No nuchal rigidity.   Lymphatic: No cervical lymphadenopathy noted.   Cardiovascular: Regular rate and rhythm without murmurs, rubs, or gallop.   Thorax & Lungs: No respiratory distress, breath sounds clear to auscultation bilaterally without wheezing, rales or rhonchi. Nontender chest wall. No crepitus or subcutaneous air  Abdomen: Soft, Mild tenderness in the suprapubic region and left lower quadrant bowel sounds normal. No obvious masses; No pulsatile masses; no rebound, guarding, or peritoneal signs.   Skin: Good color; warm and dry without rash or petechia.  Back: Nontender, No CVA tenderness.   Genitalia: External genitalia appear normal, no blood coming from the vagina.  Rectal: Normal appearance, Normal sphincter tone. Stool is brown but guaiac positive.   Extremities: Distal radial, " dorsalis pedis, posterior tibial pulses are equal bilaterally; No edema; Nontender calves or saphenous, No cyanosis, No clubbing.   Musculoskeletal: Good range of motion in all major joints. No tenderness to palpation or major deformities noted.   Neurologic: Alert & oriented x 4, clear speech,     DIAGNOSTIC STUDIES    EKG/LABS  Results for orders placed or performed during the hospital encounter of 09/21/24   CBC WITH DIFFERENTIAL   Result Value Ref Range    WBC 8.5 4.8 - 10.8 K/uL    RBC 3.46 (L) 4.20 - 5.40 M/uL    Hemoglobin 11.4 (L) 12.0 - 16.0 g/dL    Hematocrit 34.9 (L) 37.0 - 47.0 %    .9 (H) 81.4 - 97.8 fL    MCH 32.9 27.0 - 33.0 pg    MCHC 32.7 32.2 - 35.5 g/dL    RDW 48.9 35.9 - 50.0 fL    Platelet Count 186 164 - 446 K/uL    MPV 9.9 9.0 - 12.9 fL    Neutrophils-Polys 61.20 44.00 - 72.00 %    Lymphocytes 22.80 22.00 - 41.00 %    Monocytes 13.60 (H) 0.00 - 13.40 %    Eosinophils 1.60 0.00 - 6.90 %    Basophils 0.20 0.00 - 1.80 %    Immature Granulocytes 0.60 0.00 - 0.90 %    Nucleated RBC 0.00 0.00 - 0.20 /100 WBC    Neutrophils (Absolute) 5.20 1.82 - 7.42 K/uL    Lymphs (Absolute) 1.94 1.00 - 4.80 K/uL    Monos (Absolute) 1.16 (H) 0.00 - 0.85 K/uL    Eos (Absolute) 0.14 0.00 - 0.51 K/uL    Baso (Absolute) 0.02 0.00 - 0.12 K/uL    Immature Granulocytes (abs) 0.05 0.00 - 0.11 K/uL    NRBC (Absolute) 0.00 K/uL   COMP METABOLIC PANEL   Result Value Ref Range    Sodium 125 (L) 135 - 145 mmol/L    Potassium 4.6 3.6 - 5.5 mmol/L    Chloride 98 96 - 112 mmol/L    Co2 16 (L) 20 - 33 mmol/L    Anion Gap 11.0 7.0 - 16.0    Glucose 107 (H) 65 - 99 mg/dL    Bun 33 (H) 8 - 22 mg/dL    Creatinine 1.13 0.50 - 1.40 mg/dL    Calcium 8.4 (L) 8.5 - 10.5 mg/dL    Correct Calcium 8.9 8.5 - 10.5 mg/dL    AST(SGOT) 21 12 - 45 U/L    ALT(SGPT) 15 2 - 50 U/L    Alkaline Phosphatase 74 30 - 99 U/L    Total Bilirubin 0.3 0.1 - 1.5 mg/dL    Albumin 3.4 3.2 - 4.9 g/dL    Total Protein 6.3 6.0 - 8.2 g/dL    Globulin 2.9 1.9 -  3.5 g/dL    A-G Ratio 1.2 g/dL   LIPASE   Result Value Ref Range    Lipase 130 (H) 11 - 82 U/L   PROTHROMBIN TIME   Result Value Ref Range    PT 16.7 (H) 12.0 - 14.6 sec    INR 1.35 (H) 0.87 - 1.13   APTT   Result Value Ref Range    APTT 34.7 24.7 - 36.0 sec   PLATELET MAPPING WITH BASIC TEG   Result Value Ref Range    Reaction Time Initial-R 9.7 (H) 4.6 - 9.1 min    React Time Initial Hep 9.5 (H) 4.3 - 8.3 min    Clot Kinetics-K 1.3 0.8 - 2.1 min    Clot Angle-Angle 72.2 63.0 - 78.0 degrees    Maximum Clot Strength-MA 65.0 52.0 - 69.0 mm    TEG Functional Fibrinogen(MA) 28.3 15.0 - 32.0 mm    Lysis 30 minutes-LY30 0.0 0.0 - 2.6 %    % Inhibition ADP 2.1 0.0 - 17.0 %    % Inhibition AA 0.0 0.0 - 11.0 %    TEG Algorithm Link Algorithm    ESTIMATED GFR   Result Value Ref Range    GFR (CKD-EPI) 46 (A) >60 mL/min/1.73 m 2      I have independently interpreted this EKG    RADIOLOGY/PROCEDURES   The attending emergency physician has independently interpreted the diagnostic imaging associated with this visit and am waiting the final reading from the radiologist.   My preliminary interpretation is a follows: ERMD is reviewed the patient's CT scan.  No obvious stranding around the colon or rectum.    Radiologist interpretation:  CTA ABDOMEN PELVIS W & W/O POST PROCESS   Final Result      1.  Negative CT angiogram of the abdomen and pelvis      2.  Aortic atherosclerotic plaque      3.  Mild atelectasis      4.  Prior hysterectomy          COURSE & MEDICAL DECISION MAKING     ASSESSMENT, COURSE AND PLAN  Care Narrative: Patient presents to the ER with complaint of bright red blood per rectum x 4 episodes this morning.  Yesterday she started having an urge to defecate.  Initially she said that she had abdominal pain yesterday but no abdominal pain today.  However, upon further questioning, abdominal pain was more of a urge to defecate yesterday and not really fany pain.  Patient is on 2.5 mg of Eliquis twice daily.  She is  not bleeding from any other areas.  Rectal exam reveals brown stool which is guaiac positive.  There was no bright red blood on my finger and no dried bright red blood around the anus.  There was no blood in the vagina.  Clearly she has guaiac positivity on rectal examination but there is no bright red blood as described by the patient on my examination today.  I was concerned about the possibility of a diverticular bleed and so she did end up going to CT scan for a CTA of the abdomen pelvis.  There is no active extravasation.  No colitis or diverticulitis.  patient's hemoglobin was 14 six days ago.  It is 11 today.  She has nearly a 3 g hemoglobin loss in the last 6 days.  At this time it is presumed that she has some sort of GI bleed, question is where is it coming from.  At this time I think she needs to be hospitalized for serial hemoglobins and possible GI consultation if her hemoglobin continues to trend downward or if she continues to have some bright red blood per rectum.  I spoke with Dr. Fields, hospitalist on-call, and he will kindly evaluate the patient hospitalization.    1:14 PM - Patient seen and examined at bedside. Discussed plan of care, including a diagnostic work up with labs and imaging. Rectal exam performed with nurse as chaperone. Patient agrees to the plan of care.    4:15 PM I discussed the patient's case and the above findings with Dr. Fields (Hospitalist) who agrees to evaluate the patient for hospitalization.     4:31 PM - Patient was reevaluated at bedside. Discussed lab and radiology results with the patient and informed them that CT was reassuring with no active bleeding. I informed patient of the plan for hospitalization for continued monitoring of the rectal bleeding. Patient and daughter were allowed to ask questions and agree to the plan of care.        ADDITIONAL PROBLEM LIST  Problem #1: Urge to defecate since yesterday  Problem #2: Bright red blood x 4 episodes this  morning    DISPOSITION AND DISCUSSIONS  I have discussed management of the patient with the following physicians and WILFREDO's:  Dr. Fields (Hospitalist)     Discussion of management with other [facility] or appropriate source(s): None     Escalation of care considered, and ultimately not performed: No blood transfusion necessary as patient's hemoglobin is 11    Barriers to care at this time, including but not limited to:  None .     Decision tools and prescription drugs considered including, but not limited to: Antibiotics no evidence of diverticulitis or colitis.  No need for antibiotics at this time. .    DISPOSITION:  Patient will be hospitalized by Dr. Fields (Hospitalist) in guarded condition.     FINAL DIANGOSIS  1. Rectal bleeding Acute   2. Elevated lipase Acute     This dictation has been created using voice recognition software. The accuracy of the dictation is limited by the abilities of the software. I expect there may be some errors of grammar and possibly content. I made every attempt to manually correct the errors within my dictation. However, errors related to voice recognition software may still exist and should be interpreted within the appropriate context.      Leonor FRANKLIN (Yariel), am scribing for, and in the presence of, Keena Buckley M.D..    Electronically signed by: Leonor Rust (Yariel), 9/21/2024    Keena FRANKLIN M.D. personally performed the services described in this documentation, as scribed by Leonor Rust in my presence, and it is both accurate and complete.     The note accurately reflects work and decisions made by me.  Keena Buckley M.D.  9/21/2024  8:30 PM

## 2024-09-21 NOTE — ED NOTES
Rounded on patient.  Pt resting comfortably.  Updated to POC, apologized for wait for CT, all needs met.

## 2024-09-21 NOTE — H&P
Hospital Medicine History & Physical Note    Date of Service  9/21/2024    Primary Care Physician  ESA Monte.    Consultants  GI    Code Status  DNAR/DNI    Chief Complaint  Chief Complaint   Patient presents with    Lower GI Bleed     Bright red blood to stool x 4 since this am. No streaks. Daughter reports pt is not bloodless. + dizziness, + weakness. - AB pain. Feels like she needs to poop. + thinners       History of Presenting Illness  Veneracion Reyes Villagracia is a 89 y.o. female who presented 9/21/2024 with atrial fibrillation on Eliquis, hyperlipidemia, hypertension.  Patient was recently admitted for hypoxemia secondary to CHF exacerbation and COVID-pneumonia the patient was discharged.  Prior to her admission, patient was able to ambulate and take care of herself but since then she has been weaker and required more assistance of daily activities.  On September 19, patient followed up with cardiology and patient's metoprolol was held due to her bradycardia.  Her Eliquis was decreased to 2.5 mg p.o. twice daily.    Yesterday, patient was noted to have dull intermittent abdominal pain.  This morning, she endorses weakness, dizziness, or episodes of bright red blood per rectum.  She last took her Eliquis last night.  However, her daughter convinced her to come into the ER for further evaluation.        At [facility], patient found to have normal vital signs.  Hemoglobin 11.4.  CT CT angio negative.  Guaiac positive.  Admitted for GI bleed.    I discussed the plan of care with patient.    Review of Systems  Review of Systems   Constitutional:  Positive for malaise/fatigue.   HENT: Negative.     Eyes: Negative.    Respiratory: Negative.     Cardiovascular: Negative.    Gastrointestinal:  Positive for abdominal pain and blood in stool.   Genitourinary: Negative.    Musculoskeletal: Negative.    Skin: Negative.    Neurological: Negative.    Endo/Heme/Allergies: Negative.    Psychiatric/Behavioral:  Negative.         Past Medical History   has a past medical history of Arthritis, Cataract, Dental disorder, GI bleed, Heart burn, High cholesterol, Hypercholesteremia, Hypertension, and Urinary tract infection (08/2019).    Surgical History   has a past surgical history that includes breast biopsy (Left, 6/14/2018); other; anterior and posterior repair (N/A, 9/3/2019); enterocele repair (N/A, 9/3/2019); bladder sling female (N/A, 9/3/2019); and vaginal suspension (N/A, 9/3/2019).     Family History  family history includes Alcohol abuse in her brother; Breast Cancer in her sister; Stroke in her father.   Family history reviewed with patient. There is no family history that is pertinent to the chief complaint.     Social History   reports that she has never smoked. She has never used smokeless tobacco. She reports that she does not drink alcohol and does not use drugs.    Allergies  Allergies   Allergen Reactions    Penicillins Shortness of Breath and Rash     Difficulty breathing rash    Bloodless        Medications  Prior to Admission Medications   Prescriptions Last Dose Informant Patient Reported? Taking?   Ascorbic Acid (VITAMIN C) 1000 MG Tab UNK at UNK  Yes No   Sig: Take  by mouth.   Finerenone (KERENDIA) 10 MG Tab UNK at UNK  No No   Sig: Take 10 mg by mouth every day.   METOPROLOL TARTRATE 12.5 MG SPLIT TABS (LOPRESSOR) UNK at UNK  Yes Yes   Sig: Take 12.5 mg by mouth 2 times a day.   Melatonin 1 MG Cap UNK at UNK  Yes No   Sig: Take  by mouth.   acetaminophen (TYLENOL) 325 MG Tab UNK at UNK  No No   Sig: Take 2 Tablets by mouth every 6 hours as needed for Moderate Pain.   Patient taking differently: Take 650 mg by mouth every 6 hours as needed for Moderate Pain. 650 mg = 2 tabs   amLODIPine (NORVASC) 5 MG Tab UNK at UNK  No No   Sig: Take 1 Tablet by mouth every day.   apixaban (ELIQUIS) 2.5mg Tab UNK at UNK  No No   Sig: Take 1 Tablet by mouth 2 times a day.   cefpodoxime (VANTIN) 200 MG Tab UNK at UNK   "Yes Yes   Sig: Take 200 mg by mouth 2 times a day. 3 day supply   dexamethasone (DECADRON) 6 MG Tab UNK at UNK  Yes Yes   Sig: Take 6 mg by mouth every day.   guaiFENesin ER (MUCINEX) 600 MG TABLET SR 12 HR UNK at UNK  No No   Sig: Take 1 Tablet by mouth 2 times a day as needed for Cough (productive cough).   losartan (COZAAR) 100 MG Tab UNK at UNK Patient, Family Member No No   Sig: Take 1 Tablet by mouth every day.   lovastatin (MEVACOR) 20 MG Tab UNK at UNK Patient, Family Member No No   Sig: Take 1 Tablet by mouth every day.      Facility-Administered Medications: None       Physical Exam  Temp:  [36.5 °C (97.7 °F)] 36.5 °C (97.7 °F)  Pulse:  [49-74] 74  Resp:  [14-18] 16  BP: (109-136)/(52-61) 136/61  SpO2:  [92 %-95 %] 95 %  Blood Pressure : 136/61   Temperature: 36.5 °C (97.7 °F)   Pulse: 74   Respiration: 16   Pulse Oximetry: 95 %       Physical Exam    Laboratory:  Recent Labs     09/21/24  1214   WBC 8.5   RBC 3.46*   HEMOGLOBIN 11.4*   HEMATOCRIT 34.9*   .9*   MCH 32.9   MCHC 32.7   RDW 48.9   PLATELETCT 186   MPV 9.9     Recent Labs     09/21/24  1214   SODIUM 125*   POTASSIUM 4.6   CHLORIDE 98   CO2 16*   GLUCOSE 107*   BUN 33*   CREATININE 1.13   CALCIUM 8.4*     Recent Labs     09/21/24  1214   ALTSGPT 15   ASTSGOT 21   ALKPHOSPHAT 74   TBILIRUBIN 0.3   LIPASE 130*   GLUCOSE 107*     Recent Labs     09/21/24  1214   APTT 34.7   INR 1.35*     No results for input(s): \"NTPROBNP\" in the last 72 hours.      No results for input(s): \"TROPONINT\" in the last 72 hours.    Imaging:  CTA ABDOMEN PELVIS W & W/O POST PROCESS   Final Result      1.  Negative CT angiogram of the abdomen and pelvis      2.  Aortic atherosclerotic plaque      3.  Mild atelectasis      4.  Prior hysterectomy          no X-Ray or EKG requiring interpretation    Assessment/Plan:  Justification for Admission Status  I anticipate this patient will require at least two midnights for appropriate medical management, necessitating " inpatient admission because patient has hematochezia    Patient will need a Med/Surg bed on MEDICAL service .  The need is secondary to hematochezia.    * Hematochezia- (present on admission)  Assessment & Plan  Noted to have abdominal pain, dizziness, 4 episodes of bright red blood per rectum, last took Eliquis last night  Guaiac positive in ED  Hemoglobin 9.4, check hemoglobin every 6 hours  Protonix  CT CTA abdomen negative  GI consulted, NPO midnight for now.        AF (atrial fibrillation) (HCC)- (present on admission)  Assessment & Plan  Hold eliquis for now   Recently taken off metoprolol due to bradycardia    ACP (advance care planning)  Assessment & Plan  17 minutes spent discussing goals of care with patient and her daughter Ashli, who is POA at bedside. Ashli is also an ICU nurse.  We had discussion about patient's CODE STATUS.  Patient initially states that she was to be full code and everything to be done, but Ashli and I explained that even if ROSC were to be achieved, the chances of weaning her off the ventilator and her having a good quality of life are extremely low due to her age and multiple medical comorbidities.  Patient agrees to be DNR/DNI, okay for medical treatment.    Hypertension  Assessment & Plan  Resume home medications prn    Dyslipidemia- (present on admission)  Assessment & Plan  Mevacor        VTE prophylaxis: pharmacologic prophylaxis contraindicated due to GI bleed

## 2024-09-21 NOTE — CONSULTS
Gastroenterology Initial Consult Note               Author:  Stefany Pederson M.D. Date & Time Created: 9/21/2024 4:42 PM       Patient ID:  Name:             Villagracia , Veneracion Reyes  YOB: 1932  Age:                 89 y.o.  female  MRN:               [id number]      Referring Provider:  Akbar Fields MD        Presenting Chief Complaint:  hematochezia      History of Present Illness:    This is a  89 y.o. female with atrial fibrillation on DOAC, pulmonay hypertension, HFpEF who had 4 episodes of bright red rectal bleeding this morning.  Last dose of Eliquis was 9/20 pm.Dischargd from the hospital 11 days ago for acute respiratory failure due to COViD. Discharge Hgb 13.9.  Today Hgb 11.4.  CTA negative.    Patient speaks Tagalog but daughter at bedside.  No further bleeding today.  No previous colonoscopy.  Does not think her mother has had prior bleeding or change in bowel habits.        Review of Systems:  Review of Systems   Constitutional:  Positive for malaise/fatigue.   HENT: Negative.     Eyes: Negative.    Respiratory: Negative.     Cardiovascular: Negative.    Gastrointestinal:  Positive for blood in stool.   Genitourinary: Negative.    Musculoskeletal: Negative.    Skin: Negative.    Neurological:  Positive for weakness.   Endo/Heme/Allergies: Negative.              Past Medical History:  Past Medical History:   Diagnosis Date    Arthritis     knees    Cataract     jesse iol    Dental disorder     full dentures    GI bleed     in the [facility]    Heart burn     High cholesterol     Hypercholesteremia     Hypertension     pt states controlled on meds    Urinary tract infection 08/2019    currently on antibiotics for uti     Active Hospital Problems    Diagnosis     Hematochezia [K92.1]     AF (atrial fibrillation) ([facility]) [I48.91]     Hypertension [I10]     Dyslipidemia [E78.5]     ACP (advance care planning) [Z71.89]          Past Surgical History:  Past Surgical History:    Procedure Laterality Date    ANTERIOR AND POSTERIOR REPAIR N/A 9/3/2019    Procedure: COLPORRHAPHY, COMBINED ANTEROPOSTERIOR - W/PERINEOPLASTY;  Surgeon: Jim Boykin M.D.;  Location: SURGERY SAME DAY [facility];  Service: Gynecology    ENTEROCELE REPAIR N/A 9/3/2019    Procedure: REPAIR, ENTEROCELE;  Surgeon: Jim Boykin M.D.;  Location: SURGERY SAME DAY [facility];  Service: Gynecology    BLADDER SLING FEMALE N/A 9/3/2019    Procedure: BLADDER SLING, FEMALE - TOT;  Surgeon: Jim Boykin M.D.;  Location: SURGERY SAME DAY [facility];  Service: Gynecology    VAGINAL SUSPENSION N/A 9/3/2019    Procedure: COLPOPEXY - SACROSPINOUS VAULT SUSPENSION;  Surgeon: Jim Boykin M.D.;  Location: SURGERY SAME DAY [facility];  Service: Gynecology    BREAST BIOPSY Left 6/14/2018    Procedure: BREAST BIOPSY - EXCISION MASS;  Surgeon: Kacie Lizama M.D.;  Location: SURGERY [facility];  Service: General    OTHER      [facility]           Hospital Medications:  No current facility-administered medications for this encounter.     Last reviewed on 9/19/2024  1:19 PM by Robbie Vargas Ass't       Current Outpatient Medications:  (Not in a hospital admission)        Medication Allergies:  Allergies   Allergen Reactions    Penicillins Shortness of Breath and Rash     Difficulty breathing rash    Bloodless          Family Medical History:  Family History   Problem Relation Age of Onset    Stroke Father     Breast Cancer Sister     Alcohol abuse Brother          Social History:  Social History     Socioeconomic History    Marital status:      Spouse name: Not on file    Number of children: Not on file    Years of education: Not on file    Highest education level: Not on file   Occupational History    Not on file   Tobacco Use    Smoking status: Never    Smokeless tobacco: Never   Vaping Use    Vaping status: Never Used   Substance and Sexual Activity    Alcohol use: No    Drug use: No     "Sexual activity: Never     Partners: Male   Other Topics Concern    Not on file   Social History Narrative    Not on file     Social Determinants of Health     Financial Resource Strain: Not on file   Food Insecurity: No Food Insecurity (9/5/2024)    Hunger Vital Sign     Worried About Running Out of Food in the Last Year: Never true     Ran Out of Food in the Last Year: Never true   Transportation Needs: No Transportation Needs (9/5/2024)    PRAPARE - Transportation     Lack of Transportation (Medical): No     Lack of Transportation (Non-Medical): No   Physical Activity: Not on file   Stress: Not on file   Social Connections: Not on file   Intimate Partner Violence: Not At Risk (9/5/2024)    Humiliation, Afraid, Rape, and Kick questionnaire     Fear of Current or Ex-Partner: No     Emotionally Abused: No     Physically Abused: No     Sexually Abused: No   Housing Stability: Low Risk  (9/5/2024)    Housing Stability Vital Sign     Unable to Pay for Housing in the Last Year: No     Number of Times Moved in the Last Year: 0     Homeless in the Last Year: No         Vital signs:  Weight/BMI: Body mass index is 30.9 kg/m².  /61   Pulse 74   Temp 36.5 °C (97.7 °F) (Temporal)   Resp 16   Ht 1.499 m (4' 11\")   Wt 69.4 kg (153 lb)   SpO2 95%   Vitals:    09/21/24 1403 09/21/24 1433 09/21/24 1503 09/21/24 1540   BP: 109/54 112/56 121/57 136/61   Pulse: (!) 50 (!) 49 (!) 55 74   Resp: 18 16 14 16   Temp:       TempSrc:       SpO2: 92% 92% 93% 95%   Weight:       Height:         Oxygen Therapy:  Pulse Oximetry: 95 %, O2 (LPM): 0, O2 Delivery Device: None - Room Air  No intake or output data in the 24 hours ending 09/21/24 1642      Physical Exam:  Physical Exam  Constitutional:       Appearance: Normal appearance.   HENT:      Head: Normocephalic and atraumatic.      Nose: Nose normal.      Mouth/Throat:      Mouth: Mucous membranes are moist.   Eyes:      General: No scleral icterus.  Cardiovascular:      Rate and " Rhythm: Normal rate and regular rhythm.      Heart sounds: Normal heart sounds.   Pulmonary:      Effort: Pulmonary effort is normal.      Breath sounds: Normal breath sounds.   Abdominal:      General: Bowel sounds are normal.      Palpations: Abdomen is soft.      Tenderness: There is no abdominal tenderness.   Musculoskeletal:         General: Normal range of motion.      Cervical back: Neck supple.   Skin:     General: Skin is warm and dry.   Neurological:      Mental Status: She is alert.                 Labs:  Recent Labs     09/21/24  1214   SODIUM 125*   POTASSIUM 4.6   CHLORIDE 98   CO2 16*   BUN 33*   CREATININE 1.13   CALCIUM 8.4*     Recent Labs     09/21/24  1214   ALTSGPT 15   ASTSGOT 21   ALKPHOSPHAT 74   TBILIRUBIN 0.3   LIPASE 130*   GLUCOSE 107*     Recent Labs     09/21/24  1214   WBC 8.5   NEUTSPOLYS 61.20   LYMPHOCYTES 22.80   MONOCYTES 13.60*   EOSINOPHILS 1.60   BASOPHILS 0.20   ASTSGOT 21   ALTSGPT 15   ALKPHOSPHAT 74   TBILIRUBIN 0.3     Recent Labs     09/21/24  1214   RBC 3.46*   HEMOGLOBIN 11.4*   HEMATOCRIT 34.9*   PLATELETCT 186   PROTHROMBTM 16.7*   APTT 34.7   INR 1.35*     Recent Results (from the past 24 hour(s))   CBC WITH DIFFERENTIAL    Collection Time: 09/21/24 12:14 PM   Result Value Ref Range    WBC 8.5 4.8 - 10.8 K/uL    RBC 3.46 (L) 4.20 - 5.40 M/uL    Hemoglobin 11.4 (L) 12.0 - 16.0 g/dL    Hematocrit 34.9 (L) 37.0 - 47.0 %    .9 (H) 81.4 - 97.8 fL    MCH 32.9 27.0 - 33.0 pg    MCHC 32.7 32.2 - 35.5 g/dL    RDW 48.9 35.9 - 50.0 fL    Platelet Count 186 164 - 446 K/uL    MPV 9.9 9.0 - 12.9 fL    Neutrophils-Polys 61.20 44.00 - 72.00 %    Lymphocytes 22.80 22.00 - 41.00 %    Monocytes 13.60 (H) 0.00 - 13.40 %    Eosinophils 1.60 0.00 - 6.90 %    Basophils 0.20 0.00 - 1.80 %    Immature Granulocytes 0.60 0.00 - 0.90 %    Nucleated RBC 0.00 0.00 - 0.20 /100 WBC    Neutrophils (Absolute) 5.20 1.82 - 7.42 K/uL    Lymphs (Absolute) 1.94 1.00 - 4.80 K/uL    Monos (Absolute)  1.16 (H) 0.00 - 0.85 K/uL    Eos (Absolute) 0.14 0.00 - 0.51 K/uL    Baso (Absolute) 0.02 0.00 - 0.12 K/uL    Immature Granulocytes (abs) 0.05 0.00 - 0.11 K/uL    NRBC (Absolute) 0.00 K/uL   COMP METABOLIC PANEL    Collection Time: 09/21/24 12:14 PM   Result Value Ref Range    Sodium 125 (L) 135 - 145 mmol/L    Potassium 4.6 3.6 - 5.5 mmol/L    Chloride 98 96 - 112 mmol/L    Co2 16 (L) 20 - 33 mmol/L    Anion Gap 11.0 7.0 - 16.0    Glucose 107 (H) 65 - 99 mg/dL    Bun 33 (H) 8 - 22 mg/dL    Creatinine 1.13 0.50 - 1.40 mg/dL    Calcium 8.4 (L) 8.5 - 10.5 mg/dL    Correct Calcium 8.9 8.5 - 10.5 mg/dL    AST(SGOT) 21 12 - 45 U/L    ALT(SGPT) 15 2 - 50 U/L    Alkaline Phosphatase 74 30 - 99 U/L    Total Bilirubin 0.3 0.1 - 1.5 mg/dL    Albumin 3.4 3.2 - 4.9 g/dL    Total Protein 6.3 6.0 - 8.2 g/dL    Globulin 2.9 1.9 - 3.5 g/dL    A-G Ratio 1.2 g/dL   LIPASE    Collection Time: 09/21/24 12:14 PM   Result Value Ref Range    Lipase 130 (H) 11 - 82 U/L   PROTHROMBIN TIME    Collection Time: 09/21/24 12:14 PM   Result Value Ref Range    PT 16.7 (H) 12.0 - 14.6 sec    INR 1.35 (H) 0.87 - 1.13   APTT    Collection Time: 09/21/24 12:14 PM   Result Value Ref Range    APTT 34.7 24.7 - 36.0 sec   ESTIMATED GFR    Collection Time: 09/21/24 12:14 PM   Result Value Ref Range    GFR (CKD-EPI) 46 (A) >60 mL/min/1.73 m 2   PLATELET MAPPING WITH BASIC TEG    Collection Time: 09/21/24  1:13 PM   Result Value Ref Range    Reaction Time Initial-R 9.7 (H) 4.6 - 9.1 min    React Time Initial Hep 9.5 (H) 4.3 - 8.3 min    Clot Kinetics-K 1.3 0.8 - 2.1 min    Clot Angle-Angle 72.2 63.0 - 78.0 degrees    Maximum Clot Strength-MA 65.0 52.0 - 69.0 mm    TEG Functional Fibrinogen(MA) 28.3 15.0 - 32.0 mm    Lysis 30 minutes-LY30 0.0 0.0 - 2.6 %    % Inhibition ADP 2.1 0.0 - 17.0 %    % Inhibition AA 0.0 0.0 - 11.0 %    TEG Algorithm Link Algorithm          Radiology Review:  CTA ABDOMEN PELVIS W & W/O POST PROCESS   Final Result      1.  Negative CT  angiogram of the abdomen and pelvis      2.  Aortic atherosclerotic plaque      3.  Mild atelectasis      4.  Prior hysterectomy            MDM (Data Review):   -Records reviewed and summarized in current documentation  -I personally reviewed and interpreted the laboratory results  -I personally reviewed the radiology images    Assessment/Recommendations:    Impression:   Painless hematochezia, low volume   Mild anemia   Hyponatremia   Anticoagulation   Atrial fibrillation    Recs:  Daughter and I discussed several options:   Golytely prep and colonoscopy   Mag citrate + 2 enemas and flex sig (?hemorrhoids or distal polyp/neoplasm)   Monitor for bleeding, check labs in am and formulate plan tomorrow  Patient would like to wait and see what happens.    Daughter is an ICU RN and states her mother would not do anything if she did have cancer          Stefany Pederson M.D.          Core Quality Measures   Reviewed items:  Labs, Medications and Radiology reports reviewed

## 2024-09-22 LAB
ANION GAP SERPL CALC-SCNC: 11 MMOL/L (ref 7–16)
BUN SERPL-MCNC: 24 MG/DL (ref 8–22)
CALCIUM SERPL-MCNC: 8.2 MG/DL (ref 8.5–10.5)
CHLORIDE SERPL-SCNC: 101 MMOL/L (ref 96–112)
CO2 SERPL-SCNC: 17 MMOL/L (ref 20–33)
CREAT SERPL-MCNC: 0.98 MG/DL (ref 0.5–1.4)
GFR SERPLBLD CREATININE-BSD FMLA CKD-EPI: 54 ML/MIN/1.73 M 2
GLUCOSE SERPL-MCNC: 101 MG/DL (ref 65–99)
HCT VFR BLD AUTO: 30.6 % (ref 37–47)
HCT VFR BLD AUTO: 31.4 % (ref 37–47)
HCT VFR BLD AUTO: 32.6 % (ref 37–47)
HCT VFR BLD AUTO: 33 % (ref 37–47)
HGB BLD-MCNC: 10.3 G/DL (ref 12–16)
HGB BLD-MCNC: 10.4 G/DL (ref 12–16)
HGB BLD-MCNC: 10.8 G/DL (ref 12–16)
HGB BLD-MCNC: 11.1 G/DL (ref 12–16)
POTASSIUM SERPL-SCNC: 4.6 MMOL/L (ref 3.6–5.5)
SODIUM SERPL-SCNC: 129 MMOL/L (ref 135–145)

## 2024-09-22 PROCEDURE — 700105 HCHG RX REV CODE 258: Performed by: STUDENT IN AN ORGANIZED HEALTH CARE EDUCATION/TRAINING PROGRAM

## 2024-09-22 PROCEDURE — 85014 HEMATOCRIT: CPT

## 2024-09-22 PROCEDURE — 99232 SBSQ HOSP IP/OBS MODERATE 35: CPT | Performed by: NURSE PRACTITIONER

## 2024-09-22 PROCEDURE — 85018 HEMOGLOBIN: CPT | Mod: 91

## 2024-09-22 PROCEDURE — 700102 HCHG RX REV CODE 250 W/ 637 OVERRIDE(OP): Performed by: STUDENT IN AN ORGANIZED HEALTH CARE EDUCATION/TRAINING PROGRAM

## 2024-09-22 PROCEDURE — 700102 HCHG RX REV CODE 250 W/ 637 OVERRIDE(OP)

## 2024-09-22 PROCEDURE — 770006 HCHG ROOM/CARE - MED/SURG/GYN SEMI*

## 2024-09-22 PROCEDURE — A9270 NON-COVERED ITEM OR SERVICE: HCPCS | Performed by: STUDENT IN AN ORGANIZED HEALTH CARE EDUCATION/TRAINING PROGRAM

## 2024-09-22 PROCEDURE — 700101 HCHG RX REV CODE 250: Performed by: NURSE PRACTITIONER

## 2024-09-22 PROCEDURE — 99232 SBSQ HOSP IP/OBS MODERATE 35: CPT | Performed by: STUDENT IN AN ORGANIZED HEALTH CARE EDUCATION/TRAINING PROGRAM

## 2024-09-22 PROCEDURE — A9270 NON-COVERED ITEM OR SERVICE: HCPCS

## 2024-09-22 PROCEDURE — 700111 HCHG RX REV CODE 636 W/ 250 OVERRIDE (IP): Performed by: STUDENT IN AN ORGANIZED HEALTH CARE EDUCATION/TRAINING PROGRAM

## 2024-09-22 RX ADMIN — LOSARTAN POTASSIUM 100 MG: 50 TABLET, FILM COATED ORAL at 05:43

## 2024-09-22 RX ADMIN — PANTOPRAZOLE SODIUM 40 MG: 40 INJECTION, POWDER, FOR SOLUTION INTRAVENOUS at 18:04

## 2024-09-22 RX ADMIN — PANTOPRAZOLE SODIUM 40 MG: 40 INJECTION, POWDER, FOR SOLUTION INTRAVENOUS at 05:44

## 2024-09-22 RX ADMIN — LOVASTATIN 20 MG: 20 TABLET ORAL at 18:04

## 2024-09-22 RX ADMIN — Medication 5 MG: at 22:47

## 2024-09-22 RX ADMIN — AMLODIPINE BESYLATE 5 MG: 5 TABLET ORAL at 05:43

## 2024-09-22 RX ADMIN — SODIUM CHLORIDE, POTASSIUM CHLORIDE, SODIUM LACTATE AND CALCIUM CHLORIDE: 600; 310; 30; 20 INJECTION, SOLUTION INTRAVENOUS at 00:12

## 2024-09-22 RX ADMIN — POLYETHYLENE GLYCOL-3350 AND ELECTROLYTES 4 L: 236; 6.74; 5.86; 2.97; 22.74 POWDER, FOR SOLUTION ORAL at 18:04

## 2024-09-22 ASSESSMENT — ENCOUNTER SYMPTOMS
BLURRED VISION: 0
CONSTIPATION: 0
HEADACHES: 0
DIARRHEA: 0
EYE DISCHARGE: 0
INSOMNIA: 0
SHORTNESS OF BREATH: 0
FALLS: 0
MYALGIAS: 0
ABDOMINAL PAIN: 0
NAUSEA: 0
VOMITING: 0
SORE THROAT: 0
SINUS PAIN: 0
BLOOD IN STOOL: 1
FEVER: 0
NERVOUS/ANXIOUS: 0
FLANK PAIN: 0
WEAKNESS: 1
COUGH: 0

## 2024-09-22 ASSESSMENT — PAIN DESCRIPTION - PAIN TYPE
TYPE: ACUTE PAIN

## 2024-09-22 NOTE — CARE PLAN
The patient is Stable - Low risk of patient condition declining or worsening    Shift Goals  Clinical Goals: Pt will have GI consult and trend H/H.  Patient Goals: Pt wants to rest and be updated with POC  Family Goals: Update POC    Progress made toward(s) clinical / shift goals:      Pt plan will be GI consult and continue to trend H/H. All needs met during shift and all questions answered.       Problem: Knowledge Deficit - Standard  Goal: Patient and family/care givers will demonstrate understanding of plan of care, disease process/condition, diagnostic tests and medications  Outcome: Progressing  Note: Pt updated with POC     Problem: Fall Risk  Goal: Patient will remain free from falls  Outcome: Progressing  Note: Pt remained free from falls     Problem: Risk for Fluid Volume Deficit Related to Bleeding  Goal: Patient will show no signs and symptoms of excessive bleeding  Outcome: Progressing       Patient is not progressing towards the following goals:

## 2024-09-22 NOTE — PROGRESS NOTES
Gastroenterology Progress Note               Author:  ISMA Bowens Date & Time Created: 9/22/2024 1:56 PM       Patient ID:  Name:             Villagracia , Veneracion Reyes  YOB: 1932  Age:                 89 y.o.  female  MRN:               [id number]        Medical Decision Making, by Problem:  Active Hospital Problems    Diagnosis     Hematochezia [K92.1]     AF (atrial fibrillation) ([facility]) [I48.91]     Hypertension [I10]     Dyslipidemia [E78.5]     ACP (advance care planning) [Z71.89]            Presenting Chief Complaint:  hematochezia        History of Present Illness:    This is a  89 y.o. female with atrial fibrillation on DOAC, pulmonay hypertension, HFpEF who had 4 episodes of bright red rectal bleeding this morning.  Last dose of Eliquis was 9/20 pm.Dischargd from the hospital 11 days ago for acute respiratory failure due to COViD. Discharge Hgb 13.9.  Today Hgb 11.4.  CTA negative.     Patient speaks Tagalog but daughter at bedside.  No further bleeding today.  No previous colonoscopy.  Does not think her mother has had prior bleeding or change in bowel habits.           Interval History:  9/22/2024: Patient seen at bedside with daughter.  Daughter served as  per their request.  Patient awake, alert, oriented.  Requesting diet- Patient okay to have clear liquids.  Discussed with daughter which direction she and patient would like to pursue in terms of endoscopy versus monitoring.  She states that they worry about rebleeding should patient continue her Eliquis.  Acknowledged/validated concerns.  Discussed that the best evaluation would be GoLytely prep tonight with colonoscopy tomorrow.  Daughter states that if patient had an worst-case scenario a bleeding termer, they would not want to pursue treatment.  However, they would at least know that they would need to hold anticoagulation indefinitely.  Patient is agreeable to colon prep and scope tomorrow.  Discussed  options for rectal tube versus bedside commode.  They elected for bedside commode.  Hemoglobin 10.3 this morning.  Sodium 129.        Hospital Medications:  Current Facility-Administered Medications   Medication Dose Frequency Provider Last Rate Last Admin    amLODIPine (Norvasc) tablet 5 mg  5 mg DAILY Akbar Fields M.D.   5 mg at 09/22/24 0543    losartan (Cozaar) tablet 100 mg  100 mg DAILY Akbar Fields M.D.   100 mg at 09/22/24 0543    lovastatin (Mevacor) tablet 20 mg  20 mg Q EVENING Akbar Fields M.D.   20 mg at 09/21/24 1832    acetaminophen (Tylenol) tablet 650 mg  650 mg Q6HRS PRN Akbar Fields M.D.        ondansetron (Zofran) syringe/vial injection 4 mg  4 mg Q4HRS PRN Akbar Fields M.D.        ondansetron (Zofran ODT) dispertab 4 mg  4 mg Q4HRS PRN Akbar Fields M.D.        labetalol (Normodyne/Trandate) injection 10 mg  10 mg Q4HRS PRN Akbar Fields M.D.        pantoprazole (Protonix) injection 40 mg  40 mg BID Akbar Fields M.D.   40 mg at 09/22/24 0544    hydrOXYzine HCl (Atarax) tablet 25 mg  25 mg TID PRN Blanca Carcamo A.P.R.NFidencio        melatonin tablet 5 mg  5 mg Nightly Blanca Carcamo A.P.R.N.   5 mg at 09/21/24 2107   Last reviewed on 9/21/2024  5:17 PM by Haily Lazo, Pharmacy Intern       Review of Systems:  Review of Systems   Constitutional:  Positive for malaise/fatigue. Negative for fever.   HENT:  Positive for hearing loss. Negative for sinus pain and sore throat.    Eyes:  Negative for blurred vision and discharge.   Respiratory:  Negative for cough and shortness of breath.    Cardiovascular:  Negative for chest pain and leg swelling.   Gastrointestinal:  Positive for blood in stool. Negative for abdominal pain, constipation, diarrhea, melena, nausea and vomiting.   Genitourinary:  Negative for dysuria and flank pain.   Musculoskeletal:  Negative for falls and myalgias.   Neurological:  Positive for weakness. Negative for headaches.  "  Psychiatric/Behavioral:  The patient is not nervous/anxious and does not have insomnia.    All other systems reviewed and are negative.        Vital signs:  Weight/BMI: Body mass index is 30.9 kg/m².  BP (!) 145/55   Pulse (!) 54   Temp 36.2 °C (97.1 °F) (Tympanic)   Resp 17   Ht 1.499 m (4' 11\")   Wt 69.4 kg (153 lb)   SpO2 97%   Vitals:    09/21/24 2144 09/22/24 0411 09/22/24 0414 09/22/24 0722   BP: (!) 157/55 (!) 149/67 (!) 150/58 (!) 145/55   Pulse: 66 60  (!) 54   Resp: 16 18  17   Temp: 36.2 °C (97.1 °F) 36.1 °C (97 °F)  36.2 °C (97.1 °F)   TempSrc: Temporal Temporal  Tympanic   SpO2: 92% 97%     Weight:       Height:         Oxygen Therapy:  Pulse Oximetry: 97 %, O2 (LPM): 0, O2 Delivery Device: None - Room Air  No intake or output data in the 24 hours ending 09/22/24 1356      Physical Exam:  Physical Exam  Vitals and nursing note reviewed.   Constitutional:       General: She is awake. She is not in acute distress.     Appearance: Normal appearance. She is well-developed and well-groomed. She is obese. She is not toxic-appearing.   HENT:      Head: Normocephalic.      Nose: Nose normal. No congestion.      Mouth/Throat:      Mouth: Mucous membranes are moist.      Pharynx: Oropharynx is clear. No oropharyngeal exudate.   Eyes:      Extraocular Movements: Extraocular movements intact.      Conjunctiva/sclera: Conjunctivae normal.   Cardiovascular:      Rate and Rhythm: Normal rate. Rhythm irregular.      Pulses: Normal pulses.   Pulmonary:      Effort: Pulmonary effort is normal.      Breath sounds: Normal breath sounds.   Abdominal:      General: Abdomen is flat. Bowel sounds are normal. There is no distension.      Palpations: Abdomen is soft.      Tenderness: There is no abdominal tenderness. There is no guarding.   Musculoskeletal:      Right lower leg: No edema.      Left lower leg: No edema.   Skin:     General: Skin is warm and dry.      Capillary Refill: Capillary refill takes less than 2 " seconds.      Coloration: Skin is not jaundiced or pale.   Neurological:      General: No focal deficit present.      Mental Status: She is alert and oriented to person, place, and time. Mental status is at baseline.   Psychiatric:         Mood and Affect: Mood normal.         Behavior: Behavior normal. Behavior is cooperative.             Labs:  Recent Labs     09/21/24 1214 09/21/24  2346   SODIUM 125* 129*   POTASSIUM 4.6 4.6   CHLORIDE 98 101   CO2 16* 17*   BUN 33* 24*   CREATININE 1.13 0.98   CALCIUM 8.4* 8.2*     Recent Labs     09/21/24  1214 09/21/24  2346   ALTSGPT 15  --    ASTSGOT 21  --    ALKPHOSPHAT 74  --    TBILIRUBIN 0.3  --    LIPASE 130*  --    GLUCOSE 107* 101*     Recent Labs     09/21/24 1214   WBC 8.5   NEUTSPOLYS 61.20   LYMPHOCYTES 22.80   MONOCYTES 13.60*   EOSINOPHILS 1.60   BASOPHILS 0.20   ASTSGOT 21   ALTSGPT 15   ALKPHOSPHAT 74   TBILIRUBIN 0.3     Recent Labs     09/21/24  1214 09/21/24  2346 09/22/24  0648 09/22/24  1220   RBC 3.46*  --   --   --    HEMOGLOBIN 11.4* 10.8* 10.4* 10.3*   HEMATOCRIT 34.9* 32.6* 31.4* 30.6*   PLATELETCT 186  --   --   --    PROTHROMBTM 16.7*  --   --   --    APTT 34.7  --   --   --    INR 1.35*  --   --   --      Recent Results (from the past 24 hour(s))   HEMOGLOBIN AND HEMATOCRIT    Collection Time: 09/21/24 11:46 PM   Result Value Ref Range    Hemoglobin 10.8 (L) 12.0 - 16.0 g/dL    Hematocrit 32.6 (L) 37.0 - 47.0 %   Basic Metabolic Panel (BMP)    Collection Time: 09/21/24 11:46 PM   Result Value Ref Range    Sodium 129 (L) 135 - 145 mmol/L    Potassium 4.6 3.6 - 5.5 mmol/L    Chloride 101 96 - 112 mmol/L    Co2 17 (L) 20 - 33 mmol/L    Glucose 101 (H) 65 - 99 mg/dL    Bun 24 (H) 8 - 22 mg/dL    Creatinine 0.98 0.50 - 1.40 mg/dL    Calcium 8.2 (L) 8.5 - 10.5 mg/dL    Anion Gap 11.0 7.0 - 16.0   ESTIMATED GFR    Collection Time: 09/21/24 11:46 PM   Result Value Ref Range    GFR (CKD-EPI) 54 (A) >60 mL/min/1.73 m 2   HEMOGLOBIN AND HEMATOCRIT     Collection Time: 09/22/24  6:48 AM   Result Value Ref Range    Hemoglobin 10.4 (L) 12.0 - 16.0 g/dL    Hematocrit 31.4 (L) 37.0 - 47.0 %   HEMOGLOBIN AND HEMATOCRIT    Collection Time: 09/22/24 12:20 PM   Result Value Ref Range    Hemoglobin 10.3 (L) 12.0 - 16.0 g/dL    Hematocrit 30.6 (L) 37.0 - 47.0 %       Radiology Review:  CTA ABDOMEN PELVIS W & W/O POST PROCESS   Final Result      1.  Negative CT angiogram of the abdomen and pelvis      2.  Aortic atherosclerotic plaque      3.  Mild atelectasis      4.  Prior hysterectomy            MDM (Data Review):   -Records reviewed and summarized in current documentation  -I personally reviewed and interpreted the laboratory results  -I personally reviewed the radiology images    Assessment/Recommendations:  Painless hematochezia, low volume  Anemia  Hyponatremia  Atrial fibrillation-on Eliquis.  Last Eliquis dose 9/20/2024 in the evening    Plan:  Monitor H/H and for signs of rebleeding  Morning CBC, BMP  Optimize Na.   Clear liquid diet (no reds); NPO at MN  Plan for colonoscopy tomorrow.     Discussed with patient and daughter at bedside, Dr. Cain, Dr. Valadez        Core Quality Measures   Reviewed items::  Labs, Medications and Radiology reports reviewed

## 2024-09-22 NOTE — PROGRESS NOTES
Received report and assumed care of patient at change of shift. Patient is A&Ox4, on RA, and reports no pain at this time. Patient assessment completed, bed in lowest position, and call light and personal belongings are within reach. Patient expressed no further needs at this time.

## 2024-09-22 NOTE — PROGRESS NOTES
Hospital Medicine Daily Progress Note    Date of Service  9/22/2024    Chief Complaint  Veneracion Reyes Villagracia is a 89 y.o. female admitted 9/21/2024 with bright red blood per rectum.    Hospital Course  Roberto Lee is a 89-year-old female with PMHx atrial fibrillation on Eliquis, HLD, HTN.  Admitted 9/21 for abdominal pain, weakness and bright red blood per rectum.    Patient was recently admitted from 9/5 through 9/10 for COVID-pneumonia with associated hypoxic respiratory failure and CHF exacerbation.  Oxygen was titrated off during that hospitalization and patient discharged home with close outpatient follow-up.  She was seen by cardiology 9/19.  At that time patient was noted to have bradycardia and metoprolol was discontinued.  Eliquis dosing was reduced to 2.5 mg twice daily due to concern for bleeding.    Patient now presents with bright red blood per rectum.  CTA A/P: Negative for acute processes.  Gastroenterology was consulted from the emergency room.  Recommending colonoscopy.    Interval Problem Update  9/22: Vitals notable for SBP ranging 123 through 157.  Patient remains on room air.  Hb 10.8 from 11.4.  Sodium 129 from 125.  Creatinine 0.98.  Patient is n.p.o. for colonoscopy today.    I have discussed this patient's plan of care and discharge plan at IDT rounds today with Case Management, Nursing, Nursing leadership, and other members of the IDT team.    Consultants/Specialty  GI    Code Status  DNAR/DNI    Disposition  The patient is not medically cleared for discharge to home or a post-acute facility.      I have placed the appropriate orders for post-discharge needs.    Review of Systems  Review of Systems   Constitutional:  Negative for fever and malaise/fatigue.   Respiratory:  Negative for shortness of breath.    Cardiovascular:  Negative for chest pain and leg swelling.   Gastrointestinal:  Negative for abdominal pain, nausea and vomiting.        Physical Exam  Temp:  [36.1  °C (97 °F)-36.4 °C (97.5 °F)] 36.2 °C (97.1 °F)  Pulse:  [49-74] 54  Resp:  [14-18] 17  BP: (109-157)/(49-67) 145/55  SpO2:  [92 %-98 %] 97 %    Physical Exam  Vitals and nursing note reviewed.   Constitutional:       General: She is not in acute distress.     Appearance: Normal appearance. She is not ill-appearing.   Cardiovascular:      Rate and Rhythm: Bradycardia present. Rhythm irregular.      Heart sounds: Murmur heard.   Pulmonary:      Effort: Pulmonary effort is normal.      Breath sounds: Normal breath sounds.   Abdominal:      General: Abdomen is flat. Bowel sounds are normal.      Palpations: Abdomen is soft.   Skin:     General: Skin is warm and dry.      Coloration: Skin is pale.   Neurological:      Mental Status: She is alert and oriented to person, place, and time.   Psychiatric:         Mood and Affect: Mood normal.         Behavior: Behavior normal.         Fluids  No intake or output data in the 24 hours ending 09/22/24 1314     Laboratory  Recent Labs     09/21/24  1214 09/21/24  2346 09/22/24  0648 09/22/24  1220   WBC 8.5  --   --   --    RBC 3.46*  --   --   --    HEMOGLOBIN 11.4* 10.8* 10.4* 10.3*   HEMATOCRIT 34.9* 32.6* 31.4* 30.6*   .9*  --   --   --    MCH 32.9  --   --   --    MCHC 32.7  --   --   --    RDW 48.9  --   --   --    PLATELETCT 186  --   --   --    MPV 9.9  --   --   --      Recent Labs     09/21/24  1214 09/21/24  2346   SODIUM 125* 129*   POTASSIUM 4.6 4.6   CHLORIDE 98 101   CO2 16* 17*   GLUCOSE 107* 101*   BUN 33* 24*   CREATININE 1.13 0.98   CALCIUM 8.4* 8.2*     Recent Labs     09/21/24  1214   APTT 34.7   INR 1.35*               Imaging  CTA ABDOMEN PELVIS W & W/O POST PROCESS   Final Result      1.  Negative CT angiogram of the abdomen and pelvis      2.  Aortic atherosclerotic plaque      3.  Mild atelectasis      4.  Prior hysterectomy           Assessment/Plan  * Hematochezia- (present on admission)  Assessment & Plan  Associated with abdominal pain.  4  episodes of bright red blood per rectum.  Hb 11.4 on admission; baseline 13-14.  Down to 10.3 this afternoon.  No additional episodes of BRBPR  - Gastroenterology is following; discussed with EVERARDO Parker  - Recommending colonoscopy and possible EGD for further evaluation  - Continue n.p.o. status at this time      AF (atrial fibrillation) (HCC)- (present on admission)  Assessment & Plan  Followed closely by cardiology  Recently discontinued metoprolol due to bradycardia  Holding apixaban due to GI bleed    ACP (advance care planning)  Assessment & Plan  I had a long conversation with patient and her daughter Ashli at bedside.  We discussed that without knowing if bleeding will persist it will be difficult to resume anticoagulation.  They are agreeable to proceeding forward with scoping to determine etiology of bleeding.  This will allow them to make a decision about restarting anticoagulation moving forward.  Of course, given her atrial fibrillation-patient is at risk for stroke without Eliquis on board.  Discussed with EVERARDO Parker-gastroenterology.  ACP time spent 19 minutes.    Hypertension  Assessment & Plan  Continue home losartan, amlodipine    Dyslipidemia- (present on admission)  Assessment & Plan  Continue home lovastatin         VTE prophylaxis:   SCDs/TEDs   pharmacologic prophylaxis contraindicated due to GI bleed      I have performed a physical exam and reviewed and updated ROS and Plan today (9/22/2024). In review of yesterday's note (9/21/2024), there are no changes except as documented above.

## 2024-09-22 NOTE — ED NOTES
Medication Reconciliation    Medication reconciliation is complete per patient's daughter at bedside .  - Allergies reviewed.  - Patient finished course of cefpodoxime. Last dose taken on 9/13/2024.  - Patient is taking apixaban. Last dose taken on 9/20/2024 PM.  - Patient's home pharmacy is [facility] in [place].    Haily Lazo, Pharmacy Intern

## 2024-09-22 NOTE — PROGRESS NOTES
4 Eyes Skin Assessment Completed by ALANNA Vidales and ALANNA Dean.    Head WDL  Ears WDL  Nose WDL  Mouth WDL  Neck WDL  Breast/Chest WDL  Shoulder Blades WDL  Spine WDL  (R) Arm/Elbow/Hand Bruising Upper arm and forearm  (L) Arm/Elbow/Hand Bruising  Abdomen WDL  Groin WDL  Scrotum/Coccyx/Buttocks WDL  (R) Leg WDL  (L) Leg WDL  (R) Heel/Foot/Toe WDL  (L) Heel/Foot/Toe WDL          Devices In Places NA      Interventions In Place N/A    Possible Skin Injury No    Pictures Uploaded Into Epic N/A  Wound Consult Placed N/A  RN Wound Prevention Protocol Ordered No

## 2024-09-22 NOTE — HOSPITAL COURSE
Roberto Lee is a 89-year-old female with PMHx atrial fibrillation on Eliquis, HLD, HTN.  Admitted 9/21 for abdominal pain, weakness and bright red blood per rectum.    Patient was recently admitted from 9/5 through 9/10 for COVID-pneumonia with associated hypoxic respiratory failure and CHF exacerbation.  Oxygen was titrated off during that hospitalization and patient discharged home with close outpatient follow-up.  She was seen by cardiology 9/19.  At that time patient was noted to have bradycardia and metoprolol was discontinued.  Eliquis dosing was reduced to 2.5 mg twice daily due to concern for bleeding.    Patient now presents with bright red blood per rectum.  CTA A/P: Negative for acute processes.  Gastroenterology was consulted from the emergency room.  Recommending colonoscopy.    Colonoscopy 9/23 showing mild pandiverticulosis and internal hemorrhoids.  Started patient on Anusol suppositories for management of hemorrhoids.  Recommend trialing Eliquis 2.5 mg twice daily for atrial fibrillation.  Patient has an upcoming lab check and appointment with primary care physician.  If patient continues to have anemia and rectal bleeding from internal hemorrhoids-there may need to be a conversation about discontinuing Eliquis moving forward.  I had a long discussion with patient's daughter at bedside regarding the risks and benefits of continuing anticoagulation at this time.  At this point we will proceed forward with resumption of Eliquis 2.5 mg twice daily.  ACP time spent 18 minutes.

## 2024-09-23 ENCOUNTER — APPOINTMENT (OUTPATIENT)
Dept: MEDICAL GROUP | Facility: LAB | Age: 89
End: 2024-09-23
Attending: STUDENT IN AN ORGANIZED HEALTH CARE EDUCATION/TRAINING PROGRAM
Payer: MEDICARE

## 2024-09-23 ENCOUNTER — ANESTHESIA EVENT (OUTPATIENT)
Dept: SURGERY | Facility: MEDICAL CENTER | Age: 89
DRG: 394 | End: 2024-09-23
Payer: MEDICARE

## 2024-09-23 ENCOUNTER — ANESTHESIA (OUTPATIENT)
Dept: SURGERY | Facility: MEDICAL CENTER | Age: 89
DRG: 394 | End: 2024-09-23
Payer: MEDICARE

## 2024-09-23 ENCOUNTER — PHARMACY VISIT (OUTPATIENT)
Dept: PHARMACY | Facility: MEDICAL CENTER | Age: 89
End: 2024-09-23
Payer: COMMERCIAL

## 2024-09-23 VITALS
DIASTOLIC BLOOD PRESSURE: 48 MMHG | OXYGEN SATURATION: 94 % | RESPIRATION RATE: 18 BRPM | HEART RATE: 55 BPM | WEIGHT: 153 LBS | BODY MASS INDEX: 30.84 KG/M2 | SYSTOLIC BLOOD PRESSURE: 118 MMHG | HEIGHT: 59 IN | TEMPERATURE: 97.4 F

## 2024-09-23 LAB
ABO + RH BLD: NORMAL
ABO GROUP BLD: NORMAL
ANION GAP SERPL CALC-SCNC: 11 MMOL/L (ref 7–16)
BLD GP AB SCN SERPL QL: NORMAL
BUN SERPL-MCNC: 13 MG/DL (ref 8–22)
CALCIUM SERPL-MCNC: 8.7 MG/DL (ref 8.5–10.5)
CHLORIDE SERPL-SCNC: 103 MMOL/L (ref 96–112)
CO2 SERPL-SCNC: 21 MMOL/L (ref 20–33)
CREAT SERPL-MCNC: 0.96 MG/DL (ref 0.5–1.4)
ERYTHROCYTE [DISTWIDTH] IN BLOOD BY AUTOMATED COUNT: 48.4 FL (ref 35.9–50)
GFR SERPLBLD CREATININE-BSD FMLA CKD-EPI: 55 ML/MIN/1.73 M 2
GLUCOSE SERPL-MCNC: 98 MG/DL (ref 65–99)
HCT VFR BLD AUTO: 31.9 % (ref 37–47)
HGB BLD-MCNC: 10.6 G/DL (ref 12–16)
MCH RBC QN AUTO: 33.1 PG (ref 27–33)
MCHC RBC AUTO-ENTMCNC: 33.2 G/DL (ref 32.2–35.5)
MCV RBC AUTO: 99.7 FL (ref 81.4–97.8)
PLATELET # BLD AUTO: 159 K/UL (ref 164–446)
PMV BLD AUTO: 10.2 FL (ref 9–12.9)
POTASSIUM SERPL-SCNC: 4.4 MMOL/L (ref 3.6–5.5)
RBC # BLD AUTO: 3.2 M/UL (ref 4.2–5.4)
RH BLD: NORMAL
SODIUM SERPL-SCNC: 135 MMOL/L (ref 135–145)
WBC # BLD AUTO: 8.9 K/UL (ref 4.8–10.8)

## 2024-09-23 PROCEDURE — 160009 HCHG ANES TIME/MIN: Performed by: INTERNAL MEDICINE

## 2024-09-23 PROCEDURE — 45378 DIAGNOSTIC COLONOSCOPY: CPT | Performed by: INTERNAL MEDICINE

## 2024-09-23 PROCEDURE — 700101 HCHG RX REV CODE 250: Performed by: ANESTHESIOLOGY

## 2024-09-23 PROCEDURE — 700111 HCHG RX REV CODE 636 W/ 250 OVERRIDE (IP): Performed by: ANESTHESIOLOGY

## 2024-09-23 PROCEDURE — RXMED WILLOW AMBULATORY MEDICATION CHARGE: Performed by: STUDENT IN AN ORGANIZED HEALTH CARE EDUCATION/TRAINING PROGRAM

## 2024-09-23 PROCEDURE — 160201 HCHG ENDO MINUTES - 1ST 30 MINS LEVEL 2: Performed by: INTERNAL MEDICINE

## 2024-09-23 PROCEDURE — 700102 HCHG RX REV CODE 250 W/ 637 OVERRIDE(OP): Performed by: STUDENT IN AN ORGANIZED HEALTH CARE EDUCATION/TRAINING PROGRAM

## 2024-09-23 PROCEDURE — 700111 HCHG RX REV CODE 636 W/ 250 OVERRIDE (IP): Performed by: STUDENT IN AN ORGANIZED HEALTH CARE EDUCATION/TRAINING PROGRAM

## 2024-09-23 PROCEDURE — 700105 HCHG RX REV CODE 258: Performed by: ANESTHESIOLOGY

## 2024-09-23 PROCEDURE — 160035 HCHG PACU - 1ST 60 MINS PHASE I: Performed by: INTERNAL MEDICINE

## 2024-09-23 PROCEDURE — 86900 BLOOD TYPING SEROLOGIC ABO: CPT

## 2024-09-23 PROCEDURE — 160048 HCHG OR STATISTICAL LEVEL 1-5: Performed by: INTERNAL MEDICINE

## 2024-09-23 PROCEDURE — 99239 HOSP IP/OBS DSCHRG MGMT >30: CPT | Performed by: STUDENT IN AN ORGANIZED HEALTH CARE EDUCATION/TRAINING PROGRAM

## 2024-09-23 PROCEDURE — 97165 OT EVAL LOW COMPLEX 30 MIN: CPT

## 2024-09-23 PROCEDURE — 160002 HCHG RECOVERY MINUTES (STAT): Performed by: INTERNAL MEDICINE

## 2024-09-23 PROCEDURE — 80048 BASIC METABOLIC PNL TOTAL CA: CPT

## 2024-09-23 PROCEDURE — 0DJD8ZZ INSPECTION OF LOWER INTESTINAL TRACT, VIA NATURAL OR ARTIFICIAL OPENING ENDOSCOPIC: ICD-10-PCS | Performed by: INTERNAL MEDICINE

## 2024-09-23 PROCEDURE — 86901 BLOOD TYPING SEROLOGIC RH(D): CPT

## 2024-09-23 PROCEDURE — 85027 COMPLETE CBC AUTOMATED: CPT

## 2024-09-23 PROCEDURE — A9270 NON-COVERED ITEM OR SERVICE: HCPCS | Performed by: STUDENT IN AN ORGANIZED HEALTH CARE EDUCATION/TRAINING PROGRAM

## 2024-09-23 PROCEDURE — 97535 SELF CARE MNGMENT TRAINING: CPT

## 2024-09-23 RX ORDER — HYDROCORTISONE ACETATE 25 MG/1
25 SUPPOSITORY RECTAL EVERY 12 HOURS
Qty: 12 SUPPOSITORY | Refills: 1 | Status: SHIPPED | OUTPATIENT
Start: 2024-09-23

## 2024-09-23 RX ORDER — DIPHENHYDRAMINE HYDROCHLORIDE 50 MG/ML
12.5 INJECTION INTRAMUSCULAR; INTRAVENOUS
Status: DISCONTINUED | OUTPATIENT
Start: 2024-09-23 | End: 2024-09-23 | Stop reason: HOSPADM

## 2024-09-23 RX ORDER — HALOPERIDOL 5 MG/ML
1 INJECTION INTRAMUSCULAR
Status: DISCONTINUED | OUTPATIENT
Start: 2024-09-23 | End: 2024-09-23 | Stop reason: HOSPADM

## 2024-09-23 RX ORDER — ONDANSETRON 2 MG/ML
4 INJECTION INTRAMUSCULAR; INTRAVENOUS
Status: DISCONTINUED | OUTPATIENT
Start: 2024-09-23 | End: 2024-09-23 | Stop reason: HOSPADM

## 2024-09-23 RX ORDER — LIDOCAINE HYDROCHLORIDE 20 MG/ML
INJECTION, SOLUTION EPIDURAL; INFILTRATION; INTRACAUDAL; PERINEURAL PRN
Status: DISCONTINUED | OUTPATIENT
Start: 2024-09-23 | End: 2024-09-23 | Stop reason: SURG

## 2024-09-23 RX ORDER — EPHEDRINE SULFATE 50 MG/ML
INJECTION, SOLUTION INTRAVENOUS PRN
Status: DISCONTINUED | OUTPATIENT
Start: 2024-09-23 | End: 2024-09-23 | Stop reason: SURG

## 2024-09-23 RX ORDER — SODIUM CHLORIDE, SODIUM LACTATE, POTASSIUM CHLORIDE, CALCIUM CHLORIDE 600; 310; 30; 20 MG/100ML; MG/100ML; MG/100ML; MG/100ML
INJECTION, SOLUTION INTRAVENOUS
Status: DISCONTINUED | OUTPATIENT
Start: 2024-09-23 | End: 2024-09-23 | Stop reason: SURG

## 2024-09-23 RX ADMIN — PANTOPRAZOLE SODIUM 40 MG: 40 INJECTION, POWDER, FOR SOLUTION INTRAVENOUS at 06:08

## 2024-09-23 RX ADMIN — PROPOFOL 100 MG: 10 INJECTION, EMULSION INTRAVENOUS at 11:24

## 2024-09-23 RX ADMIN — LIDOCAINE HYDROCHLORIDE 100 MG: 20 INJECTION, SOLUTION EPIDURAL; INFILTRATION; INTRACAUDAL at 11:24

## 2024-09-23 RX ADMIN — AMLODIPINE BESYLATE 5 MG: 5 TABLET ORAL at 06:07

## 2024-09-23 RX ADMIN — EPHEDRINE SULFATE 10 MG: 50 INJECTION, SOLUTION INTRAVENOUS at 11:37

## 2024-09-23 RX ADMIN — LOSARTAN POTASSIUM 100 MG: 50 TABLET, FILM COATED ORAL at 06:07

## 2024-09-23 RX ADMIN — SODIUM CHLORIDE, POTASSIUM CHLORIDE, SODIUM LACTATE AND CALCIUM CHLORIDE: 600; 310; 30; 20 INJECTION, SOLUTION INTRAVENOUS at 11:24

## 2024-09-23 ASSESSMENT — PAIN DESCRIPTION - PAIN TYPE
TYPE: ACUTE PAIN
TYPE: SURGICAL PAIN
TYPE: SURGICAL PAIN

## 2024-09-23 ASSESSMENT — COGNITIVE AND FUNCTIONAL STATUS - GENERAL
DAILY ACTIVITIY SCORE: 23
SUGGESTED CMS G CODE MODIFIER DAILY ACTIVITY: CI
DRESSING REGULAR LOWER BODY CLOTHING: A LITTLE

## 2024-09-23 ASSESSMENT — ACTIVITIES OF DAILY LIVING (ADL): TOILETING: INDEPENDENT

## 2024-09-23 ASSESSMENT — PAIN SCALES - GENERAL: PAIN_LEVEL: 2

## 2024-09-23 NOTE — CARE PLAN
The patient is Stable - Low risk of patient condition declining or worsening    Shift Goals  Clinical Goals: pt will have colonscopy, pt will tolerate diet after colonoscopy  Patient Goals: get colonoscopy and discharge home  Family Goals: updates    Progress made toward(s) clinical / shift goals:    Problem: Knowledge Deficit - Standard  Goal: Patient and family/care givers will demonstrate understanding of plan of care, disease process/condition, diagnostic tests and medications  Outcome: Met     Problem: Fall Risk  Goal: Patient will remain free from falls  Outcome: Met     Problem: Risk for Fluid Volume Deficit Related to Bleeding  Goal: Fluid volume balance will be maintained  Outcome: Met  Goal: Patient will show no signs and symptoms of excessive bleeding  Outcome: Met     Problem: Risk for Bleeding  Goal: Patient will take measures to prevent bleeding and recognizes signs of bleeding that need to be reported immediately to a health care professional  Outcome: Met  Goal: Patient will not experience bleeding as evidenced by normal blood pressure, stable hematocrit and hemoglobin levels and desired ranges for coagulation profiles  Outcome: Met       Patient is not progressing towards the following goals:

## 2024-09-23 NOTE — PROGRESS NOTES
Pt discharged from unit. All belongings with pt. PIV removed. Discharge paperwork reviewed with patient, all questions answered, and paperwork signed. One copy with patient, and one copy kept to be scanned into patient's chart.   M2B delivered and reviewed with pt.

## 2024-09-23 NOTE — THERAPY
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 female admitted 9/21 for abdominal pain, weakness and bright red blood per rectum.  Colonoscopy 9/23 showing mild pandiverticulosis and internal hemorrhoids. Pt has PMHx atrial fibrillation on Eliquis, HLD, HTN. Patient was recently admitted from 9/5 through 9/10 for COVID-pneumonia with associated hypoxic respiratory failure and CHF exacerbation. Pt seen today with her daughter present who assisted with translation at times.  Pt currently able to complete basic ADL's with supervision.  Daughter reports pt lives with her other daughter & grandson & always has family close by.  Pt appears to be functioning at or close to her baseline & pans to D/C home today.  Anticipate that the patient will have no further occupational therapy needs after discharge from the hospital.     Plan    Occupational Therapy Initial Treatment Plan   Duration: Evaluation only    DC Equipment Recommendations: Tub / Shower Seat  Discharge Recommendations: Anticipate that the patient will have no further occupational therapy needs after discharge from the hospital     Subjective    \"I feel good\"     Objective       Services   Is patient using  services for this encounter? Yes   Language Interpreted Nigerien/Tag    Name pt's daughter    Mode Other   Other  Mode pt's daughter present to help her mom as needed with interpreting   Initial Contact Note    Initial Contact Note Order Received and Verified, Evaluation Only - Patient Does Not Require Further Acute Occupational Therapy at this Time.  However, May Benefit from Post Acute Therapy for Higher Level Functional Deficits.   Prior Living Situation   Prior " Services Intermittent Physical Support for ADL Per Family   Housing / Facility 1 Story House   Steps Into Home 1   Bathroom Set up Bathtub / Shower Combination   Equipment Owned Front-Wheel Walker;Bed Side Commode   Lives with - Patient's Self Care Capacity Adult Children   Comments Daughter is an ICU Nsg at the [employer]. Pt lives with a different daughter & grandson.  Family is always with pt to assist as needed   Prior Level of ADL Function   Self Feeding Independent   Grooming / Hygiene Independent   Bathing Independent   Dressing Independent   Toileting Independent   Comments daughter reports she helps pt with LB dressing as needed   Prior Level of IADL Function   Medication Management Requires Assist   Laundry Requires Assist   Kitchen Mobility Independent   Finances Requires Assist   Home Management Requires Assist   Shopping Requires Assist   Prior Level Of Mobility Independent With Device in Community   Occupation (Pre-Hospital Vocational) Retired Due To Age   Precautions   Precautions Fall Risk   Comments Pt has arthritis in both shoulders & right knee   Vitals   Room Air Oximetry 96   O2 Delivery Device None - Room Air   Pain   Pain Scales 0 to 10 Scale    Intervention Ambulation / Increased Activity   Pain 0 - 10 Group   Location Knee   Location Orientation Right   Description Sore   Therapist Pain Assessment During Activity;Nurse Notified;3   Cognition    Cognition / Consciousness WDL   Comments pt is very pleasant, motivated & prefers to be independent   Passive ROM Upper Body   Passive ROM Upper Body WDL   Active ROM Upper Body   Active ROM Upper Body  X   Dominant Hand Right   Rt Shoulder Flexion Degrees 95   Lt Shoulder Flexion Degrees 95   Strength Upper Body   Upper Body Strength  WDL   Comments pt has arthritic changes in bilateral shoulders   Coordination Upper Body   Coordination WDL   Balance Assessment   Sitting Balance (Static) Good   Sitting Balance (Dynamic) Good   Standing Balance (Static) Good    Standing Balance (Dynamic) Fair +   Weight Shift Sitting Good   Weight Shift Standing Fair   Comments Pt does have a limp from arthritic changes in her right knee   Bed Mobility    Supine to Sit Supervised   Sit to Supine Supervised   Scooting Supervised   Rolling Supervised   ADL Assessment   Eating Modified Independent   Grooming Supervision;Standing   Upper Body Dressing Supervision   Lower Body Dressing Contact Guard Assist   Toileting Standby Assist   Comments pt reports she needs to squat over the toilet instead of sitting on it to empty her bladder ever since her bladder suspension sx   Functional Mobility   Sit to Stand Standby Assist   Bed, Chair, Wheelchair Transfer Supervised   Toilet Transfers Supervised   Transfer Method Stand Step   Mobility pt able to amb in room with FWW & SBA   Activity Tolerance   Sitting in Chair 25   Sitting Edge of Bed 15   Standing 5   Education Group   Education Provided Activities of Daily Living;Adaptive Equipment   Role of Occupational Therapist Patient Response Patient;Family;Acceptance;Explanation;Verbal Demonstration   ADL Patient Response Patient;Acceptance;Explanation;Demonstration;Action Demonstration   Adaptive Equipment Patient Response Patient;Family;Explanation;Demonstration;Verbal Demonstration  (shower chair for home use)   Occupational Therapy Initial Treatment Plan    Duration Evaluation only   Anticipated Discharge Equipment and Recommendations   DC Equipment Recommendations Tub / Shower Seat   Discharge Recommendations Anticipate that the patient will have no further occupational therapy needs after discharge from the hospital   Interdisciplinary Plan of Care Collaboration   IDT Collaboration with  Nursing;Family / Caregiver   Patient Position at End of Therapy Seated;Edge of Bed;Call Light within Reach;Phone within Reach;Family / Friend in Room   Collaboration Comments daughter present throughout tx session   Session Information   Date / Session Number   9/23- eval only

## 2024-09-23 NOTE — ANESTHESIA PROCEDURE NOTES
Airway    Date/Time: 9/23/2024 11:24 AM    Performed by: Maurizio Jasso M.D.  Authorized by: Maurizio Jasso M.D.    Location:  OR  Urgency:  Elective  Indications for Airway Management:  Anesthesia      Spontaneous Ventilation: absent    Sedation Level:  Deep  Preoxygenated: Yes    Final Airway Type:  Supraglottic airway  Final Supraglottic Airway:  Standard LMA    SGA Size:  4  Number of Attempts at Approach:  1

## 2024-09-23 NOTE — PROGRESS NOTES
Pt d/c off unit in stable condition to d/c josé manuelunge with ANNALEE Brannon. Notified daughter.

## 2024-09-23 NOTE — ANESTHESIA PREPROCEDURE EVALUATION
Case: [id number] Date/Time: 09/23/24 1047    Procedure: COLONOSCOPY    Location: [facility] ROOM 26 / SURGERY SAME DAY [facility]    Surgeons: Arnoldo Valadez M.D.            Relevant Problems   CARDIAC   (positive) AF (atrial fibrillation) (HCC)   (positive) Hypertension      GI   (positive) GERD (gastroesophageal reflux disease)         (positive) Chronic kidney disease, stage 3a       Physical Exam    Airway   Mallampati: II  TM distance: >3 FB  Neck ROM: full       Cardiovascular - normal exam  Rhythm: regular  Rate: normal  (-) murmur     Dental - normal exam           Pulmonary - normal exam  Breath sounds clear to auscultation     Abdominal    Neurological - normal exam                   Anesthesia Plan    ASA 2       Plan - general       Airway plan will be LMA          Induction: intravenous    Postoperative Plan: Postoperative administration of opioids is intended.    Pertinent diagnostic labs and testing reviewed    Informed Consent:    Anesthetic plan and risks discussed with patient.    Use of blood products discussed with: patient whom consented to blood products.

## 2024-09-23 NOTE — THERAPY
09/23/24 0906   Time In/Time Out   Therapy Start Time 0906   Therapy End Time 0908   Total Therapy Time 2   Initial Contact Note    Initial Contact Note Order Received and Verified, Physical Therapy Evaluation in Progress with Full Report to Follow.   Interdisciplinary Plan of Care Collaboration   IDT Collaboration with  Nursing   Patient Position at End of Therapy In Bed   Collaboration Comments pt NpO going for Sx today   Session Information   Date / Session Number  9/23- Sx ( RAJANI)

## 2024-09-23 NOTE — OP REPORT
PreOp Diagnosis: Hematochezia      PostOp Diagnosis:   -Mild pandiverticulosis  -Mild internal hemorrhoids      Procedure(s):  COLONOSCOPY - Wound Class: Clean Contaminated    Surgeon(s):  Arnoldo Valadez M.D.    Anesthesiologist/Type of Anesthesia:  Anesthesiologist: Maurizio Jasso M.D./General    Surgical Staff:  Endoscopy Technician: Beata Farris  Endoscopy Nurse: Rosa Carolina R.N.    Specimens removed if any:  * No specimens in log *      CONSENT: The risks, benefits and alternatives of the procedure were discussed in detail with the patient and her daughter (power of ). The risks include and are not limited to bleeding, infection, perforation, missed lesions, and sedations risks (cardiopulmonary compromise and allergic reaction to medications).    DESCRIPTION:   The patient presented to the operating room.  A time out was performed prior to beginning the procedure.   The patient was placed in the left lateral position.   Patient was sedated by anesthesia: GETA.    OPERATIVE FINDINGS:    Digital rectal examination normal.  Endoscope advanced to cecum.  Withdrawal time 6 minutes.  Alger prep score:   Right colon: 3; Transverse colon: 3; Left Colon: 3. BPS score: 9  Careful inspection ensued on withdrawal.  Mild pandiverticulosis.  Internal hemorrhoids on retroflexion in the rectum.  Colonoscopy otherwise normal.    Blood loss: None    The patient tolerated the procedure well.      There were no immediate complications.    IMPRESSION:  Normal colonoscopy with the exception of mild pandiverticulosis and internal hemorrhoids.    RECOMMENDATIONS:  Given age do not recommend repeat colonoscopy unless clinically indicated.  Advance diet.  GI will sign off.  Reconsult where needed.

## 2024-09-23 NOTE — CARE PLAN
The patient is Stable - Low risk of patient condition declining or worsening    Shift Goals  Clinical Goals: monitor for bloody stool  Patient Goals: comfort  Family Goals: SATNAM    Progress made toward(s) clinical / shift goals:      Problem: Fall Risk  Goal: Patient will remain free from falls  Outcome: Progressing     Problem: Risk for Bleeding  Goal: Patient will take measures to prevent bleeding and recognizes signs of bleeding that need to be reported immediately to a health care professional  Outcome: Progressing       Patient is not progressing towards the following goals:

## 2024-09-23 NOTE — ANESTHESIA POSTPROCEDURE EVALUATION
Patient: Veneracion Reyes Villagracia    Procedure Summary       Date: 09/23/24 Room / Location: [facility] ROOM 26 / SURGERY SAME DAY [facility]    Anesthesia Start: 1121 Anesthesia Stop: 1156    Procedure: COLONOSCOPY (Anus) Diagnosis: (hemorrhoids, mild diverticulosis)    Surgeons: Arnoldo Valadez M.D. Responsible Provider: Maurizio Jasso M.D.    Anesthesia Type: general ASA Status: 2            Final Anesthesia Type: general  Last vitals  BP   Blood Pressure : 112/57    Temp   36.3 °C (97.4 °F)    Pulse   (!) 51   Resp   17    SpO2   99 %      Anesthesia Post Evaluation    Patient location during evaluation: PACU  Patient participation: complete - patient participated  Level of consciousness: awake and alert  Pain score: 2    Airway patency: patent  Anesthetic complications: no  Cardiovascular status: hemodynamically stable  Respiratory status: acceptable  Hydration status: euvolemic    PONV: none          There were no known notable events for this encounter.     Nurse Pain Score: 0 (NPRS)

## 2024-09-23 NOTE — ANESTHESIA TIME REPORT
Anesthesia Start and Stop Event Times       Date Time Event    9/23/2024 1106 Ready for Procedure     1121 Anesthesia Start     1156 Anesthesia Stop          Responsible Staff  09/23/24      Name Role Begin End    Maurizio Jasso M.D. Anesth 1121 1156          Overtime Reason:  no overtime (within assigned shift)    Comments:

## 2024-09-23 NOTE — DISCHARGE SUMMARY
Discharge Summary    CHIEF COMPLAINT ON ADMISSION  Chief Complaint   Patient presents with    Lower GI Bleed     Bright red blood to stool x 4 since this am. No streaks. Daughter reports pt is not bloodless. + dizziness, + weakness. - AB pain. Feels like she needs to poop. + thinners       Reason for Admission  Bloody stools     Admission Date  9/21/2024    CODE STATUS  DNAR/DNI    HPI & HOSPITAL COURSE    Roberto Lee is a 89-year-old female with PMHx atrial fibrillation on Eliquis, HLD, HTN.  Admitted 9/21 for abdominal pain, weakness and bright red blood per rectum.    Patient was recently admitted from 9/5 through 9/10 for COVID-pneumonia with associated hypoxic respiratory failure and CHF exacerbation.  Oxygen was titrated off during that hospitalization and patient discharged home with close outpatient follow-up.  She was seen by cardiology 9/19.  At that time patient was noted to have bradycardia and metoprolol was discontinued.  Eliquis dosing was reduced to 2.5 mg twice daily due to concern for bleeding.    Patient now presents with bright red blood per rectum.  CTA A/P: Negative for acute processes.  Gastroenterology was consulted from the emergency room.  Recommending colonoscopy.    Colonoscopy 9/23 showing mild pandiverticulosis and internal hemorrhoids.  Started patient on Anusol suppositories for management of hemorrhoids.  Recommend trialing Eliquis 2.5 mg twice daily for atrial fibrillation.  Patient has an upcoming lab check and appointment with primary care physician.  If patient continues to have anemia and rectal bleeding from internal hemorrhoids-there may need to be a conversation about discontinuing Eliquis moving forward.  I had a long discussion with patient's daughter at bedside regarding the risks and benefits of continuing anticoagulation at this time.  At this point we will proceed forward with resumption of Eliquis 2.5 mg twice daily.  ACP time spent 18 minutes.    Therefore,  she is discharged in good and stable condition to home with close outpatient follow-up.    The patient met 2-midnight criteria for an inpatient stay at the time of discharge.    Discharge Date  9/23/2024    FOLLOW UP ITEMS POST DISCHARGE  Follow-up with primary care physician 2 to 3 days  Follow-up with cardiology as scheduled    DISCHARGE DIAGNOSES  Principal Problem:    Hematochezia (POA: Yes)  Active Problems:    Dyslipidemia (POA: Yes)    Hypertension (POA: Unknown)    ACP (advance care planning) (POA: Unknown)    AF (atrial fibrillation) ([facility]) (POA: Yes)  Resolved Problems:    * No resolved hospital problems. *      FOLLOW UP  Future Appointments   Date Time Provider Department Center   10/24/2024  8:15 AM ISMA William None     No follow-up provider specified.    MEDICATIONS ON DISCHARGE     Medication List        START taking these medications        Instructions   hydrocortisone 25 MG Supp  Commonly known as: Anusol-HC   Insert 1 Suppository into the rectum every 12 hours.  Dose: 25 mg            CONTINUE taking these medications        Instructions   acetaminophen 325 MG Tabs  Commonly known as: Tylenol   Take 2 Tablets by mouth every 6 hours as needed for Moderate Pain.  Dose: 650 mg     amLODIPine 5 MG Tabs  Commonly known as: Norvasc   Take 1 Tablet by mouth every day.  Dose: 5 mg     apixaban 2.5mg Tabs  Commonly known as: Eliquis   Take 1 Tablet by mouth 2 times a day.  Dose: 2.5 mg     cefpodoxime 200 MG Tabs  Commonly known as: Vantin   Take 200 mg by mouth 2 times a day. 3 day supply  Dose: 200 mg     dexamethasone 6 MG Tabs  Commonly known as: Decadron   Take 6 mg by mouth every day.  Dose: 6 mg     hydrOXYzine HCl 25 MG Tabs  Commonly known as: Atarax   Take 25 mg by mouth 3 times a day as needed for Anxiety.  Dose: 25 mg     Kerendia 10 MG Tabs  Generic drug: Finerenone   Take 10 mg by mouth every day.  Dose: 10 mg     losartan 100 MG Tabs  Commonly known as: Cozaar   Doctor's  comments: Patient going on extended vacation, needs early refill  Take 1 Tablet by mouth every day.  Dose: 100 mg     lovastatin 20 MG Tabs  Commonly known as: Mevacor   Doctor's comments: Patient going on extended vacation, needs early refill  Take 1 Tablet by mouth every day.  Dose: 20 mg     Melatonin 1 MG Caps   Take 1 mg by mouth at bedtime.  Dose: 1 mg     Mucinex 600 MG Tb12  Generic drug: guaiFENesin ER   Take 1 Tablet by mouth 2 times a day as needed for Cough (productive cough).  Dose: 600 mg     Vitamin C 1000 MG Tabs   Take  by mouth.              Allergies  Allergies   Allergen Reactions    Penicillins Shortness of Breath and Rash     Occurred several years ago in the [facility]       DIET  Orders Placed This Encounter   Procedures    Diet Order Diet: Regular     Standing Status:   Standing     Number of Occurrences:   1     Order Specific Question:   Diet:     Answer:   Regular [1]       ACTIVITY  As tolerated.  Weight bearing as tolerated    CONSULTATIONS  Gastroenterology    PROCEDURES  Colonoscopy 9/23    LABORATORY  Lab Results   Component Value Date    SODIUM 135 09/23/2024    POTASSIUM 4.4 09/23/2024    CHLORIDE 103 09/23/2024    CO2 21 09/23/2024    GLUCOSE 98 09/23/2024    BUN 13 09/23/2024    CREATININE 0.96 09/23/2024        Lab Results   Component Value Date    WBC 8.9 09/23/2024    HEMOGLOBIN 10.6 (L) 09/23/2024    HEMATOCRIT 31.9 (L) 09/23/2024    PLATELETCT 159 (L) 09/23/2024      CTA ABDOMEN PELVIS W & W/O POST PROCESS   Final Result      1.  Negative CT angiogram of the abdomen and pelvis      2.  Aortic atherosclerotic plaque      3.  Mild atelectasis      4.  Prior hysterectomy            Total time of the discharge process exceeds 48 minutes.

## 2024-09-23 NOTE — PROGRESS NOTES
"Received alert and oriented x 4. Chinik with HA at the bedside ON bowel prep. Check vitals sign and recorded accordingly and due med given per MAR. Monitor sign and symptoms of respiratory distress and treatment given accordingly per MAR.Medicated per MAR and reassessed every 2 hours per protocol. Call light within reach. Bed alarm in placed. Needs attended. Continue to monitor./62   Pulse 73   Temp 36.4 °C (97.6 °F) (Temporal)   Resp 18   Ht 1.499 m (4' 11\")   Wt 69.4 kg (153 lb)   SpO2 95%   BMI 30.90 kg/m² .  "

## 2024-09-23 NOTE — PROGRESS NOTES
Report received from NOC RN and assumed patient care at 0700. Patient is A&Ox 4, on 2L NC, and bed alarm is on at this time . Patient declines pain at this time. Call light within reach and bed in lowest position. Reinforced the need to call for assistance. Plan of care discussed and patient does not have any further needs at this time.

## 2024-09-23 NOTE — CARE PLAN
The patient is Stable - Low risk of patient condition declining or worsening    Shift Goals  Clinical Goals: bowel prep for [place], safety  Patient Goals: comfort  Family Goals: SATNAM    Progress made toward(s) clinical / shift goals:  completed bowel prep with clear no remnant 3 bowel movement and no fall the end of the shift by continuing bed alarm and assistance.    Patient is not progressing towards the following goals:

## 2024-09-23 NOTE — OR NURSING
1153 Patient arrived from OR to PACU18. Connected to monitor and report received from anesthesia and RN. VSS. 6 L 02 via mask. No airway in place. Breaths calm, even, unlabored.     1204: Pt waking up; on room air. Pt denies pain and nausea. Dentures given back to pt. Pt tolerated a few sips of water    1208: Updated pt's daughter via phone      1221: Report to floor ALANNA Stokes via phone    1232: Pt meets criteria to return to floor. [facility] [place] tech and daughter. Pt wearing dentures and no other belongings here with pt.

## 2024-09-24 ENCOUNTER — PATIENT OUTREACH (OUTPATIENT)
Dept: MEDICAL GROUP | Facility: LAB | Age: 89
End: 2024-09-24
Payer: MEDICARE

## 2024-09-24 NOTE — PROGRESS NOTES
Transitional Care Management  TCM Outreach Date and Time: Filed (9/24/2024  1:15 PM)    Discharge Questions  Actual Discharge Date: 09/23/24  Now that you are home, how are you feeling?: Good  Did you receive any new prescriptions?: Yes (hydrocortisone suppository)  Were you able to get them filled?: Yes  Meds to Bed or Pharmacy filled?: Meds to Bed  Do you have any questions about your current medications or new medications (Review Med Rec)?: No  Did you have any durable medical equipment ordered?: No  Do you have a follow up appointment scheduled with your PCP?: No  Was an appointment scheduled for the patient?: No  Reason not scheduled?: Declines  If Home Health was ordered, have they contacted you (Patient): Not Applicable  Did you have enough support after your last discharge?: Yes (daughter)  Does this patient qualify for the CCM program?: No    Transitional Care  Number of attempts made to contact patient: 1  Current or previous attempts completed within two business days of discharge? : Yes  Provided education regarding treatment plan, medications, self-management, ADLs?: No  Has patient completed an Advanced Directive?: Yes  Is the patient's advanced directive on file?: Yes  Has the Care Manager's phone number provided?: No  Is there anything else I can help you with?: No    Discharge Summary  Chief Complaint: Lower GI Bleed        Bright red blood to stool x 4 since this am. No streaks. Daughter reports pt is not bloodless. + dizziness, + weakness. - AB pain. Feels like she needs to poop. + thinners  Admitting Diagnosis: Bloody Stools  Discharge Diagnosis: Hematochezia

## 2024-10-14 DIAGNOSIS — I48.91 ATRIAL FIBRILLATION, UNSPECIFIED TYPE (HCC): ICD-10-CM

## 2024-10-14 RX ORDER — AMLODIPINE BESYLATE 5 MG/1
5 TABLET ORAL DAILY
Qty: 90 TABLET | Refills: 3 | Status: SHIPPED | OUTPATIENT
Start: 2024-10-14

## 2024-10-21 ENCOUNTER — OFFICE VISIT (OUTPATIENT)
Dept: MEDICAL GROUP | Facility: LAB | Age: 89
End: 2024-10-21
Payer: MEDICARE

## 2024-10-21 ENCOUNTER — HOSPITAL ENCOUNTER (OUTPATIENT)
Dept: LAB | Facility: MEDICAL CENTER | Age: 89
End: 2024-10-21
Attending: NURSE PRACTITIONER
Payer: MEDICARE

## 2024-10-21 VITALS
OXYGEN SATURATION: 94 % | DIASTOLIC BLOOD PRESSURE: 56 MMHG | TEMPERATURE: 98.9 F | RESPIRATION RATE: 14 BRPM | WEIGHT: 152 LBS | HEIGHT: 59 IN | BODY MASS INDEX: 30.64 KG/M2 | SYSTOLIC BLOOD PRESSURE: 128 MMHG | HEART RATE: 64 BPM

## 2024-10-21 DIAGNOSIS — I50.32 CHRONIC DIASTOLIC HEART FAILURE (HCC): ICD-10-CM

## 2024-10-21 DIAGNOSIS — N18.31 CHRONIC KIDNEY DISEASE, STAGE 3A: ICD-10-CM

## 2024-10-21 DIAGNOSIS — D53.9 MACROCYTIC ANEMIA: ICD-10-CM

## 2024-10-21 DIAGNOSIS — I48.91 ATRIAL FIBRILLATION, UNSPECIFIED TYPE (HCC): ICD-10-CM

## 2024-10-21 DIAGNOSIS — Z09 HOSPITAL DISCHARGE FOLLOW-UP: ICD-10-CM

## 2024-10-21 PROBLEM — U07.1 COVID-19: Status: RESOLVED | Noted: 2024-09-05 | Resolved: 2024-10-21

## 2024-10-21 PROBLEM — K92.1 HEMATOCHEZIA: Status: RESOLVED | Noted: 2024-09-21 | Resolved: 2024-10-21

## 2024-10-21 LAB
BASOPHILS # BLD AUTO: 0.6 % (ref 0–1.8)
BASOPHILS # BLD: 0.05 K/UL (ref 0–0.12)
EOSINOPHIL # BLD AUTO: 0.08 K/UL (ref 0–0.51)
EOSINOPHIL NFR BLD: 0.9 % (ref 0–6.9)
ERYTHROCYTE [DISTWIDTH] IN BLOOD BY AUTOMATED COUNT: 52.5 FL (ref 35.9–50)
HCT VFR BLD AUTO: 34.5 % (ref 37–47)
HGB BLD-MCNC: 11.2 G/DL (ref 12–16)
IMM GRANULOCYTES # BLD AUTO: 0.02 K/UL (ref 0–0.11)
IMM GRANULOCYTES NFR BLD AUTO: 0.2 % (ref 0–0.9)
LYMPHOCYTES # BLD AUTO: 2.98 K/UL (ref 1–4.8)
LYMPHOCYTES NFR BLD: 33.3 % (ref 22–41)
MCH RBC QN AUTO: 32.8 PG (ref 27–33)
MCHC RBC AUTO-ENTMCNC: 32.5 G/DL (ref 32.2–35.5)
MCV RBC AUTO: 101.2 FL (ref 81.4–97.8)
MONOCYTES # BLD AUTO: 0.89 K/UL (ref 0–0.85)
MONOCYTES NFR BLD AUTO: 10 % (ref 0–13.4)
NEUTROPHILS # BLD AUTO: 4.92 K/UL (ref 1.82–7.42)
NEUTROPHILS NFR BLD: 55 % (ref 44–72)
NRBC # BLD AUTO: 0 K/UL
NRBC BLD-RTO: 0 /100 WBC (ref 0–0.2)
PLATELET # BLD AUTO: 188 K/UL (ref 164–446)
PMV BLD AUTO: 11.2 FL (ref 9–12.9)
RBC # BLD AUTO: 3.41 M/UL (ref 4.2–5.4)
WBC # BLD AUTO: 8.9 K/UL (ref 4.8–10.8)

## 2024-10-21 PROCEDURE — 99214 OFFICE O/P EST MOD 30 MIN: CPT | Performed by: NURSE PRACTITIONER

## 2024-10-21 PROCEDURE — 36415 COLL VENOUS BLD VENIPUNCTURE: CPT

## 2024-10-21 PROCEDURE — 3078F DIAST BP <80 MM HG: CPT | Performed by: NURSE PRACTITIONER

## 2024-10-21 PROCEDURE — 3074F SYST BP LT 130 MM HG: CPT | Performed by: NURSE PRACTITIONER

## 2024-10-21 PROCEDURE — 85025 COMPLETE CBC W/AUTO DIFF WBC: CPT

## 2024-10-21 PROCEDURE — 80053 COMPREHEN METABOLIC PANEL: CPT

## 2024-10-21 RX ORDER — FUROSEMIDE 20 MG/1
20 TABLET ORAL DAILY
COMMUNITY

## 2024-10-21 ASSESSMENT — FIBROSIS 4 INDEX: FIB4 SCORE: 3.14

## 2024-10-22 LAB
ALBUMIN SERPL BCP-MCNC: 4.1 G/DL (ref 3.2–4.9)
ALBUMIN/GLOB SERPL: 1.4 G/DL
ALP SERPL-CCNC: 91 U/L (ref 30–99)
ALT SERPL-CCNC: 36 U/L (ref 2–50)
ANION GAP SERPL CALC-SCNC: 9 MMOL/L (ref 7–16)
AST SERPL-CCNC: 39 U/L (ref 12–45)
BILIRUB SERPL-MCNC: 0.5 MG/DL (ref 0.1–1.5)
BUN SERPL-MCNC: 15 MG/DL (ref 8–22)
CALCIUM ALBUM COR SERPL-MCNC: 9.6 MG/DL (ref 8.5–10.5)
CALCIUM SERPL-MCNC: 9.7 MG/DL (ref 8.5–10.5)
CHLORIDE SERPL-SCNC: 105 MMOL/L (ref 96–112)
CO2 SERPL-SCNC: 21 MMOL/L (ref 20–33)
CREAT SERPL-MCNC: 0.97 MG/DL (ref 0.5–1.4)
FASTING STATUS PATIENT QL REPORTED: NORMAL
GFR SERPLBLD CREATININE-BSD FMLA CKD-EPI: 55 ML/MIN/1.73 M 2
GLOBULIN SER CALC-MCNC: 3 G/DL (ref 1.9–3.5)
GLUCOSE SERPL-MCNC: 99 MG/DL (ref 65–99)
POTASSIUM SERPL-SCNC: 4.5 MMOL/L (ref 3.6–5.5)
PROT SERPL-MCNC: 7.1 G/DL (ref 6–8.2)
SODIUM SERPL-SCNC: 135 MMOL/L (ref 135–145)

## 2024-10-24 ENCOUNTER — APPOINTMENT (OUTPATIENT)
Dept: CARDIOLOGY | Facility: MEDICAL CENTER | Age: 89
End: 2024-10-24
Attending: NURSE PRACTITIONER
Payer: MEDICARE

## 2024-12-17 ENCOUNTER — TELEPHONE (OUTPATIENT)
Dept: CARDIOLOGY | Facility: MEDICAL CENTER | Age: 89
End: 2024-12-17
Payer: MEDICARE

## 2024-12-17 NOTE — TELEPHONE ENCOUNTER
Call placed to patient as a reminder for blood work to be completed prior to appt. No answer, LVM.

## 2024-12-23 ENCOUNTER — APPOINTMENT (OUTPATIENT)
Dept: CARDIOLOGY | Facility: MEDICAL CENTER | Age: 89
End: 2024-12-23
Attending: NURSE PRACTITIONER
Payer: MEDICARE

## 2025-01-06 DIAGNOSIS — I48.91 ATRIAL FIBRILLATION, UNSPECIFIED TYPE (HCC): ICD-10-CM

## 2025-01-07 RX ORDER — AMLODIPINE BESYLATE 5 MG/1
5 TABLET ORAL DAILY
Qty: 90 TABLET | Refills: 3 | Status: SHIPPED | OUTPATIENT
Start: 2025-01-07

## 2025-01-21 DIAGNOSIS — I48.0 PAROXYSMAL ATRIAL FIBRILLATION (HCC): ICD-10-CM

## 2025-01-21 NOTE — TELEPHONE ENCOUNTER
Received request via: Patient    Was the patient seen in the last year in this department? Yes  LOV : 10/21/2024   Does the patient have an active prescription (recently filled or refills available) for medication(s) requested? No    Pharmacy Name: [facility]    Does the patient have skilled nursing Plus and need 100-day supply? (This applies to ALL medications) Patient does not have SCP

## 2025-04-02 DIAGNOSIS — I48.91 ATRIAL FIBRILLATION, UNSPECIFIED TYPE (HCC): ICD-10-CM

## 2025-04-02 RX ORDER — AMLODIPINE BESYLATE 5 MG/1
5 TABLET ORAL DAILY
Qty: 90 TABLET | Refills: 3 | Status: SHIPPED | OUTPATIENT
Start: 2025-04-02

## 2025-04-02 NOTE — TELEPHONE ENCOUNTER
Received request via: Patient    Was the patient seen in the last year in this department? Yes    Does the patient have an active prescription (recently filled or refills available) for medication(s) requested? No    Pharmacy Name: [facility]     Does the patient have [facility] Plus and need 100-day supply? (This applies to ALL medications) Patient does not have SCP

## 2025-04-21 DIAGNOSIS — E78.5 DYSLIPIDEMIA: ICD-10-CM

## 2025-04-21 DIAGNOSIS — I50.32 CHRONIC DIASTOLIC HEART FAILURE (HCC): ICD-10-CM

## 2025-04-21 RX ORDER — LOVASTATIN 20 MG/1
20 TABLET ORAL DAILY
Qty: 90 TABLET | Refills: 3 | Status: SHIPPED | OUTPATIENT
Start: 2025-04-21

## 2025-04-21 RX ORDER — LOSARTAN POTASSIUM 100 MG/1
100 TABLET ORAL DAILY
Qty: 90 TABLET | Refills: 3 | Status: SHIPPED | OUTPATIENT
Start: 2025-04-21

## 2025-06-30 DIAGNOSIS — I48.91 ATRIAL FIBRILLATION, UNSPECIFIED TYPE (HCC): ICD-10-CM

## 2025-06-30 RX ORDER — FUROSEMIDE 20 MG/1
20 TABLET ORAL DAILY
Qty: 90 TABLET | Refills: 3 | Status: SHIPPED | OUTPATIENT
Start: 2025-06-30

## 2025-06-30 RX ORDER — AMLODIPINE BESYLATE 5 MG/1
5 TABLET ORAL DAILY
Qty: 90 TABLET | Refills: 3 | Status: SHIPPED | OUTPATIENT
Start: 2025-06-30

## 2025-07-19 DIAGNOSIS — I50.32 CHRONIC DIASTOLIC HEART FAILURE (HCC): ICD-10-CM

## 2025-07-19 DIAGNOSIS — E78.5 DYSLIPIDEMIA: ICD-10-CM

## 2025-07-21 RX ORDER — LOVASTATIN 20 MG/1
20 TABLET ORAL DAILY
Qty: 90 TABLET | Refills: 0 | Status: SHIPPED | OUTPATIENT
Start: 2025-07-21

## 2025-07-21 RX ORDER — LOSARTAN POTASSIUM 100 MG/1
100 TABLET ORAL DAILY
Qty: 90 TABLET | Refills: 0 | Status: SHIPPED | OUTPATIENT
Start: 2025-07-21

## 2025-07-25 ENCOUNTER — HOSPITAL ENCOUNTER (EMERGENCY)
Facility: MEDICAL CENTER | Age: OVER 89
End: 2025-07-25
Attending: EMERGENCY MEDICINE
Payer: MEDICARE

## 2025-07-25 ENCOUNTER — APPOINTMENT (OUTPATIENT)
Dept: RADIOLOGY | Facility: MEDICAL CENTER | Age: OVER 89
End: 2025-07-25
Attending: EMERGENCY MEDICINE
Payer: MEDICARE

## 2025-07-25 VITALS
BODY MASS INDEX: 29.23 KG/M2 | WEIGHT: 145 LBS | HEART RATE: 77 BPM | RESPIRATION RATE: 18 BRPM | HEIGHT: 59 IN | SYSTOLIC BLOOD PRESSURE: 134 MMHG | OXYGEN SATURATION: 96 % | TEMPERATURE: 98.6 F | DIASTOLIC BLOOD PRESSURE: 63 MMHG

## 2025-07-25 DIAGNOSIS — B02.9 HERPES ZOSTER WITHOUT COMPLICATION: Primary | ICD-10-CM

## 2025-07-25 LAB
ALBUMIN SERPL BCP-MCNC: 4.2 G/DL (ref 3.2–4.9)
ALBUMIN/GLOB SERPL: 1.1 G/DL
ALP SERPL-CCNC: 94 U/L (ref 30–99)
ALT SERPL-CCNC: 12 U/L (ref 2–50)
ANION GAP SERPL CALC-SCNC: 16 MMOL/L (ref 7–16)
AST SERPL-CCNC: 26 U/L (ref 12–45)
BASOPHILS # BLD AUTO: 0.6 % (ref 0–1.8)
BASOPHILS # BLD: 0.05 K/UL (ref 0–0.12)
BILIRUB SERPL-MCNC: 0.7 MG/DL (ref 0.1–1.5)
BUN SERPL-MCNC: 17 MG/DL (ref 8–22)
CALCIUM ALBUM COR SERPL-MCNC: 9.9 MG/DL (ref 8.5–10.5)
CALCIUM SERPL-MCNC: 10.1 MG/DL (ref 8.5–10.5)
CHLORIDE SERPL-SCNC: 95 MMOL/L (ref 96–112)
CO2 SERPL-SCNC: 21 MMOL/L (ref 20–33)
CREAT SERPL-MCNC: 1 MG/DL (ref 0.5–1.4)
EKG IMPRESSION: NORMAL
EOSINOPHIL # BLD AUTO: 0.08 K/UL (ref 0–0.51)
EOSINOPHIL NFR BLD: 1 % (ref 0–6.9)
ERYTHROCYTE [DISTWIDTH] IN BLOOD BY AUTOMATED COUNT: 46.9 FL (ref 35.9–50)
GFR SERPLBLD CREATININE-BSD FMLA CKD-EPI: 52 ML/MIN/1.73 M 2
GLOBULIN SER CALC-MCNC: 4 G/DL (ref 1.9–3.5)
GLUCOSE SERPL-MCNC: 115 MG/DL (ref 65–99)
HCT VFR BLD AUTO: 36.8 % (ref 37–47)
HGB BLD-MCNC: 12.4 G/DL (ref 12–16)
IMM GRANULOCYTES # BLD AUTO: 0.02 K/UL (ref 0–0.11)
IMM GRANULOCYTES NFR BLD AUTO: 0.3 % (ref 0–0.9)
LYMPHOCYTES # BLD AUTO: 1.81 K/UL (ref 1–4.8)
LYMPHOCYTES NFR BLD: 22.8 % (ref 22–41)
MCH RBC QN AUTO: 32.5 PG (ref 27–33)
MCHC RBC AUTO-ENTMCNC: 33.7 G/DL (ref 32.2–35.5)
MCV RBC AUTO: 96.6 FL (ref 81.4–97.8)
MONOCYTES # BLD AUTO: 0.84 K/UL (ref 0–0.85)
MONOCYTES NFR BLD AUTO: 10.6 % (ref 0–13.4)
NEUTROPHILS # BLD AUTO: 5.15 K/UL (ref 1.82–7.42)
NEUTROPHILS NFR BLD: 64.7 % (ref 44–72)
NRBC # BLD AUTO: 0 K/UL
NRBC BLD-RTO: 0 /100 WBC (ref 0–0.2)
PLATELET # BLD AUTO: 216 K/UL (ref 164–446)
PMV BLD AUTO: 9.8 FL (ref 9–12.9)
POTASSIUM SERPL-SCNC: 4.5 MMOL/L (ref 3.6–5.5)
PROT SERPL-MCNC: 8.2 G/DL (ref 6–8.2)
RBC # BLD AUTO: 3.81 M/UL (ref 4.2–5.4)
SODIUM SERPL-SCNC: 132 MMOL/L (ref 135–145)
TROPONIN T SERPL-MCNC: 21 NG/L (ref 6–19)
WBC # BLD AUTO: 8 K/UL (ref 4.8–10.8)

## 2025-07-25 PROCEDURE — 700111 HCHG RX REV CODE 636 W/ 250 OVERRIDE (IP): Mod: JZ | Performed by: EMERGENCY MEDICINE

## 2025-07-25 PROCEDURE — 36415 COLL VENOUS BLD VENIPUNCTURE: CPT

## 2025-07-25 PROCEDURE — 85025 COMPLETE CBC W/AUTO DIFF WBC: CPT

## 2025-07-25 PROCEDURE — 99284 EMERGENCY DEPT VISIT MOD MDM: CPT

## 2025-07-25 PROCEDURE — 71045 X-RAY EXAM CHEST 1 VIEW: CPT

## 2025-07-25 PROCEDURE — 93005 ELECTROCARDIOGRAM TRACING: CPT | Mod: TC | Performed by: EMERGENCY MEDICINE

## 2025-07-25 PROCEDURE — 96374 THER/PROPH/DIAG INJ IV PUSH: CPT

## 2025-07-25 PROCEDURE — 80053 COMPREHEN METABOLIC PANEL: CPT

## 2025-07-25 PROCEDURE — 84484 ASSAY OF TROPONIN QUANT: CPT

## 2025-07-25 PROCEDURE — 93005 ELECTROCARDIOGRAM TRACING: CPT | Mod: TC

## 2025-07-25 RX ORDER — MORPHINE SULFATE 4 MG/ML
4 INJECTION INTRAVENOUS ONCE
Status: COMPLETED | OUTPATIENT
Start: 2025-07-25 | End: 2025-07-25

## 2025-07-25 RX ORDER — ONDANSETRON 2 MG/ML
4 INJECTION INTRAMUSCULAR; INTRAVENOUS ONCE
Status: DISCONTINUED | OUTPATIENT
Start: 2025-07-25 | End: 2025-07-25 | Stop reason: HOSPADM

## 2025-07-25 RX ORDER — ONDANSETRON 4 MG/1
4 TABLET, ORALLY DISINTEGRATING ORAL EVERY 6 HOURS PRN
Qty: 10 TABLET | Refills: 0 | Status: SHIPPED | OUTPATIENT
Start: 2025-07-25

## 2025-07-25 RX ORDER — VALACYCLOVIR HYDROCHLORIDE 1 G/1
1000 TABLET, FILM COATED ORAL 2 TIMES DAILY
Qty: 20 TABLET | Refills: 0 | Status: ACTIVE | OUTPATIENT
Start: 2025-07-25

## 2025-07-25 RX ORDER — HYDROCODONE BITARTRATE AND ACETAMINOPHEN 5; 325 MG/1; MG/1
1 TABLET ORAL EVERY 6 HOURS PRN
Qty: 16 TABLET | Refills: 0 | Status: SHIPPED | OUTPATIENT
Start: 2025-07-25 | End: 2025-07-29

## 2025-07-25 RX ADMIN — MORPHINE SULFATE 4 MG: 4 INJECTION, SOLUTION INTRAMUSCULAR; INTRAVENOUS at 10:37

## 2025-07-25 ASSESSMENT — PAIN DESCRIPTION - PAIN TYPE
TYPE: ACUTE PAIN
TYPE: ACUTE PAIN

## 2025-07-25 ASSESSMENT — FIBROSIS 4 INDEX: FIB4 SCORE: 3.22

## 2025-07-25 NOTE — ED NOTES
Bedside report received from off going RN/tech: Lea, assumed care of patient.  POC discussed with patient. Call light within reach, all needs addressed at this time.       Fall risk interventions in place: Patient's personal possessions are with in their safe reach and Keep floor surfaces clean and dry (all applicable per [place] Fall risk assessment)   Continuous monitoring: Cardiac Leads, Pulse Ox, or Blood Pressure  IVF/IV medications: Not Applicable   Oxygen: Room Air  Bedside sitter: Not Applicable   Isolation: Not Applicable

## 2025-07-25 NOTE — ED PROVIDER NOTES
ED Provider Note    CHIEF COMPLAINT  Chief Complaint   Patient presents with    Back Pain     Upper back pain x 2 days with no relief. Radiates to Right shoulder and neck    Unable to Sleep       EXTERNAL RECORDS REVIEWED  Outpatient Notes  [facility]    HPI/ROS  LIMITATION TO HISTORY   Language  OUTSIDE HISTORIAN(S):  Daughter is translating.  States patient had right upper shoulder pain and rash.    Veneracion Reyes Villagracia is a 89 y.o. female who presents here for evaluation of right upper back and shoulder pain for about 2 to 3 days.  Patient states that she has had this pain that is worse with movements associate with burning pain, however today developed some rash with vesicles in that same distribution.  Patient has no fever chills or vomiting, she has no chest pain or shortness of breath.  No abdominal pain.    PAST MEDICAL HISTORY   has a past medical history of Arthritis, Cataract, COVID-19 (09/05/2024), Dental disorder, GI bleed, Heart burn, Hematochezia (09/21/2024), High cholesterol, Hypercholesteremia, Hypertension, and Urinary tract infection (08/2019).    SURGICAL HISTORY   has a past surgical history that includes breast biopsy (Left, 6/14/2018); other; anterior and posterior repair (N/A, 9/3/2019); enterocele repair (N/A, 9/3/2019); bladder sling female (N/A, 9/3/2019); vaginal suspension (N/A, 9/3/2019); and colonoscopy-flexible (N/A, 9/23/2024).    FAMILY HISTORY  Family History   Problem Relation Age of Onset    Stroke Father     Breast Cancer Sister     Alcohol abuse Brother        SOCIAL HISTORY  Social History     Tobacco Use    Smoking status: Never    Smokeless tobacco: Never   Vaping Use    Vaping status: Never Used   Substance and Sexual Activity    Alcohol use: No    Drug use: No    Sexual activity: Never     Partners: Male       CURRENT MEDICATIONS  Home Medications       Reviewed by Zhane Lee R.N. (Registered Nurse) on 07/25/25 at 0916  Med List Status:  "Partial     Medication Last Dose Status   acetaminophen (TYLENOL) 325 MG Tab  Active   amLODIPine (NORVASC) 5 MG Tab  Active   apixaban (ELIQUIS) 2.5mg Tab  Active   Ascorbic Acid (VITAMIN C) 1000 MG Tab  Active   cefpodoxime (VANTIN) 200 MG Tab  Active   dexamethasone (DECADRON) 6 MG Tab  Active   Finerenone (KERENDIA) 10 MG Tab  Active   furosemide (LASIX) 20 MG Tab  Active   guaiFENesin ER (MUCINEX) 600 MG TABLET SR 12 HR  Active   hydrocortisone (ANUSOL-HC) 25 MG Suppos  Active   hydrOXYzine HCl (ATARAX) 25 MG Tab  Active   losartan (COZAAR) 100 MG Tab  Active   lovastatin (MEVACOR) 20 MG Tab  Active   Melatonin 1 MG Cap  Active                  Audit from Redirected Encounters    **Home medications have not yet been reviewed for this encounter**         ALLERGIES  Allergies[1]    PHYSICAL EXAM  VITAL SIGNS: BP (!) 152/65   Pulse 85   Temp 37 °C (98.6 °F) (Temporal)   Resp 18   Ht 1.499 m (4' 11\")   Wt 65.8 kg (145 lb)   SpO2 91%   BMI 29.29 kg/m²    Constitutional: Well developed, well nourished.  Mild acute distress.  HEENT: Normocephalic, atraumatic. Posterior pharynx clear and moist.  Eyes:  EOMI. Normal sclera.  Neck: Supple, Full range of motion, nontender.  Chest/Pulmonary: clear to ausculation. Symmetrical expansion.   Cardio: Regular rate and rhythm with no murmur.   Abdomen: Soft, nontender. No peritoneal signs. No guarding. No palpable masses.  Back: No CVA tenderness, nontender midline, no step offs.  Musculoskeletal: No deformity, no edema, neurovascular intact.  Right upper extremity; posterior shoulder and upper arm with vesicles noted.  Consistent with shingles.  Neurovascular intact distally.  Small lesion to the right upper back as well.  Neuro: Clear speech, appropriate, cooperative, cranial nerves II-XII grossly intact.  Psych: Normal mood and affect    EKG; normal sinus rhythm at a rate of 81.  No ST elevation or ST depression.  QTc is 461.  Compared to EKG from " 2024.    EKG/LABS  Results for orders placed or performed during the hospital encounter of 25   EKG    Collection Time: 25  9:24 AM   Result Value Ref Range    Report       [facility] Emergency Dept[facility]    Test Date:  2025  Pt Name:    RADHA FIORE       Department: ER  MRN:        [id number]                      Room:  Gender:     Female                       Technician: [id number]  :        [id number]                   Requested By:ER TRIAGE PROTOCOL  Order #:    [id number]                    Reading MD:    Measurements  Intervals                                Axis  Rate:       81                           P:          0  MI:         262                          QRS:        20  QRSD:       110                          T:          40  QT:         397  QTc:        461    Interpretive Statements  Sinus rhythm  Prolonged MI interval  Compared to ECG 2024 13:15:31  Sinus bradycardia no longer present     CBC w/ Differential    Collection Time: 25  9:57 AM   Result Value Ref Range    WBC 8.0 4.8 - 10.8 K/uL    RBC 3.81 (L) 4.20 - 5.40 M/uL    Hemoglobin 12.4 12.0 - 16.0 g/dL    Hematocrit 36.8 (L) 37.0 - 47.0 %    MCV 96.6 81.4 - 97.8 fL    MCH 32.5 27.0 - 33.0 pg    MCHC 33.7 32.2 - 35.5 g/dL    RDW 46.9 35.9 - 50.0 fL    Platelet Count 216 164 - 446 K/uL    MPV 9.8 9.0 - 12.9 fL    Neutrophils-Polys 64.70 44.00 - 72.00 %    Lymphocytes 22.80 22.00 - 41.00 %    Monocytes 10.60 0.00 - 13.40 %    Eosinophils 1.00 0.00 - 6.90 %    Basophils 0.60 0.00 - 1.80 %    Immature Granulocytes 0.30 0.00 - 0.90 %    Nucleated RBC 0.00 0.00 - 0.20 /100 WBC    Neutrophils (Absolute) 5.15 1.82 - 7.42 K/uL    Lymphs (Absolute) 1.81 1.00 - 4.80 K/uL    Monos (Absolute) 0.84 0.00 - 0.85 K/uL    Eos (Absolute) 0.08 0.00 - 0.51 K/uL    Baso (Absolute) 0.05 0.00 - 0.12 K/uL    Immature Granulocytes (abs) 0.02 0.00 - 0.11 K/uL    NRBC (Absolute) 0.00 K/uL   Complete Metabolic Panel (CMP)     Collection Time: 07/25/25  9:57 AM   Result Value Ref Range    Sodium 132 (L) 135 - 145 mmol/L    Potassium 4.5 3.6 - 5.5 mmol/L    Chloride 95 (L) 96 - 112 mmol/L    Co2 21 20 - 33 mmol/L    Anion Gap 16.0 7.0 - 16.0    Glucose 115 (H) 65 - 99 mg/dL    Bun 17 8 - 22 mg/dL    Creatinine 1.00 0.50 - 1.40 mg/dL    Calcium 10.1 8.5 - 10.5 mg/dL    Correct Calcium 9.9 8.5 - 10.5 mg/dL    AST(SGOT) 26 12 - 45 U/L    ALT(SGPT) 12 2 - 50 U/L    Alkaline Phosphatase 94 30 - 99 U/L    Total Bilirubin 0.7 0.1 - 1.5 mg/dL    Albumin 4.2 3.2 - 4.9 g/dL    Total Protein 8.2 6.0 - 8.2 g/dL    Globulin 4.0 (H) 1.9 - 3.5 g/dL    A-G Ratio 1.1 g/dL   Troponin - STAT Once    Collection Time: 07/25/25  9:57 AM   Result Value Ref Range    Troponin T 21 (H) 6 - 19 ng/L   ESTIMATED GFR    Collection Time: 07/25/25  9:57 AM   Result Value Ref Range    GFR (CKD-EPI) 52 (A) >60 mL/min/1.73 m 2         RADIOLOGY/PROCEDURES   I have independently interpreted the diagnostic imaging associated with this visit and am waiting the final reading from the radiologist.   My preliminary interpretation is as follows: Below    Radiologist interpretation:  DX-CHEST-PORTABLE (1 VIEW)   Final Result      Cardiomegaly and diffuse interstitial opacities most consistent with pulmonary edema.          COURSE & MEDICAL DECISION MAKING    ASSESSMENT, COURSE AND PLAN  Care Narrative: This is a 89-year-old female here for evaluation of upper right back and shoulder pain and rash, consistent with new onset shingles.  Patient will be treated with antivirals here, in addition to pain medications.  Blood workup done showing no acute symptoms for concerns.  Patient does have troponin of 21, but historically has elevated levels as well.  Patient has no anterior chest pain.  No shortness breath, no diaphoresis.        DISPOSITION AND DISCUSSIONS  I have discussed management of the patient with the following physicians and WILFREDO's: None    Discussion of management with  other hospitals or appropriate source(s): None    Escalation of care considered, and ultimately not performed: None    Barriers to care at this time, including but not limited to: None.     Decision tools and prescription drugs considered including, but not limited to: None.    FINAL DIAGNOSIS  Herpes zoster     Electronically signed by: Hugo Varner D.O., 7/25/2025 10:52 AM           [1]   Allergies  Allergen Reactions    Penicillins Shortness of Breath and Rash     Occurred several years ago in the [facility]

## 2025-07-25 NOTE — ED TRIAGE NOTES
.  Chief Complaint   Patient presents with    Back Pain     Upper back pain x 2 days with no relief. Radiates to Right shoulder and neck    Unable to Sleep   To triage by w/c with daughter. Pt is [employer] daughter is translating. Rash to right arm noticed today.  EKG ordered.

## 2025-07-25 NOTE — ED NOTES
Written and verbal instructions provided to pt. Pt instructed to follow up with PCP and  medications from pharmacy. Pt instructed to return to emergency department for new or worsening symptoms. Pt verbalized understanding of discharge instructions.

## 2025-08-08 ENCOUNTER — APPOINTMENT (OUTPATIENT)
Dept: RADIOLOGY | Facility: MEDICAL CENTER | Age: OVER 89
End: 2025-08-08
Attending: STUDENT IN AN ORGANIZED HEALTH CARE EDUCATION/TRAINING PROGRAM
Payer: MEDICARE

## 2025-08-08 ENCOUNTER — HOSPITAL ENCOUNTER (EMERGENCY)
Facility: MEDICAL CENTER | Age: OVER 89
End: 2025-08-09
Attending: STUDENT IN AN ORGANIZED HEALTH CARE EDUCATION/TRAINING PROGRAM
Payer: MEDICARE

## 2025-08-08 DIAGNOSIS — B02.9 HERPES ZOSTER WITHOUT COMPLICATION: ICD-10-CM

## 2025-08-08 DIAGNOSIS — R07.9 CHEST PAIN, UNSPECIFIED TYPE: Primary | ICD-10-CM

## 2025-08-08 DIAGNOSIS — J18.9 PNEUMONIA OF BOTH LUNGS DUE TO INFECTIOUS ORGANISM, UNSPECIFIED PART OF LUNG: ICD-10-CM

## 2025-08-08 LAB
ALBUMIN SERPL BCP-MCNC: 3.9 G/DL (ref 3.2–4.9)
ALBUMIN/GLOB SERPL: 1.1 G/DL
ALP SERPL-CCNC: 86 U/L (ref 30–99)
ALT SERPL-CCNC: 11 U/L (ref 2–50)
ANION GAP SERPL CALC-SCNC: 11 MMOL/L (ref 7–16)
AST SERPL-CCNC: 21 U/L (ref 12–45)
BASOPHILS # BLD AUTO: 0.5 % (ref 0–1.8)
BASOPHILS # BLD: 0.05 K/UL (ref 0–0.12)
BILIRUB SERPL-MCNC: 0.4 MG/DL (ref 0.1–1.5)
BUN SERPL-MCNC: 26 MG/DL (ref 8–22)
CALCIUM ALBUM COR SERPL-MCNC: 9.6 MG/DL (ref 8.5–10.5)
CALCIUM SERPL-MCNC: 9.5 MG/DL (ref 8.5–10.5)
CHLORIDE SERPL-SCNC: 99 MMOL/L (ref 96–112)
CO2 SERPL-SCNC: 19 MMOL/L (ref 20–33)
CREAT SERPL-MCNC: 1.04 MG/DL (ref 0.5–1.4)
EOSINOPHIL # BLD AUTO: 0.18 K/UL (ref 0–0.51)
EOSINOPHIL NFR BLD: 1.8 % (ref 0–6.9)
ERYTHROCYTE [DISTWIDTH] IN BLOOD BY AUTOMATED COUNT: 49.8 FL (ref 35.9–50)
GFR SERPLBLD CREATININE-BSD FMLA CKD-EPI: 50 ML/MIN/1.73 M 2
GLOBULIN SER CALC-MCNC: 3.4 G/DL (ref 1.9–3.5)
GLUCOSE SERPL-MCNC: 97 MG/DL (ref 65–99)
HCT VFR BLD AUTO: 33.1 % (ref 37–47)
HGB BLD-MCNC: 11.3 G/DL (ref 12–16)
IMM GRANULOCYTES # BLD AUTO: 0.03 K/UL (ref 0–0.11)
IMM GRANULOCYTES NFR BLD AUTO: 0.3 % (ref 0–0.9)
LYMPHOCYTES # BLD AUTO: 3.02 K/UL (ref 1–4.8)
LYMPHOCYTES NFR BLD: 30.8 % (ref 22–41)
MCH RBC QN AUTO: 33.3 PG (ref 27–33)
MCHC RBC AUTO-ENTMCNC: 34.1 G/DL (ref 32.2–35.5)
MCV RBC AUTO: 97.6 FL (ref 81.4–97.8)
MONOCYTES # BLD AUTO: 1 K/UL (ref 0–0.85)
MONOCYTES NFR BLD AUTO: 10.2 % (ref 0–13.4)
NEUTROPHILS # BLD AUTO: 5.53 K/UL (ref 1.82–7.42)
NEUTROPHILS NFR BLD: 56.4 % (ref 44–72)
NRBC # BLD AUTO: 0 K/UL
NRBC BLD-RTO: 0 /100 WBC (ref 0–0.2)
NT-PROBNP SERPL IA-MCNC: 2856 PG/ML (ref 0–125)
PARASITE SPEC INSPECT: NORMAL
PLATELET # BLD AUTO: 200 K/UL (ref 164–446)
PMV BLD AUTO: 9.9 FL (ref 9–12.9)
POTASSIUM SERPL-SCNC: 4.8 MMOL/L (ref 3.6–5.5)
PROT SERPL-MCNC: 7.3 G/DL (ref 6–8.2)
RBC # BLD AUTO: 3.39 M/UL (ref 4.2–5.4)
SIGNIFICANT IND 70042: NORMAL
SITE SITE: NORMAL
SODIUM SERPL-SCNC: 129 MMOL/L (ref 135–145)
SOURCE SOURCE: NORMAL
TROPONIN T SERPL-MCNC: 22 NG/L (ref 6–19)
WBC # BLD AUTO: 9.8 K/UL (ref 4.8–10.8)

## 2025-08-08 PROCEDURE — 93005 ELECTROCARDIOGRAM TRACING: CPT | Mod: TC | Performed by: STUDENT IN AN ORGANIZED HEALTH CARE EDUCATION/TRAINING PROGRAM

## 2025-08-08 PROCEDURE — 85025 COMPLETE CBC W/AUTO DIFF WBC: CPT

## 2025-08-08 PROCEDURE — 93005 ELECTROCARDIOGRAM TRACING: CPT | Mod: TC

## 2025-08-08 PROCEDURE — 83880 ASSAY OF NATRIURETIC PEPTIDE: CPT

## 2025-08-08 PROCEDURE — 87169 MACROSCOPIC EXAM PARASITE: CPT

## 2025-08-08 PROCEDURE — 700102 HCHG RX REV CODE 250 W/ 637 OVERRIDE(OP): Performed by: STUDENT IN AN ORGANIZED HEALTH CARE EDUCATION/TRAINING PROGRAM

## 2025-08-08 PROCEDURE — A9270 NON-COVERED ITEM OR SERVICE: HCPCS | Performed by: STUDENT IN AN ORGANIZED HEALTH CARE EDUCATION/TRAINING PROGRAM

## 2025-08-08 PROCEDURE — 71045 X-RAY EXAM CHEST 1 VIEW: CPT

## 2025-08-08 PROCEDURE — 80053 COMPREHEN METABOLIC PANEL: CPT

## 2025-08-08 PROCEDURE — 84484 ASSAY OF TROPONIN QUANT: CPT | Mod: 91

## 2025-08-08 PROCEDURE — 99285 EMERGENCY DEPT VISIT HI MDM: CPT

## 2025-08-08 RX ORDER — HYDROCODONE BITARTRATE AND ACETAMINOPHEN 5; 325 MG/1; MG/1
1 TABLET ORAL ONCE
Refills: 0 | Status: COMPLETED | OUTPATIENT
Start: 2025-08-08 | End: 2025-08-08

## 2025-08-08 RX ADMIN — HYDROCODONE BITARTRATE AND ACETAMINOPHEN 1 TABLET: 5; 325 TABLET ORAL at 23:29

## 2025-08-08 ASSESSMENT — PAIN DESCRIPTION - DESCRIPTORS: DESCRIPTORS: ACHING

## 2025-08-08 ASSESSMENT — FIBROSIS 4 INDEX: FIB4 SCORE: 3.23

## 2025-08-08 ASSESSMENT — PAIN DESCRIPTION - PAIN TYPE
TYPE: ACUTE PAIN
TYPE: ACUTE PAIN

## 2025-08-09 ENCOUNTER — APPOINTMENT (OUTPATIENT)
Dept: RADIOLOGY | Facility: MEDICAL CENTER | Age: OVER 89
End: 2025-08-09
Attending: STUDENT IN AN ORGANIZED HEALTH CARE EDUCATION/TRAINING PROGRAM
Payer: MEDICARE

## 2025-08-09 VITALS
BODY MASS INDEX: 27.38 KG/M2 | OXYGEN SATURATION: 94 % | HEART RATE: 91 BPM | WEIGHT: 145 LBS | HEIGHT: 61 IN | RESPIRATION RATE: 18 BRPM | TEMPERATURE: 97 F | SYSTOLIC BLOOD PRESSURE: 169 MMHG | DIASTOLIC BLOOD PRESSURE: 103 MMHG

## 2025-08-09 LAB
EKG IMPRESSION: NORMAL
TROPONIN T SERPL-MCNC: 21 NG/L (ref 6–19)

## 2025-08-09 PROCEDURE — 71275 CT ANGIOGRAPHY CHEST: CPT

## 2025-08-09 PROCEDURE — 700117 HCHG RX CONTRAST REV CODE 255: Performed by: STUDENT IN AN ORGANIZED HEALTH CARE EDUCATION/TRAINING PROGRAM

## 2025-08-09 RX ORDER — HYDROCODONE BITARTRATE AND ACETAMINOPHEN 5; 325 MG/1; MG/1
1 TABLET ORAL EVERY 6 HOURS PRN
Qty: 16 TABLET | Refills: 0 | Status: SHIPPED | OUTPATIENT
Start: 2025-08-09 | End: 2025-08-13

## 2025-08-09 RX ORDER — SODIUM CHLORIDE, SODIUM LACTATE, POTASSIUM CHLORIDE, AND CALCIUM CHLORIDE .6; .31; .03; .02 G/100ML; G/100ML; G/100ML; G/100ML
500 INJECTION, SOLUTION INTRAVENOUS ONCE
Status: DISCONTINUED | OUTPATIENT
Start: 2025-08-09 | End: 2025-08-09

## 2025-08-09 RX ORDER — SODIUM CHLORIDE, SODIUM LACTATE, POTASSIUM CHLORIDE, CALCIUM CHLORIDE 600; 310; 30; 20 MG/100ML; MG/100ML; MG/100ML; MG/100ML
1000 INJECTION, SOLUTION INTRAVENOUS ONCE
Status: DISCONTINUED | OUTPATIENT
Start: 2025-08-09 | End: 2025-08-09

## 2025-08-09 RX ORDER — CEFUROXIME AXETIL 500 MG/1
500 TABLET ORAL EVERY 24 HOURS
Qty: 5 TABLET | Refills: 0 | Status: ACTIVE | OUTPATIENT
Start: 2025-08-09 | End: 2025-08-14

## 2025-08-09 RX ORDER — AZITHROMYCIN 250 MG/1
TABLET, FILM COATED ORAL
Qty: 6 TABLET | Refills: 0 | Status: ACTIVE | OUTPATIENT
Start: 2025-08-09

## 2025-08-09 RX ADMIN — IOHEXOL 50 ML: 350 INJECTION, SOLUTION INTRAVENOUS at 03:15

## 2025-09-03 ENCOUNTER — APPOINTMENT (OUTPATIENT)
Dept: MEDICAL GROUP | Facility: LAB | Age: OVER 89
End: 2025-09-03
Payer: MEDICARE

## (undated) DEVICE — ELECTRODE DUAL RETURN W/ CORD - (50/PK)

## (undated) DEVICE — CHLORAPREP 26 ML APPLICATOR - ORANGE TINT(25/CA)

## (undated) DEVICE — GLOVE BIOGEL INDICATOR SZ 6.5 SURGICAL PF LTX - (50PR/BX 4BX/CA)

## (undated) DEVICE — CANISTER SUCTION RIGID RED 1500CC (40EA/CA)

## (undated) DEVICE — SET IRRIGATION CYSTOSCOPY TUBE L80 IN (20EA/CA)

## (undated) DEVICE — KIT ANESTHESIA W/CIRCUIT & 3/LT BAG W/FILTER (20EA/CA)

## (undated) DEVICE — GLOVE SURGICAL LATEX POWDER FREE STIRLE SZ 8 ENCORE MICROPTIC (200PR/CA)

## (undated) DEVICE — DRAPE VAGINAL BIB W/ POUCH (10EA/CA)

## (undated) DEVICE — SET LEADWIRE 5 LEAD BEDSIDE DISPOSABLE ECG (1SET OF 5/EA)

## (undated) DEVICE — TUBING CLEARLINK DUO-VENT - C-FLO (48EA/CA)

## (undated) DEVICE — SUCTION INSTRUMENT YANKAUER BULBOUS TIP W/O VENT (50EA/CA)

## (undated) DEVICE — CANISTER SUCTION 3000ML MECHANICAL FILTER AUTO SHUTOFF MEDI-VAC NONSTERILE LF DISP  (40EA/CA)

## (undated) DEVICE — KIT  I.V. START (100EA/CA)

## (undated) DEVICE — SODIUM CHL IRRIGATION 0.9% 1000ML (12EA/CA)

## (undated) DEVICE — SUTURE 3-0 VICRYL PLUS SH - 8X 18 INCH (12/BX)

## (undated) DEVICE — SUTURE GENERAL

## (undated) DEVICE — PORT AUXILLARY WATER (50EA/BX)

## (undated) DEVICE — PACK MINOR BASIN - (2EA/CA)

## (undated) DEVICE — ELECTRODE 850 FOAM ADHESIVE - HYDROGEL RADIOTRNSPRNT (50/PK)

## (undated) DEVICE — SUTURE 0 VICRYL PLUS CT-1 - 8 X 18 INCH (12/BX)

## (undated) DEVICE — MASK AIRWAY SIZE 3 UNIQUE SILICON (10/BX)

## (undated) DEVICE — LACTATED RINGERS INJ 1000 ML - (14EA/CA 60CA/PF)

## (undated) DEVICE — TUBE CONNECTING SUCTION - CLEAR PLASTIC STERILE 72 IN (50EA/CA)

## (undated) DEVICE — TOWEL STOP TIMEOUT SAFETY FLAG (40EA/CA)

## (undated) DEVICE — GLOVE BIOGEL SZ 6.5 SURGICAL PF LTX (50PR/BX 4BX/CA)

## (undated) DEVICE — SENSOR SPO2 NEO LNCS ADHESIVE (20/BX) SEE USER NOTES

## (undated) DEVICE — SUTURE CAPIO (12EA/BX)

## (undated) DEVICE — BLADE SURGICAL #11 - (50/BX)

## (undated) DEVICE — MASK OXYGEN VNYL ADLT MED CONC WITH 7 FOOT TUBING - (50EA/CA)

## (undated) DEVICE — SET EXTENSION WITH 2 PORTS (48EA/CA) ***PART #2C8610 IS A SUBSTITUTE*****

## (undated) DEVICE — BLADE SURGICAL #15 - (50/BX 3BX/CA)

## (undated) DEVICE — SLEEVE, VASO, THIGH, MED

## (undated) DEVICE — NEPTUNE 4 PORT MANIFOLD - (20/PK)

## (undated) DEVICE — TRAY FOLEY CATHETER STATLOCK 16FR SURESTEP  (10EA/CA)

## (undated) DEVICE — TRAY SRGPRP PVP IOD WT PRP - (20/CA)

## (undated) DEVICE — BOVIE BLADE COATED - (50/PK)

## (undated) DEVICE — CATHETER IV 20 GA X 1-1/4 ---SURG.& SDS ONLY--- (50EA/BX)

## (undated) DEVICE — GOWN SURGEONS X-LARGE - DISP. (30/CA)

## (undated) DEVICE — FILM CASSETTE ENDO

## (undated) DEVICE — KIT CUSTOM PROCEDURE SINGLE FOR ENDO (15/CA)

## (undated) DEVICE — BUTTON ENDOSCOPY DISPOSABLE

## (undated) DEVICE — MASK AIRWAY FLEXIBLE SINGLE-USE SIZE 4 ADULTS (10EA/BX)

## (undated) DEVICE — SUTURE 2-0 VICRYL PLUS CT-1 36 (36PK/BX)"

## (undated) DEVICE — GOWN WARMING STANDARD FLEX - (30/CA)

## (undated) DEVICE — SUTURE 4-0 VICRYL PLUS FS-2 - 27 INCH (36/BX)

## (undated) DEVICE — SHEET TRANSVERSE LAP - (12EA/CA)

## (undated) DEVICE — MASK ANESTHESIA ADULT  - (100/CA)

## (undated) DEVICE — SYRINGE 10 ML CONTROL LL (25EA/BX 4BX/CA)

## (undated) DEVICE — Device

## (undated) DEVICE — PAD SANITARY 11IN MAXI IND WRAPPED  (12EA/PK 24PK/CA)

## (undated) DEVICE — MANIFOLD NEPTUNE 1 PORT (20/PK)

## (undated) DEVICE — DRESSING TRANSPARENT FILM TEGADERM 2.375 X 2.75"  (100EA/BX)"

## (undated) DEVICE — SENSOR OXIMETER ADULT SPO2 RD SET (20EA/BX)

## (undated) DEVICE — GLOVE BIOGEL SZ 8 SURGICAL PF LTX - (50PR/BX 4BX/CA)

## (undated) DEVICE — PROTECTOR ULNA NERVE - (36PR/CA)

## (undated) DEVICE — WATER IRRIGATION STERILE 1000ML (12EA/CA)

## (undated) DEVICE — KIT ROOM DECONTAMINATION

## (undated) DEVICE — NEEDLE NON SAFETY 25 GA X 1 1/2 IN HYPO (100EA/BX)

## (undated) DEVICE — GLOVE BIOGEL PI INDICATOR SZ 6.5 SURGICAL PF LF - (50/BX 4BX/CA)

## (undated) DEVICE — DEVICE SUTURE CAPTURING

## (undated) DEVICE — HEAD HOLDER JUNIOR/ADULT

## (undated) DEVICE — SUTURE 0 VICRYL PLUS CT-1 - 36 INCH (36/BX)

## (undated) DEVICE — GLOVE BIOGEL SZ 7.5 SURGICAL PF LTX - (50PR/BX 4BX/CA)

## (undated) DEVICE — GLOVE SZ 6.5 BIOGEL PI MICRO - PF LF (50PR/BX)

## (undated) DEVICE — GOWN SURGICAL XX-LARGE - (28EA/CA) SIRUS NON REINFORCED